# Patient Record
Sex: FEMALE | Race: WHITE | NOT HISPANIC OR LATINO | Employment: OTHER | URBAN - METROPOLITAN AREA
[De-identification: names, ages, dates, MRNs, and addresses within clinical notes are randomized per-mention and may not be internally consistent; named-entity substitution may affect disease eponyms.]

---

## 2017-03-08 ENCOUNTER — HOSPITAL ENCOUNTER (OUTPATIENT)
Dept: GENERAL RADIOLOGY | Facility: HOSPITAL | Age: 82
Discharge: HOME | End: 2017-03-08
Attending: FAMILY MEDICINE | Admitting: FAMILY MEDICINE

## 2017-07-27 ENCOUNTER — OFFICE (OUTPATIENT)
Dept: URBAN - METROPOLITAN AREA CLINIC 64 | Facility: CLINIC | Age: 82
End: 2017-07-27

## 2017-07-27 VITALS
HEART RATE: 48 BPM | SYSTOLIC BLOOD PRESSURE: 103 MMHG | WEIGHT: 98 LBS | HEIGHT: 63 IN | DIASTOLIC BLOOD PRESSURE: 63 MMHG

## 2017-07-27 DIAGNOSIS — L30.9 DERMATITIS, UNSPECIFIED: ICD-10-CM

## 2017-07-27 DIAGNOSIS — K43.5 PARASTOMAL HERNIA WITHOUT OBSTRUCTION OR GANGRENE: ICD-10-CM

## 2017-07-27 PROCEDURE — 99202 OFFICE O/P NEW SF 15 MIN: CPT | Performed by: NURSE PRACTITIONER

## 2017-10-13 ENCOUNTER — HOSPITAL ENCOUNTER (OUTPATIENT)
Dept: CARDIOLOGY | Facility: HOSPITAL | Age: 82
Discharge: HOME | End: 2017-10-13
Attending: INTERNAL MEDICINE | Admitting: INTERNAL MEDICINE

## 2019-01-01 ENCOUNTER — APPOINTMENT (OUTPATIENT)
Dept: GENERAL RADIOLOGY | Facility: HOSPITAL | Age: 84
End: 2019-01-01

## 2019-01-01 ENCOUNTER — CLINICAL SUPPORT NO REQUIREMENTS (OUTPATIENT)
Dept: CARDIOLOGY | Facility: CLINIC | Age: 84
End: 2019-01-01

## 2019-01-01 ENCOUNTER — HOSPITAL ENCOUNTER (OUTPATIENT)
Dept: CARDIOLOGY | Facility: HOSPITAL | Age: 84
Discharge: HOME | End: 2019-10-15

## 2019-01-01 ENCOUNTER — APPOINTMENT (OUTPATIENT)
Dept: CT IMAGING | Facility: HOSPITAL | Age: 84
End: 2019-01-01

## 2019-01-01 ENCOUNTER — DOCUMENTATION (OUTPATIENT)
Dept: NEPHROLOGY | Facility: HOSPITAL | Age: 84
End: 2019-01-01

## 2019-01-01 ENCOUNTER — APPOINTMENT (OUTPATIENT)
Dept: ULTRASOUND IMAGING | Facility: HOSPITAL | Age: 84
End: 2019-01-01

## 2019-01-01 ENCOUNTER — HOSPITAL ENCOUNTER (INPATIENT)
Facility: HOSPITAL | Age: 84
LOS: 6 days | End: 2019-10-20
Attending: EMERGENCY MEDICINE | Admitting: INTERNAL MEDICINE

## 2019-01-01 ENCOUNTER — OFFICE VISIT (OUTPATIENT)
Dept: CARDIOLOGY | Facility: CLINIC | Age: 84
End: 2019-01-01

## 2019-01-01 VITALS
OXYGEN SATURATION: 67 % | RESPIRATION RATE: 24 BRPM | WEIGHT: 133.16 LBS | BODY MASS INDEX: 23.59 KG/M2 | HEART RATE: 93 BPM | DIASTOLIC BLOOD PRESSURE: 63 MMHG | SYSTOLIC BLOOD PRESSURE: 81 MMHG | HEIGHT: 63 IN | TEMPERATURE: 97.4 F

## 2019-01-01 VITALS
HEIGHT: 61 IN | SYSTOLIC BLOOD PRESSURE: 104 MMHG | HEART RATE: 65 BPM | BODY MASS INDEX: 20.11 KG/M2 | WEIGHT: 106.5 LBS | DIASTOLIC BLOOD PRESSURE: 62 MMHG | OXYGEN SATURATION: 98 %

## 2019-01-01 VITALS
DIASTOLIC BLOOD PRESSURE: 37 MMHG | BODY MASS INDEX: 22.32 KG/M2 | WEIGHT: 126 LBS | SYSTOLIC BLOOD PRESSURE: 104 MMHG | HEIGHT: 63 IN

## 2019-01-01 DIAGNOSIS — Z95.0 PACEMAKER: ICD-10-CM

## 2019-01-01 DIAGNOSIS — R65.21 SEPTIC SHOCK (HCC): Primary | ICD-10-CM

## 2019-01-01 DIAGNOSIS — I21.4 NON-ST ELEVATION MYOCARDIAL INFARCTION (NSTEMI) (HCC): ICD-10-CM

## 2019-01-01 DIAGNOSIS — R00.1 BRADYCARDIA, UNSPECIFIED: Primary | ICD-10-CM

## 2019-01-01 DIAGNOSIS — I10 ESSENTIAL HYPERTENSION: ICD-10-CM

## 2019-01-01 DIAGNOSIS — A41.9 SEPTIC SHOCK (HCC): Primary | ICD-10-CM

## 2019-01-01 DIAGNOSIS — N17.0 ACUTE KIDNEY INJURY (AKI) WITH ACUTE TUBULAR NECROSIS (ATN) (HCC): ICD-10-CM

## 2019-01-01 DIAGNOSIS — I48.0 PAROXYSMAL ATRIAL FIBRILLATION (HCC): Primary | ICD-10-CM

## 2019-01-01 DIAGNOSIS — I48.0 PAROXYSMAL ATRIAL FIBRILLATION (HCC): ICD-10-CM

## 2019-01-01 DIAGNOSIS — I50.9 ACUTE CONGESTIVE HEART FAILURE, UNSPECIFIED HEART FAILURE TYPE (HCC): ICD-10-CM

## 2019-01-01 DIAGNOSIS — I82.502 CHRONIC DEEP VEIN THROMBOSIS (DVT) OF LEFT LOWER EXTREMITY, UNSPECIFIED VEIN (HCC): ICD-10-CM

## 2019-01-01 DIAGNOSIS — R00.1 BRADYCARDIA, SINUS: Primary | ICD-10-CM

## 2019-01-01 LAB
ABO GROUP BLD: NORMAL
ALBUMIN SERPL-MCNC: 3 G/DL (ref 3.5–5.2)
ALBUMIN SERPL-MCNC: 3.5 G/DL (ref 3.5–4.8)
ALBUMIN SERPL-MCNC: 3.5 G/DL (ref 3.5–4.8)
ALBUMIN/GLOB SERPL: 1.2 G/DL (ref 1–1.7)
ALBUMIN/GLOB SERPL: 1.3 G/DL (ref 1–1.7)
ALP SERPL-CCNC: 94 U/L (ref 39–117)
ALP SERPL-CCNC: 97 U/L (ref 32–91)
ALP SERPL-CCNC: 97 U/L (ref 32–91)
ALT SERPL W P-5'-P-CCNC: 27 U/L (ref 1–33)
ALT SERPL W P-5'-P-CCNC: <5 U/L (ref 14–54)
ALT SERPL W P-5'-P-CCNC: <5 U/L (ref 14–54)
ANION GAP SERPL CALCULATED.3IONS-SCNC: 12 MMOL/L (ref 5–15)
ANION GAP SERPL CALCULATED.3IONS-SCNC: 12 MMOL/L (ref 5–15)
ANION GAP SERPL CALCULATED.3IONS-SCNC: 13.6 MMOL/L (ref 5–15)
ANION GAP SERPL CALCULATED.3IONS-SCNC: 14.8 MMOL/L (ref 5–15)
ANION GAP SERPL CALCULATED.3IONS-SCNC: 17 MMOL/L (ref 5–15)
ANION GAP SERPL CALCULATED.3IONS-SCNC: 21 MMOL/L (ref 5–15)
ANION GAP SERPL CALCULATED.3IONS-SCNC: 23.2 MMOL/L (ref 5–15)
ANION GAP SERPL CALCULATED.3IONS-SCNC: 24.6 MMOL/L (ref 5–15)
ANION GAP SERPL CALCULATED.3IONS-SCNC: 28.3 MMOL/L (ref 5–15)
ANION GAP SERPL CALCULATED.3IONS-SCNC: 32.2 MMOL/L (ref 5–15)
ANION GAP SERPL CALCULATED.3IONS-SCNC: 33.2 MMOL/L (ref 5–15)
APTT PPP: 24.7 SECONDS (ref 24–31)
APTT PPP: 26.2 SECONDS (ref 24–31)
ARTERIAL PATENCY WRIST A: ABNORMAL
ARTERIAL PATENCY WRIST A: POSITIVE
AST SERPL-CCNC: 51 U/L (ref 15–41)
AST SERPL-CCNC: 51 U/L (ref 15–41)
AST SERPL-CCNC: 78 U/L (ref 1–32)
ATMOSPHERIC PRESS: ABNORMAL MM[HG]
B PERT DNA SPEC QL NAA+PROBE: NOT DETECTED
BACTERIA SPEC AEROBE CULT: ABNORMAL
BACTERIA SPEC AEROBE CULT: ABNORMAL
BACTERIA SPEC AEROBE CULT: NORMAL
BACTERIA UR QL AUTO: ABNORMAL /HPF
BASE EXCESS BLDA CALC-SCNC: -15.1 MMOL/L (ref 0–3)
BASE EXCESS BLDA CALC-SCNC: -19.3 MMOL/L (ref 0–3)
BASE EXCESS BLDA CALC-SCNC: -3.8 MMOL/L (ref 0–3)
BASE EXCESS BLDA CALC-SCNC: -5.1 MMOL/L (ref 0–3)
BASE EXCESS BLDA CALC-SCNC: -8.4 MMOL/L (ref 0–3)
BASE EXCESS BLDA CALC-SCNC: 6.7 MMOL/L (ref 0–3)
BASE EXCESS BLDA CALC-SCNC: 7.1 MMOL/L (ref 0–3)
BASOPHILS # BLD AUTO: 0 10*3/MM3 (ref 0–0.2)
BASOPHILS # BLD AUTO: 0.1 10*3/MM3 (ref 0–0.2)
BASOPHILS NFR BLD AUTO: 0.1 % (ref 0–1.5)
BASOPHILS NFR BLD AUTO: 0.2 % (ref 0–1.5)
BASOPHILS NFR BLD AUTO: 0.3 % (ref 0–1.5)
BASOPHILS NFR BLD AUTO: 0.3 % (ref 0–1.5)
BASOPHILS NFR BLD AUTO: 0.5 % (ref 0–1.5)
BDY SITE: ABNORMAL
BH CV ECHO MEAS - ACS: 1.6 CM
BH CV ECHO MEAS - AO ROOT AREA (BSA CORRECTED): 1.6
BH CV ECHO MEAS - AO ROOT AREA: 5.2 CM^2
BH CV ECHO MEAS - AO ROOT DIAM: 2.6 CM
BH CV ECHO MEAS - BSA(HAYCOCK): 1.6 M^2
BH CV ECHO MEAS - BSA: 1.6 M^2
BH CV ECHO MEAS - BZI_BMI: 22.5 KILOGRAMS/M^2
BH CV ECHO MEAS - BZI_METRIC_HEIGHT: 160 CM
BH CV ECHO MEAS - BZI_METRIC_WEIGHT: 57.6 KG
BH CV ECHO MEAS - EDV(CUBED): 46.2 ML
BH CV ECHO MEAS - EDV(MOD-SP4): 31.5 ML
BH CV ECHO MEAS - EDV(TEICH): 54 ML
BH CV ECHO MEAS - EF(CUBED): 69.3 %
BH CV ECHO MEAS - EF(MOD-BP): 54 %
BH CV ECHO MEAS - EF(MOD-SP4): 54 %
BH CV ECHO MEAS - EF(TEICH): 61.9 %
BH CV ECHO MEAS - ESV(CUBED): 14.2 ML
BH CV ECHO MEAS - ESV(MOD-SP4): 14.5 ML
BH CV ECHO MEAS - ESV(TEICH): 20.6 ML
BH CV ECHO MEAS - FS: 32.6 %
BH CV ECHO MEAS - IVS/LVPW: 0.94
BH CV ECHO MEAS - IVSD: 0.85 CM
BH CV ECHO MEAS - LA DIMENSION(2D): 4 CM
BH CV ECHO MEAS - LV DIASTOLIC VOL/BSA (35-75): 19.8 ML/M^2
BH CV ECHO MEAS - LV MASS(C)D: 88.9 GRAMS
BH CV ECHO MEAS - LV MASS(C)DI: 55.8 GRAMS/M^2
BH CV ECHO MEAS - LV MAX PG: 1.2 MMHG
BH CV ECHO MEAS - LV MEAN PG: 0.59 MMHG
BH CV ECHO MEAS - LV SYSTOLIC VOL/BSA (12-30): 9.1 ML/M^2
BH CV ECHO MEAS - LV V1 MAX: 54.8 CM/SEC
BH CV ECHO MEAS - LV V1 MEAN: 35.8 CM/SEC
BH CV ECHO MEAS - LV V1 VTI: 10.4 CM
BH CV ECHO MEAS - LVIDD: 3.6 CM
BH CV ECHO MEAS - LVIDS: 2.4 CM
BH CV ECHO MEAS - LVOT AREA: 2.4 CM^2
BH CV ECHO MEAS - LVOT DIAM: 1.8 CM
BH CV ECHO MEAS - LVPWD: 0.9 CM
BH CV ECHO MEAS - MR MAX PG: 26.1 MMHG
BH CV ECHO MEAS - MR MAX VEL: 255.2 CM/SEC
BH CV ECHO MEAS - MV DEC TIME: 0.2 SEC
BH CV ECHO MEAS - MV E MAX VEL: 81.7 CM/SEC
BH CV ECHO MEAS - MV MAX PG: 2.8 MMHG
BH CV ECHO MEAS - MV MEAN PG: 1.1 MMHG
BH CV ECHO MEAS - MV V2 MAX: 83.7 CM/SEC
BH CV ECHO MEAS - MV V2 MEAN: 44.4 CM/SEC
BH CV ECHO MEAS - MV V2 VTI: 13.8 CM
BH CV ECHO MEAS - MVA(VTI): 1.8 CM^2
BH CV ECHO MEAS - PA MAX PG (FULL): 0.43 MMHG
BH CV ECHO MEAS - PA MAX PG: 1.5 MMHG
BH CV ECHO MEAS - PA V2 MAX: 61 CM/SEC
BH CV ECHO MEAS - PVA(V,A): 3.7 CM^2
BH CV ECHO MEAS - PVA(V,D): 3.7 CM^2
BH CV ECHO MEAS - QP/QS: 1.3
BH CV ECHO MEAS - RAP SYSTOLE: 3 MMHG
BH CV ECHO MEAS - RV MAX PG: 1.1 MMHG
BH CV ECHO MEAS - RV MEAN PG: 0.44 MMHG
BH CV ECHO MEAS - RV V1 MAX: 51.4 CM/SEC
BH CV ECHO MEAS - RV V1 MEAN: 30.7 CM/SEC
BH CV ECHO MEAS - RV V1 VTI: 7.8 CM
BH CV ECHO MEAS - RVDD: 2.5 CM
BH CV ECHO MEAS - RVOT AREA: 4.4 CM^2
BH CV ECHO MEAS - RVOT DIAM: 2.4 CM
BH CV ECHO MEAS - RVSP: 37 MMHG
BH CV ECHO MEAS - SI(CUBED): 20.1 ML/M^2
BH CV ECHO MEAS - SI(LVOT): 15.9 ML/M^2
BH CV ECHO MEAS - SI(MOD-SP4): 10.7 ML/M^2
BH CV ECHO MEAS - SI(TEICH): 21 ML/M^2
BH CV ECHO MEAS - SV(CUBED): 32 ML
BH CV ECHO MEAS - SV(LVOT): 25.4 ML
BH CV ECHO MEAS - SV(MOD-SP4): 17 ML
BH CV ECHO MEAS - SV(RVOT): 34.2 ML
BH CV ECHO MEAS - SV(TEICH): 33.4 ML
BH CV ECHO MEAS - TR MAX VEL: 291.7 CM/SEC
BILIRUB CONJ SERPL-MCNC: 0.9 MG/DL (ref 0.2–0.3)
BILIRUB INDIRECT SERPL-MCNC: 0.5 MG/DL
BILIRUB SERPL-MCNC: 1.4 MG/DL (ref 0.2–1.2)
BILIRUB SERPL-MCNC: 2.1 MG/DL (ref 0.3–1.2)
BILIRUB SERPL-MCNC: 2.3 MG/DL (ref 0.3–1.2)
BILIRUB UR QL STRIP: ABNORMAL
BLD GP AB SCN SERPL QL: NEGATIVE
BNP SERPL-MCNC: 1784 PG/ML
BUN BLD-MCNC: 39 MG/DL (ref 8–23)
BUN BLD-MCNC: 40 MG/DL (ref 8–23)
BUN BLD-MCNC: 44 MG/DL (ref 8–23)
BUN BLD-MCNC: 48 MG/DL (ref 8–23)
BUN BLD-MCNC: 58 MG/DL (ref 8–23)
BUN BLD-MCNC: 63 MG/DL (ref 8–23)
BUN BLD-MCNC: 67 MG/DL (ref 8–20)
BUN BLD-MCNC: 68 MG/DL (ref 8–23)
BUN BLD-MCNC: 69 MG/DL (ref 8–20)
BUN BLD-MCNC: 71 MG/DL (ref 8–20)
BUN BLD-MCNC: 71 MG/DL (ref 8–20)
BUN/CREAT SERPL: 17.8 (ref 5.4–26.2)
BUN/CREAT SERPL: 18.1 (ref 5.4–26.2)
BUN/CREAT SERPL: 18.7 (ref 5.4–26.2)
BUN/CREAT SERPL: 19.7 (ref 5.4–26.2)
BUN/CREAT SERPL: 21.4 (ref 7–25)
BUN/CREAT SERPL: 22.9 (ref 7–25)
BUN/CREAT SERPL: 23.4 (ref 7–25)
BUN/CREAT SERPL: 23.5 (ref 7–25)
BUN/CREAT SERPL: 23.8 (ref 7–25)
BUN/CREAT SERPL: 25.1 (ref 7–25)
BUN/CREAT SERPL: 26.2 (ref 7–25)
BURR CELLS BLD QL SMEAR: ABNORMAL
C PNEUM DNA NPH QL NAA+NON-PROBE: NOT DETECTED
CA-I SERPL ISE-MCNC: 0.96 MMOL/L (ref 1.2–1.3)
CA-I SERPL ISE-MCNC: 1.02 MMOL/L (ref 1.2–1.3)
CALCIUM SPEC-SCNC: 7.2 MG/DL (ref 8.2–9.6)
CALCIUM SPEC-SCNC: 7.3 MG/DL (ref 8.2–9.6)
CALCIUM SPEC-SCNC: 7.3 MG/DL (ref 8.9–10.3)
CALCIUM SPEC-SCNC: 7.5 MG/DL (ref 8.2–9.6)
CALCIUM SPEC-SCNC: 7.5 MG/DL (ref 8.2–9.6)
CALCIUM SPEC-SCNC: 7.5 MG/DL (ref 8.9–10.3)
CALCIUM SPEC-SCNC: 7.7 MG/DL (ref 8.2–9.6)
CALCIUM SPEC-SCNC: 7.8 MG/DL (ref 8.2–9.6)
CALCIUM SPEC-SCNC: 7.9 MG/DL (ref 8.2–9.6)
CALCIUM SPEC-SCNC: 8 MG/DL (ref 8.9–10.3)
CALCIUM SPEC-SCNC: 8.2 MG/DL (ref 8.9–10.3)
CHLORIDE SERPL-SCNC: 100 MMOL/L (ref 98–107)
CHLORIDE SERPL-SCNC: 102 MMOL/L (ref 101–111)
CHLORIDE SERPL-SCNC: 103 MMOL/L (ref 101–111)
CHLORIDE SERPL-SCNC: 104 MMOL/L (ref 98–107)
CHLORIDE SERPL-SCNC: 105 MMOL/L (ref 98–107)
CHLORIDE SERPL-SCNC: 107 MMOL/L (ref 101–111)
CHLORIDE SERPL-SCNC: 109 MMOL/L (ref 101–111)
CHLORIDE SERPL-SCNC: 99 MMOL/L (ref 98–107)
CLARITY UR: ABNORMAL
CO2 BLDA-SCNC: 13 MMOL/L (ref 22–29)
CO2 BLDA-SCNC: 18.8 MMOL/L (ref 22–29)
CO2 BLDA-SCNC: 20.1 MMOL/L (ref 22–29)
CO2 BLDA-SCNC: 27.6 MMOL/L (ref 22–29)
CO2 BLDA-SCNC: 33.8 MMOL/L (ref 22–29)
CO2 BLDA-SCNC: 35.9 MMOL/L (ref 22–29)
CO2 BLDA-SCNC: 8.4 MMOL/L (ref 22–29)
CO2 SERPL-SCNC: 12 MMOL/L (ref 22–32)
CO2 SERPL-SCNC: 14 MMOL/L (ref 22–32)
CO2 SERPL-SCNC: 15 MMOL/L (ref 22–32)
CO2 SERPL-SCNC: 20 MMOL/L (ref 22–32)
CO2 SERPL-SCNC: 21 MMOL/L (ref 22–29)
CO2 SERPL-SCNC: 23 MMOL/L (ref 22–29)
CO2 SERPL-SCNC: 23 MMOL/L (ref 22–29)
CO2 SERPL-SCNC: 28 MMOL/L (ref 22–29)
CO2 SERPL-SCNC: 31 MMOL/L (ref 22–29)
CO2 SERPL-SCNC: 32 MMOL/L (ref 22–29)
CO2 SERPL-SCNC: 33 MMOL/L (ref 22–29)
COLOR UR: ABNORMAL
CREAT BLD-MCNC: 1.68 MG/DL (ref 0.57–1)
CREAT BLD-MCNC: 1.7 MG/DL (ref 0.57–1)
CREAT BLD-MCNC: 1.7 MG/DL (ref 0.57–1)
CREAT BLD-MCNC: 2.05 MG/DL (ref 0.57–1)
CREAT BLD-MCNC: 2.31 MG/DL (ref 0.57–1)
CREAT BLD-MCNC: 2.65 MG/DL (ref 0.57–1)
CREAT BLD-MCNC: 3.18 MG/DL (ref 0.57–1)
CREAT BLD-MCNC: 3.5 MG/DL (ref 0.4–1)
CREAT BLD-MCNC: 3.7 MG/DL (ref 0.4–1)
CREAT BLD-MCNC: 3.8 MG/DL (ref 0.4–1)
CREAT BLD-MCNC: 4 MG/DL (ref 0.4–1)
CRP SERPL-MCNC: 11.81 MG/DL (ref 0–0.7)
CRP SERPL-MCNC: 4.27 MG/DL (ref 0–0.5)
D-LACTATE SERPL-SCNC: 1.5 MMOL/L (ref 0.5–2)
D-LACTATE SERPL-SCNC: 1.6 MMOL/L (ref 0.5–2)
D-LACTATE SERPL-SCNC: 1.7 MMOL/L (ref 0.5–2)
D-LACTATE SERPL-SCNC: 10 MMOL/L (ref 0.5–2)
D-LACTATE SERPL-SCNC: 10.8 MMOL/L (ref 0.5–2)
D-LACTATE SERPL-SCNC: 13.2 MMOL/L (ref 0.5–2.2)
D-LACTATE SERPL-SCNC: 2.1 MMOL/L (ref 0.5–2)
D-LACTATE SERPL-SCNC: 3.5 MMOL/L (ref 0.5–2)
D-LACTATE SERPL-SCNC: 4.2 MMOL/L (ref 0.5–2)
D-LACTATE SERPL-SCNC: 5.2 MMOL/L (ref 0.5–2.2)
D-LACTATE SERPL-SCNC: 5.9 MMOL/L (ref 0.5–2)
D-LACTATE SERPL-SCNC: 7 MMOL/L (ref 0.5–2)
D-LACTATE SERPL-SCNC: 8.3 MMOL/L (ref 0.5–2)
D-LACTATE SERPL-SCNC: 8.6 MMOL/L (ref 0.5–2.2)
D-LACTATE SERPL-SCNC: 9.1 MMOL/L (ref 0.5–2)
D-LACTATE SERPL-SCNC: 9.2 MMOL/L (ref 0.5–2)
D-LACTATE SERPL-SCNC: 9.6 MMOL/L (ref 0.5–2)
D-LACTATE SERPL-SCNC: 9.7 MMOL/L (ref 0.5–2)
DEPRECATED RDW RBC AUTO: 58.2 FL (ref 37–54)
DEPRECATED RDW RBC AUTO: 60.4 FL (ref 37–54)
DEPRECATED RDW RBC AUTO: 60.8 FL (ref 37–54)
DEPRECATED RDW RBC AUTO: 62.1 FL (ref 37–54)
DEPRECATED RDW RBC AUTO: 63.9 FL (ref 37–54)
DEPRECATED RDW RBC AUTO: 64.3 FL (ref 37–54)
DEPRECATED RDW RBC AUTO: 67.4 FL (ref 37–54)
DEPRECATED RDW RBC AUTO: 71.8 FL (ref 37–54)
EOSINOPHIL # BLD AUTO: 0 10*3/MM3 (ref 0–0.4)
EOSINOPHIL # BLD AUTO: 0.1 10*3/MM3 (ref 0–0.4)
EOSINOPHIL NFR BLD AUTO: 0 % (ref 0.3–6.2)
EOSINOPHIL NFR BLD AUTO: 0 % (ref 0.3–6.2)
EOSINOPHIL NFR BLD AUTO: 0.1 % (ref 0.3–6.2)
EOSINOPHIL NFR BLD AUTO: 0.1 % (ref 0.3–6.2)
EOSINOPHIL NFR BLD AUTO: 0.5 % (ref 0.3–6.2)
ERYTHROCYTE [DISTWIDTH] IN BLOOD BY AUTOMATED COUNT: 17.1 % (ref 12.3–15.4)
ERYTHROCYTE [DISTWIDTH] IN BLOOD BY AUTOMATED COUNT: 17.5 % (ref 12.3–15.4)
ERYTHROCYTE [DISTWIDTH] IN BLOOD BY AUTOMATED COUNT: 18 % (ref 12.3–15.4)
ERYTHROCYTE [DISTWIDTH] IN BLOOD BY AUTOMATED COUNT: 18.1 % (ref 12.3–15.4)
ERYTHROCYTE [DISTWIDTH] IN BLOOD BY AUTOMATED COUNT: 18.2 % (ref 12.3–15.4)
ERYTHROCYTE [DISTWIDTH] IN BLOOD BY AUTOMATED COUNT: 18.5 % (ref 12.3–15.4)
ERYTHROCYTE [DISTWIDTH] IN BLOOD BY AUTOMATED COUNT: 19.2 % (ref 12.3–15.4)
ERYTHROCYTE [DISTWIDTH] IN BLOOD BY AUTOMATED COUNT: 19.3 % (ref 12.3–15.4)
FLUAV H1 2009 PAND RNA NPH QL NAA+PROBE: NOT DETECTED
FLUAV H1 HA GENE NPH QL NAA+PROBE: NOT DETECTED
FLUAV H3 RNA NPH QL NAA+PROBE: NOT DETECTED
FLUAV SUBTYP SPEC NAA+PROBE: NOT DETECTED
FLUBV RNA ISLT QL NAA+PROBE: NOT DETECTED
GFR SERPL CREATININE-BSD FRML MDRD: 10 ML/MIN/1.73
GFR SERPL CREATININE-BSD FRML MDRD: 11 ML/MIN/1.73
GFR SERPL CREATININE-BSD FRML MDRD: 11 ML/MIN/1.73
GFR SERPL CREATININE-BSD FRML MDRD: 12 ML/MIN/1.73
GFR SERPL CREATININE-BSD FRML MDRD: 14 ML/MIN/1.73
GFR SERPL CREATININE-BSD FRML MDRD: 17 ML/MIN/1.73
GFR SERPL CREATININE-BSD FRML MDRD: 20 ML/MIN/1.73
GFR SERPL CREATININE-BSD FRML MDRD: 23 ML/MIN/1.73
GFR SERPL CREATININE-BSD FRML MDRD: 28 ML/MIN/1.73
GFR SERPL CREATININE-BSD FRML MDRD: ABNORMAL ML/MIN/{1.73_M2}
GLOBULIN UR ELPH-MCNC: 2.7 GM/DL (ref 2.5–3.8)
GLOBULIN UR ELPH-MCNC: 2.9 GM/DL (ref 2.5–3.8)
GLUCOSE BLD-MCNC: 101 MG/DL (ref 65–99)
GLUCOSE BLD-MCNC: 124 MG/DL (ref 65–99)
GLUCOSE BLD-MCNC: 142 MG/DL (ref 65–99)
GLUCOSE BLD-MCNC: 146 MG/DL (ref 65–99)
GLUCOSE BLD-MCNC: 152 MG/DL (ref 65–99)
GLUCOSE BLD-MCNC: 161 MG/DL (ref 65–99)
GLUCOSE BLD-MCNC: 162 MG/DL (ref 65–99)
GLUCOSE BLD-MCNC: 169 MG/DL (ref 65–99)
GLUCOSE BLD-MCNC: 170 MG/DL (ref 65–99)
GLUCOSE BLD-MCNC: 213 MG/DL (ref 65–99)
GLUCOSE BLD-MCNC: 90 MG/DL (ref 65–99)
GLUCOSE BLDC GLUCOMTR-MCNC: 102 MG/DL (ref 70–105)
GLUCOSE BLDC GLUCOMTR-MCNC: 116 MG/DL (ref 70–105)
GLUCOSE BLDC GLUCOMTR-MCNC: 118 MG/DL (ref 70–105)
GLUCOSE BLDC GLUCOMTR-MCNC: 120 MG/DL (ref 70–105)
GLUCOSE BLDC GLUCOMTR-MCNC: 122 MG/DL (ref 70–105)
GLUCOSE BLDC GLUCOMTR-MCNC: 125 MG/DL (ref 70–105)
GLUCOSE BLDC GLUCOMTR-MCNC: 125 MG/DL (ref 70–105)
GLUCOSE BLDC GLUCOMTR-MCNC: 128 MG/DL (ref 70–105)
GLUCOSE BLDC GLUCOMTR-MCNC: 130 MG/DL (ref 70–105)
GLUCOSE BLDC GLUCOMTR-MCNC: 135 MG/DL (ref 70–105)
GLUCOSE BLDC GLUCOMTR-MCNC: 137 MG/DL (ref 70–105)
GLUCOSE BLDC GLUCOMTR-MCNC: 149 MG/DL (ref 70–105)
GLUCOSE BLDC GLUCOMTR-MCNC: 150 MG/DL (ref 70–105)
GLUCOSE BLDC GLUCOMTR-MCNC: 161 MG/DL (ref 70–105)
GLUCOSE BLDC GLUCOMTR-MCNC: 163 MG/DL (ref 70–105)
GLUCOSE BLDC GLUCOMTR-MCNC: 165 MG/DL (ref 70–105)
GLUCOSE BLDC GLUCOMTR-MCNC: 174 MG/DL (ref 70–105)
GLUCOSE BLDC GLUCOMTR-MCNC: 178 MG/DL (ref 70–105)
GLUCOSE BLDC GLUCOMTR-MCNC: 180 MG/DL (ref 70–105)
GLUCOSE BLDC GLUCOMTR-MCNC: 239 MG/DL (ref 70–105)
GLUCOSE BLDC GLUCOMTR-MCNC: 30 MG/DL (ref 70–105)
GLUCOSE BLDC GLUCOMTR-MCNC: 56 MG/DL (ref 70–105)
GLUCOSE UR STRIP-MCNC: NEGATIVE MG/DL
GRAM STN SPEC: ABNORMAL
GRAN CASTS URNS QL MICRO: ABNORMAL /LPF
HADV DNA SPEC NAA+PROBE: NOT DETECTED
HCO3 BLDA-SCNC: 12 MMOL/L (ref 21–28)
HCO3 BLDA-SCNC: 17.6 MMOL/L (ref 21–28)
HCO3 BLDA-SCNC: 19.1 MMOL/L (ref 21–28)
HCO3 BLDA-SCNC: 25.6 MMOL/L (ref 21–28)
HCO3 BLDA-SCNC: 32.3 MMOL/L (ref 21–28)
HCO3 BLDA-SCNC: 34 MMOL/L (ref 21–28)
HCO3 BLDA-SCNC: 7.7 MMOL/L (ref 21–28)
HCOV 229E RNA SPEC QL NAA+PROBE: NOT DETECTED
HCOV HKU1 RNA SPEC QL NAA+PROBE: NOT DETECTED
HCOV NL63 RNA SPEC QL NAA+PROBE: NOT DETECTED
HCOV OC43 RNA SPEC QL NAA+PROBE: NOT DETECTED
HCT VFR BLD AUTO: 34.2 % (ref 34–46.6)
HCT VFR BLD AUTO: 34.7 % (ref 34–46.6)
HCT VFR BLD AUTO: 35.1 % (ref 34–46.6)
HCT VFR BLD AUTO: 36.4 % (ref 34–46.6)
HCT VFR BLD AUTO: 36.4 % (ref 34–46.6)
HCT VFR BLD AUTO: 37.7 % (ref 34–46.6)
HCT VFR BLD AUTO: 38.2 % (ref 34–46.6)
HCT VFR BLD AUTO: 44.9 % (ref 34–46.6)
HEMODILUTION: NO
HEMODILUTION: YES
HEMODILUTION: YES
HGB BLD-MCNC: 11 G/DL (ref 12–15.9)
HGB BLD-MCNC: 11.2 G/DL (ref 12–15.9)
HGB BLD-MCNC: 11.4 G/DL (ref 12–15.9)
HGB BLD-MCNC: 11.7 G/DL (ref 12–15.9)
HGB BLD-MCNC: 12.1 G/DL (ref 12–15.9)
HGB BLD-MCNC: 13.1 G/DL (ref 12–15.9)
HGB UR QL STRIP.AUTO: ABNORMAL
HMPV RNA NPH QL NAA+NON-PROBE: NOT DETECTED
HOLD SPECIMEN: NORMAL
HOROWITZ INDEX BLD+IHG-RTO: 100 %
HOROWITZ INDEX BLD+IHG-RTO: 100 %
HOROWITZ INDEX BLD+IHG-RTO: 44 %
HOROWITZ INDEX BLD+IHG-RTO: 50 %
HOROWITZ INDEX BLD+IHG-RTO: 60 %
HOROWITZ INDEX BLD+IHG-RTO: 60 %
HOROWITZ INDEX BLD+IHG-RTO: <21 %
HPIV1 RNA SPEC QL NAA+PROBE: NOT DETECTED
HPIV2 RNA SPEC QL NAA+PROBE: NOT DETECTED
HPIV3 RNA NPH QL NAA+PROBE: NOT DETECTED
HPIV4 P GENE NPH QL NAA+PROBE: NOT DETECTED
HYALINE CASTS UR QL AUTO: ABNORMAL /LPF
INR PPP: 1.6 (ref 0.9–1.1)
INR PPP: 1.84 (ref 0.9–1.1)
INR PPP: 2.1 (ref 0.9–1.1)
INR PPP: 2.44 (ref 0.9–1.1)
INR PPP: 3.14 (ref 0.9–1.1)
ISOLATED FROM: ABNORMAL
KETONES UR QL STRIP: ABNORMAL
L PNEUMO1 AG UR QL IA: NEGATIVE
LEUKOCYTE ESTERASE UR QL STRIP.AUTO: ABNORMAL
LV EF 2D ECHO EST: 60 %
LYMPHOCYTES # BLD AUTO: 0.3 10*3/MM3 (ref 0.7–3.1)
LYMPHOCYTES # BLD AUTO: 0.4 10*3/MM3 (ref 0.7–3.1)
LYMPHOCYTES # BLD AUTO: 0.5 10*3/MM3 (ref 0.7–3.1)
LYMPHOCYTES # BLD AUTO: 0.7 10*3/MM3 (ref 0.7–3.1)
LYMPHOCYTES # BLD AUTO: 0.8 10*3/MM3 (ref 0.7–3.1)
LYMPHOCYTES # BLD MANUAL: 0.41 10*3/MM3 (ref 0.7–3.1)
LYMPHOCYTES # BLD MANUAL: 1 10*3/MM3 (ref 0.7–3.1)
LYMPHOCYTES NFR BLD AUTO: 2.1 % (ref 19.6–45.3)
LYMPHOCYTES NFR BLD AUTO: 2.3 % (ref 19.6–45.3)
LYMPHOCYTES NFR BLD AUTO: 3.7 % (ref 19.6–45.3)
LYMPHOCYTES NFR BLD AUTO: 3.8 % (ref 19.6–45.3)
LYMPHOCYTES NFR BLD AUTO: 6.4 % (ref 19.6–45.3)
LYMPHOCYTES NFR BLD MANUAL: 2 % (ref 19.6–45.3)
LYMPHOCYTES NFR BLD MANUAL: 4 % (ref 19.6–45.3)
LYMPHOCYTES NFR BLD MANUAL: 6 % (ref 5–12)
LYMPHOCYTES NFR BLD MANUAL: 7 % (ref 5–12)
M PNEUMO IGG SER IA-ACNC: NOT DETECTED
MAGNESIUM SERPL-MCNC: 1.7 MG/DL (ref 1.7–2.3)
MAGNESIUM SERPL-MCNC: 1.9 MG/DL (ref 1.7–2.3)
MAGNESIUM SERPL-MCNC: 2 MG/DL (ref 1.7–2.3)
MAGNESIUM SERPL-MCNC: 2.1 MG/DL (ref 1.7–2.3)
MAGNESIUM SERPL-MCNC: 2.4 MG/DL (ref 1.7–2.3)
MAGNESIUM SERPL-MCNC: 2.8 MG/DL (ref 1.8–2.5)
MAGNESIUM SERPL-MCNC: 3.4 MG/DL (ref 1.8–2.5)
MCH RBC QN AUTO: 30 PG (ref 26.6–33)
MCH RBC QN AUTO: 30.7 PG (ref 26.6–33)
MCH RBC QN AUTO: 30.7 PG (ref 26.6–33)
MCH RBC QN AUTO: 30.9 PG (ref 26.6–33)
MCH RBC QN AUTO: 30.9 PG (ref 26.6–33)
MCH RBC QN AUTO: 31 PG (ref 26.6–33)
MCH RBC QN AUTO: 31.4 PG (ref 26.6–33)
MCH RBC QN AUTO: 31.5 PG (ref 26.6–33)
MCHC RBC AUTO-ENTMCNC: 29.3 G/DL (ref 31.5–35.7)
MCHC RBC AUTO-ENTMCNC: 30.6 G/DL (ref 31.5–35.7)
MCHC RBC AUTO-ENTMCNC: 31.1 G/DL (ref 31.5–35.7)
MCHC RBC AUTO-ENTMCNC: 31.2 G/DL (ref 31.5–35.7)
MCHC RBC AUTO-ENTMCNC: 31.4 G/DL (ref 31.5–35.7)
MCHC RBC AUTO-ENTMCNC: 31.7 G/DL (ref 31.5–35.7)
MCHC RBC AUTO-ENTMCNC: 31.8 G/DL (ref 31.5–35.7)
MCHC RBC AUTO-ENTMCNC: 32.3 G/DL (ref 31.5–35.7)
MCV RBC AUTO: 101 FL (ref 79–97)
MCV RBC AUTO: 104.8 FL (ref 79–97)
MCV RBC AUTO: 95.2 FL (ref 79–97)
MCV RBC AUTO: 97 FL (ref 79–97)
MCV RBC AUTO: 97.9 FL (ref 79–97)
MCV RBC AUTO: 98.6 FL (ref 79–97)
MCV RBC AUTO: 98.9 FL (ref 79–97)
MCV RBC AUTO: 99.5 FL (ref 79–97)
MODALITY: ABNORMAL
MONOCYTES # BLD AUTO: 0.9 10*3/MM3 (ref 0.1–0.9)
MONOCYTES # BLD AUTO: 1 10*3/MM3 (ref 0.1–0.9)
MONOCYTES # BLD AUTO: 1 10*3/MM3 (ref 0.1–0.9)
MONOCYTES # BLD AUTO: 1.44 10*3/MM3 (ref 0.1–0.9)
MONOCYTES # BLD AUTO: 1.49 10*3/MM3 (ref 0.1–0.9)
MONOCYTES # BLD AUTO: 1.5 10*3/MM3 (ref 0.1–0.9)
MONOCYTES # BLD AUTO: 1.7 10*3/MM3 (ref 0.1–0.9)
MONOCYTES NFR BLD AUTO: 10.2 % (ref 5–12)
MONOCYTES NFR BLD AUTO: 6.3 % (ref 5–12)
MONOCYTES NFR BLD AUTO: 7.1 % (ref 5–12)
MONOCYTES NFR BLD AUTO: 7.6 % (ref 5–12)
MONOCYTES NFR BLD AUTO: 8.1 % (ref 5–12)
MRSA SPEC QL CULT: NORMAL
NEUTROPHILS # BLD AUTO: 11.2 10*3/MM3 (ref 1.7–7)
NEUTROPHILS # BLD AUTO: 11.9 10*3/MM3 (ref 1.7–7)
NEUTROPHILS # BLD AUTO: 12.7 10*3/MM3 (ref 1.7–7)
NEUTROPHILS # BLD AUTO: 14.7 10*3/MM3 (ref 1.7–7)
NEUTROPHILS # BLD AUTO: 16.5 10*3/MM3 (ref 1.7–7)
NEUTROPHILS # BLD AUTO: 18.66 10*3/MM3 (ref 1.7–7)
NEUTROPHILS # BLD AUTO: 22.41 10*3/MM3 (ref 1.7–7)
NEUTROPHILS NFR BLD AUTO: 85.2 % (ref 42.7–76)
NEUTROPHILS NFR BLD AUTO: 87 % (ref 42.7–76)
NEUTROPHILS NFR BLD AUTO: 88 % (ref 42.7–76)
NEUTROPHILS NFR BLD AUTO: 88.7 % (ref 42.7–76)
NEUTROPHILS NFR BLD AUTO: 91.4 % (ref 42.7–76)
NEUTROPHILS NFR BLD MANUAL: 84 % (ref 42.7–76)
NEUTROPHILS NFR BLD MANUAL: 86 % (ref 42.7–76)
NEUTS BAND NFR BLD MANUAL: 5 % (ref 0–5)
NEUTS BAND NFR BLD MANUAL: 6 % (ref 0–5)
NEUTS VAC BLD QL SMEAR: ABNORMAL
NEUTS VAC BLD QL SMEAR: ABNORMAL
NITRITE UR QL STRIP: NEGATIVE
NRBC BLD AUTO-RTO: 0.3 /100 WBC (ref 0–0.2)
NRBC BLD AUTO-RTO: 0.3 /100 WBC (ref 0–0.2)
NRBC BLD AUTO-RTO: 0.5 /100 WBC (ref 0–0.2)
NRBC BLD AUTO-RTO: 0.6 /100 WBC (ref 0–0.2)
NRBC BLD AUTO-RTO: 0.8 /100 WBC (ref 0–0.2)
NRBC SPEC MANUAL: 2 /100 WBC (ref 0–0.2)
NRBC SPEC MANUAL: 2 /100 WBC (ref 0–0.2)
NT-PROBNP SERPL-MCNC: ABNORMAL PG/ML (ref 5–1800)
PCO2 BLDA: 22.2 MM HG (ref 35–48)
PCO2 BLDA: 31.9 MM HG (ref 35–48)
PCO2 BLDA: 32 MM HG (ref 35–48)
PCO2 BLDA: 37.1 MM HG (ref 35–48)
PCO2 BLDA: 47 MM HG (ref 35–48)
PCO2 BLDA: 59.8 MM HG (ref 35–48)
PCO2 BLDA: 66.1 MM HG (ref 35–48)
PEEP RESPIRATORY: 5 CM[H2O]
PEEP RESPIRATORY: 5 CM[H2O]
PH BLDA: 7.15 PH UNITS (ref 7.35–7.45)
PH BLDA: 7.18 PH UNITS (ref 7.35–7.45)
PH BLDA: 7.2 PH UNITS (ref 7.35–7.45)
PH BLDA: 7.28 PH UNITS (ref 7.35–7.45)
PH BLDA: 7.36 PH UNITS (ref 7.35–7.45)
PH BLDA: 7.38 PH UNITS (ref 7.35–7.45)
PH BLDA: 7.45 PH UNITS (ref 7.35–7.45)
PH UR STRIP.AUTO: 5.5 [PH] (ref 5–8)
PHOSPHATE SERPL-MCNC: 2.8 MG/DL (ref 2.5–4.5)
PHOSPHATE SERPL-MCNC: 3.1 MG/DL (ref 2.5–4.5)
PHOSPHATE SERPL-MCNC: 4.2 MG/DL (ref 2.5–4.5)
PHOSPHATE SERPL-MCNC: 4.3 MG/DL (ref 2.4–4.7)
PHOSPHATE SERPL-MCNC: 5.4 MG/DL (ref 2.5–4.5)
PHOSPHATE SERPL-MCNC: 6.5 MG/DL (ref 2.4–4.7)
PHOSPHATE SERPL-MCNC: 6.8 MG/DL (ref 2.5–4.5)
PLAT MORPH BLD: NORMAL
PLAT MORPH BLD: NORMAL
PLATELET # BLD AUTO: 116 10*3/MM3 (ref 140–450)
PLATELET # BLD AUTO: 120 10*3/MM3 (ref 140–450)
PLATELET # BLD AUTO: 139 10*3/MM3 (ref 140–450)
PLATELET # BLD AUTO: 147 10*3/MM3 (ref 140–450)
PLATELET # BLD AUTO: 160 10*3/MM3 (ref 140–450)
PLATELET # BLD AUTO: 185 10*3/MM3 (ref 140–450)
PLATELET # BLD AUTO: 187 10*3/MM3 (ref 140–450)
PLATELET # BLD AUTO: 210 10*3/MM3 (ref 140–450)
PMV BLD AUTO: 7.8 FL (ref 6–12)
PMV BLD AUTO: 8.1 FL (ref 6–12)
PMV BLD AUTO: 8.1 FL (ref 6–12)
PMV BLD AUTO: 8.4 FL (ref 6–12)
PMV BLD AUTO: 8.6 FL (ref 6–12)
PMV BLD AUTO: 8.7 FL (ref 6–12)
PMV BLD AUTO: 9.1 FL (ref 6–12)
PMV BLD AUTO: 9.4 FL (ref 6–12)
PO2 BLDA: 101.7 MM HG (ref 83–108)
PO2 BLDA: 19.5 MM HG (ref 83–108)
PO2 BLDA: 342.6 MM HG (ref 83–108)
PO2 BLDA: 79.5 MM HG (ref 83–108)
PO2 BLDA: 91.2 MM HG (ref 83–108)
PO2 BLDA: 93.3 MM HG (ref 83–108)
PO2 BLDA: 99.5 MM HG (ref 83–108)
POLYCHROMASIA BLD QL SMEAR: ABNORMAL
POLYCHROMASIA BLD QL SMEAR: ABNORMAL
POTASSIUM BLD-SCNC: 3.6 MMOL/L (ref 3.5–5.2)
POTASSIUM BLD-SCNC: 3.8 MMOL/L (ref 3.5–5.2)
POTASSIUM BLD-SCNC: 4.1 MMOL/L (ref 3.5–5.2)
POTASSIUM BLD-SCNC: 4.1 MMOL/L (ref 3.5–5.2)
POTASSIUM BLD-SCNC: 4.2 MMOL/L (ref 3.5–5.2)
POTASSIUM BLD-SCNC: 4.2 MMOL/L (ref 3.5–5.2)
POTASSIUM BLD-SCNC: 4.3 MMOL/L (ref 3.6–5.1)
POTASSIUM BLD-SCNC: 4.6 MMOL/L (ref 3.6–5.1)
POTASSIUM BLD-SCNC: 5.1 MMOL/L (ref 3.5–5.2)
POTASSIUM BLD-SCNC: 5.2 MMOL/L (ref 3.6–5.1)
POTASSIUM BLD-SCNC: 5.2 MMOL/L (ref 3.6–5.1)
PROCALCITONIN SERPL-MCNC: 0.77 NG/ML (ref 0.1–0.25)
PROCALCITONIN SERPL-MCNC: 1.28 NG/ML (ref 0.1–0.25)
PROCALCITONIN SERPL-MCNC: 1.3 NG/ML (ref 0.1–0.25)
PROT SERPL-MCNC: 5.3 G/DL (ref 6–8.5)
PROT SERPL-MCNC: 6.2 G/DL (ref 6.1–7.9)
PROT SERPL-MCNC: 6.4 G/DL (ref 6.1–7.9)
PROT UR QL STRIP: ABNORMAL
PROTHROMBIN TIME: 15.6 SECONDS (ref 9.6–11.7)
PROTHROMBIN TIME: 17.8 SECONDS (ref 9.6–11.7)
PROTHROMBIN TIME: 20.1 SECONDS (ref 9.6–11.7)
PROTHROMBIN TIME: 23.1 SECONDS (ref 9.6–11.7)
PROTHROMBIN TIME: 29.6 SECONDS (ref 9.6–11.7)
PSV: 20 CMH2O
RBC # BLD AUTO: 3.55 10*6/MM3 (ref 3.77–5.28)
RBC # BLD AUTO: 3.57 10*6/MM3 (ref 3.77–5.28)
RBC # BLD AUTO: 3.59 10*6/MM3 (ref 3.77–5.28)
RBC # BLD AUTO: 3.69 10*6/MM3 (ref 3.77–5.28)
RBC # BLD AUTO: 3.72 10*6/MM3 (ref 3.77–5.28)
RBC # BLD AUTO: 3.74 10*6/MM3 (ref 3.77–5.28)
RBC # BLD AUTO: 3.84 10*6/MM3 (ref 3.77–5.28)
RBC # BLD AUTO: 4.29 10*6/MM3 (ref 3.77–5.28)
RBC # UR: ABNORMAL /HPF
REF LAB TEST METHOD: ABNORMAL
RESPIRATORY RATE: 18
RESPIRATORY RATE: 18
RH BLD: POSITIVE
RHINOVIRUS RNA SPEC NAA+PROBE: NOT DETECTED
RSV RNA NPH QL NAA+NON-PROBE: NOT DETECTED
S PNEUM AG SPEC QL LA: NEGATIVE
SAO2 % BLDCOA: 21.8 % (ref 94–98)
SAO2 % BLDCOA: 91.9 % (ref 94–98)
SAO2 % BLDCOA: 94.4 % (ref 94–98)
SAO2 % BLDCOA: 97.2 % (ref 94–98)
SAO2 % BLDCOA: 97.2 % (ref 94–98)
SAO2 % BLDCOA: 98 % (ref 94–98)
SAO2 % BLDCOA: 99.9 % (ref 94–98)
SCAN SLIDE: NORMAL
SCAN SLIDE: NORMAL
SODIUM BLD-SCNC: 137 MMOL/L (ref 136–145)
SODIUM BLD-SCNC: 139 MMOL/L (ref 136–145)
SODIUM BLD-SCNC: 142 MMOL/L (ref 136–144)
SODIUM BLD-SCNC: 142 MMOL/L (ref 136–145)
SODIUM BLD-SCNC: 143 MMOL/L (ref 136–145)
SODIUM BLD-SCNC: 143 MMOL/L (ref 136–145)
SODIUM BLD-SCNC: 144 MMOL/L (ref 136–144)
SODIUM BLD-SCNC: 146 MMOL/L (ref 136–145)
SODIUM BLD-SCNC: 147 MMOL/L (ref 136–144)
SODIUM BLD-SCNC: 148 MMOL/L (ref 136–144)
SODIUM BLD-SCNC: 148 MMOL/L (ref 136–145)
SP GR UR STRIP: 1.02 (ref 1–1.03)
SQUAMOUS #/AREA URNS HPF: ABNORMAL /HPF
T&S EXPIRATION DATE: NORMAL
TOXIC GRANULATION: ABNORMAL
TOXIC GRANULATION: ABNORMAL
TROPONIN I SERPL-MCNC: 0.34 NG/ML (ref 0–0.03)
TROPONIN I SERPL-MCNC: 0.34 NG/ML (ref 0–0.03)
TROPONIN I SERPL-MCNC: 0.43 NG/ML (ref 0–0.03)
TROPONIN T SERPL-MCNC: 0.11 NG/ML (ref 0–0.03)
TROPONIN T SERPL-MCNC: 0.13 NG/ML (ref 0–0.03)
TROPONIN T SERPL-MCNC: 0.13 NG/ML (ref 0–0.03)
UROBILINOGEN UR QL STRIP: ABNORMAL
VANCOMYCIN SERPL-MCNC: 16.5 MCG/ML (ref 5–40)
VANCOMYCIN SERPL-MCNC: 20.6 MCG/ML (ref 5–40)
VENTILATOR MODE: ABNORMAL
VT ON VENT VENT: 500 ML
VT ON VENT VENT: 500 ML
WBC NRBC COR # BLD: 13.1 10*3/MM3 (ref 3.4–10.8)
WBC NRBC COR # BLD: 13.4 10*3/MM3 (ref 3.4–10.8)
WBC NRBC COR # BLD: 13.9 10*3/MM3 (ref 3.4–10.8)
WBC NRBC COR # BLD: 16.8 10*3/MM3 (ref 3.4–10.8)
WBC NRBC COR # BLD: 18.8 10*3/MM3 (ref 3.4–10.8)
WBC NRBC COR # BLD: 20.5 10*3/MM3 (ref 3.4–10.8)
WBC NRBC COR # BLD: 21.7 10*3/MM3 (ref 3.4–10.8)
WBC NRBC COR # BLD: 24.9 10*3/MM3 (ref 3.4–10.8)
WBC UR QL AUTO: ABNORMAL /HPF
WHOLE BLOOD HOLD SPECIMEN: NORMAL

## 2019-01-01 PROCEDURE — 87186 SC STD MICRODIL/AGAR DIL: CPT | Performed by: EMERGENCY MEDICINE

## 2019-01-01 PROCEDURE — 76775 US EXAM ABDO BACK WALL LIM: CPT

## 2019-01-01 PROCEDURE — 85025 COMPLETE CBC W/AUTO DIFF WBC: CPT | Performed by: NURSE PRACTITIONER

## 2019-01-01 PROCEDURE — 82803 BLOOD GASES ANY COMBINATION: CPT

## 2019-01-01 PROCEDURE — 99232 SBSQ HOSP IP/OBS MODERATE 35: CPT | Performed by: FAMILY MEDICINE

## 2019-01-01 PROCEDURE — 83605 ASSAY OF LACTIC ACID: CPT | Performed by: INTERNAL MEDICINE

## 2019-01-01 PROCEDURE — 71045 X-RAY EXAM CHEST 1 VIEW: CPT

## 2019-01-01 PROCEDURE — 99285 EMERGENCY DEPT VISIT HI MDM: CPT

## 2019-01-01 PROCEDURE — 86140 C-REACTIVE PROTEIN: CPT | Performed by: EMERGENCY MEDICINE

## 2019-01-01 PROCEDURE — 83735 ASSAY OF MAGNESIUM: CPT | Performed by: NURSE PRACTITIONER

## 2019-01-01 PROCEDURE — 87899 AGENT NOS ASSAY W/OPTIC: CPT | Performed by: NURSE PRACTITIONER

## 2019-01-01 PROCEDURE — 84100 ASSAY OF PHOSPHORUS: CPT | Performed by: NURSE PRACTITIONER

## 2019-01-01 PROCEDURE — 83605 ASSAY OF LACTIC ACID: CPT | Performed by: NURSE PRACTITIONER

## 2019-01-01 PROCEDURE — C1751 CATH, INF, PER/CENT/MIDLINE: HCPCS

## 2019-01-01 PROCEDURE — 99223 1ST HOSP IP/OBS HIGH 75: CPT | Performed by: INTERNAL MEDICINE

## 2019-01-01 PROCEDURE — 93280 PM DEVICE PROGR EVAL DUAL: CPT | Performed by: INTERNAL MEDICINE

## 2019-01-01 PROCEDURE — 25010000002 MEROPENEM PER 100 MG: Performed by: NURSE PRACTITIONER

## 2019-01-01 PROCEDURE — 85730 THROMBOPLASTIN TIME PARTIAL: CPT | Performed by: INTERNAL MEDICINE

## 2019-01-01 PROCEDURE — 25010000002 VANCOMYCIN 750 MG RECONSTITUTED SOLUTION 1 EACH VIAL: Performed by: INTERNAL MEDICINE

## 2019-01-01 PROCEDURE — 83605 ASSAY OF LACTIC ACID: CPT

## 2019-01-01 PROCEDURE — 25010000002 DIGOXIN PER 500 MCG: Performed by: INTERNAL MEDICINE

## 2019-01-01 PROCEDURE — 25010000002 HALOPERIDOL LACTATE PER 5 MG

## 2019-01-01 PROCEDURE — 84484 ASSAY OF TROPONIN QUANT: CPT | Performed by: NURSE PRACTITIONER

## 2019-01-01 PROCEDURE — 82962 GLUCOSE BLOOD TEST: CPT

## 2019-01-01 PROCEDURE — 87081 CULTURE SCREEN ONLY: CPT | Performed by: INTERNAL MEDICINE

## 2019-01-01 PROCEDURE — 25010000002 AMIODARONE PER 30 MG: Performed by: EMERGENCY MEDICINE

## 2019-01-01 PROCEDURE — 97112 NEUROMUSCULAR REEDUCATION: CPT

## 2019-01-01 PROCEDURE — 85007 BL SMEAR W/DIFF WBC COUNT: CPT | Performed by: NURSE PRACTITIONER

## 2019-01-01 PROCEDURE — 83735 ASSAY OF MAGNESIUM: CPT | Performed by: EMERGENCY MEDICINE

## 2019-01-01 PROCEDURE — 36600 WITHDRAWAL OF ARTERIAL BLOOD: CPT

## 2019-01-01 PROCEDURE — 80048 BASIC METABOLIC PNL TOTAL CA: CPT | Performed by: NURSE PRACTITIONER

## 2019-01-01 PROCEDURE — 63710000001 INSULIN LISPRO (HUMAN) PER 5 UNITS: Performed by: INTERNAL MEDICINE

## 2019-01-01 PROCEDURE — 86850 RBC ANTIBODY SCREEN: CPT | Performed by: NURSE PRACTITIONER

## 2019-01-01 PROCEDURE — 80202 ASSAY OF VANCOMYCIN: CPT | Performed by: NURSE PRACTITIONER

## 2019-01-01 PROCEDURE — 85610 PROTHROMBIN TIME: CPT | Performed by: NURSE PRACTITIONER

## 2019-01-01 PROCEDURE — 25010000002 MEROPENEM PER 100 MG: Performed by: EMERGENCY MEDICINE

## 2019-01-01 PROCEDURE — 86901 BLOOD TYPING SEROLOGIC RH(D): CPT | Performed by: NURSE PRACTITIONER

## 2019-01-01 PROCEDURE — 94799 UNLISTED PULMONARY SVC/PX: CPT

## 2019-01-01 PROCEDURE — 25010000002 FUROSEMIDE PER 20 MG: Performed by: INTERNAL MEDICINE

## 2019-01-01 PROCEDURE — 85610 PROTHROMBIN TIME: CPT | Performed by: INTERNAL MEDICINE

## 2019-01-01 PROCEDURE — 74176 CT ABD & PELVIS W/O CONTRAST: CPT

## 2019-01-01 PROCEDURE — 85610 PROTHROMBIN TIME: CPT | Performed by: EMERGENCY MEDICINE

## 2019-01-01 PROCEDURE — 86901 BLOOD TYPING SEROLOGIC RH(D): CPT

## 2019-01-01 PROCEDURE — 25010000002 ONDANSETRON PER 1 MG

## 2019-01-01 PROCEDURE — 87040 BLOOD CULTURE FOR BACTERIA: CPT | Performed by: INTERNAL MEDICINE

## 2019-01-01 PROCEDURE — 99221 1ST HOSP IP/OBS SF/LOW 40: CPT | Performed by: FAMILY MEDICINE

## 2019-01-01 PROCEDURE — 99233 SBSQ HOSP IP/OBS HIGH 50: CPT | Performed by: INTERNAL MEDICINE

## 2019-01-01 PROCEDURE — 99213 OFFICE O/P EST LOW 20 MIN: CPT | Performed by: INTERNAL MEDICINE

## 2019-01-01 PROCEDURE — 25010000002 ONDANSETRON PER 1 MG: Performed by: NURSE PRACTITIONER

## 2019-01-01 PROCEDURE — 83880 ASSAY OF NATRIURETIC PEPTIDE: CPT | Performed by: NURSE PRACTITIONER

## 2019-01-01 PROCEDURE — P9041 ALBUMIN (HUMAN),5%, 50ML: HCPCS | Performed by: NURSE PRACTITIONER

## 2019-01-01 PROCEDURE — 86900 BLOOD TYPING SEROLOGIC ABO: CPT | Performed by: NURSE PRACTITIONER

## 2019-01-01 PROCEDURE — 25010000002 AMIODARONE IN DEXTROSE 5% 150-4.21 MG/100ML-% SOLUTION

## 2019-01-01 PROCEDURE — 85025 COMPLETE CBC W/AUTO DIFF WBC: CPT | Performed by: EMERGENCY MEDICINE

## 2019-01-01 PROCEDURE — 94760 N-INVAS EAR/PLS OXIMETRY 1: CPT

## 2019-01-01 PROCEDURE — 25010000002 AMIODARONE PER 30 MG: Performed by: INTERNAL MEDICINE

## 2019-01-01 PROCEDURE — 84100 ASSAY OF PHOSPHORUS: CPT | Performed by: EMERGENCY MEDICINE

## 2019-01-01 PROCEDURE — 80048 BASIC METABOLIC PNL TOTAL CA: CPT | Performed by: INTERNAL MEDICINE

## 2019-01-01 PROCEDURE — 84484 ASSAY OF TROPONIN QUANT: CPT | Performed by: INTERNAL MEDICINE

## 2019-01-01 PROCEDURE — 93005 ELECTROCARDIOGRAM TRACING: CPT | Performed by: EMERGENCY MEDICINE

## 2019-01-01 PROCEDURE — 25010000002 ALBUMIN HUMAN 5% PER 50 ML: Performed by: NURSE PRACTITIONER

## 2019-01-01 PROCEDURE — 80053 COMPREHEN METABOLIC PANEL: CPT | Performed by: NURSE PRACTITIONER

## 2019-01-01 PROCEDURE — 83880 ASSAY OF NATRIURETIC PEPTIDE: CPT | Performed by: EMERGENCY MEDICINE

## 2019-01-01 PROCEDURE — 99233 SBSQ HOSP IP/OBS HIGH 50: CPT | Performed by: FAMILY MEDICINE

## 2019-01-01 PROCEDURE — 84484 ASSAY OF TROPONIN QUANT: CPT | Performed by: EMERGENCY MEDICINE

## 2019-01-01 PROCEDURE — 25010000002 VANCOMYCIN 10 G RECONSTITUTED SOLUTION: Performed by: INTERNAL MEDICINE

## 2019-01-01 PROCEDURE — 02HV33Z INSERTION OF INFUSION DEVICE INTO SUPERIOR VENA CAVA, PERCUTANEOUS APPROACH: ICD-10-PCS | Performed by: INTERNAL MEDICINE

## 2019-01-01 PROCEDURE — 99232 SBSQ HOSP IP/OBS MODERATE 35: CPT | Performed by: INTERNAL MEDICINE

## 2019-01-01 PROCEDURE — 80076 HEPATIC FUNCTION PANEL: CPT | Performed by: INTERNAL MEDICINE

## 2019-01-01 PROCEDURE — 86140 C-REACTIVE PROTEIN: CPT | Performed by: FAMILY MEDICINE

## 2019-01-01 PROCEDURE — 87086 URINE CULTURE/COLONY COUNT: CPT | Performed by: EMERGENCY MEDICINE

## 2019-01-01 PROCEDURE — 0099U HC BIOFIRE FILMARRAY RESP PANEL 1: CPT | Performed by: EMERGENCY MEDICINE

## 2019-01-01 PROCEDURE — 87076 CULTURE ANAEROBE IDENT EACH: CPT | Performed by: EMERGENCY MEDICINE

## 2019-01-01 PROCEDURE — 04HY32Z INSERTION OF MONITORING DEVICE INTO LOWER ARTERY, PERCUTANEOUS APPROACH: ICD-10-PCS | Performed by: INTERNAL MEDICINE

## 2019-01-01 PROCEDURE — 86900 BLOOD TYPING SEROLOGIC ABO: CPT

## 2019-01-01 PROCEDURE — 85730 THROMBOPLASTIN TIME PARTIAL: CPT | Performed by: EMERGENCY MEDICINE

## 2019-01-01 PROCEDURE — 63710000001 INSULIN LISPRO (HUMAN) PER 5 UNITS: Performed by: NURSE PRACTITIONER

## 2019-01-01 PROCEDURE — 80202 ASSAY OF VANCOMYCIN: CPT | Performed by: INTERNAL MEDICINE

## 2019-01-01 PROCEDURE — 25010000002 AMIODARONE PER 30 MG: Performed by: NURSE PRACTITIONER

## 2019-01-01 PROCEDURE — 25010000002 PHENYLEPHRINE 10 MG/ML SOLUTION: Performed by: NURSE PRACTITIONER

## 2019-01-01 PROCEDURE — 82330 ASSAY OF CALCIUM: CPT | Performed by: EMERGENCY MEDICINE

## 2019-01-01 PROCEDURE — 97166 OT EVAL MOD COMPLEX 45 MIN: CPT

## 2019-01-01 PROCEDURE — 82330 ASSAY OF CALCIUM: CPT | Performed by: INTERNAL MEDICINE

## 2019-01-01 PROCEDURE — 93296 REM INTERROG EVL PM/IDS: CPT | Performed by: INTERNAL MEDICINE

## 2019-01-01 PROCEDURE — 63710000001 INSULIN REGULAR HUMAN PER 5 UNITS: Performed by: NURSE PRACTITIONER

## 2019-01-01 PROCEDURE — 97535 SELF CARE MNGMENT TRAINING: CPT

## 2019-01-01 PROCEDURE — 25010000002 DIGOXIN PER 500 MCG: Performed by: EMERGENCY MEDICINE

## 2019-01-01 PROCEDURE — 74018 RADEX ABDOMEN 1 VIEW: CPT

## 2019-01-01 PROCEDURE — 93306 TTE W/DOPPLER COMPLETE: CPT

## 2019-01-01 PROCEDURE — 84145 PROCALCITONIN (PCT): CPT | Performed by: INTERNAL MEDICINE

## 2019-01-01 PROCEDURE — 93294 REM INTERROG EVL PM/LDLS PM: CPT | Performed by: INTERNAL MEDICINE

## 2019-01-01 PROCEDURE — 25010000002 MEROPENEM PER 100 MG: Performed by: FAMILY MEDICINE

## 2019-01-01 PROCEDURE — 25010000002 VANCOMYCIN 10 G RECONSTITUTED SOLUTION: Performed by: NURSE PRACTITIONER

## 2019-01-01 PROCEDURE — 84145 PROCALCITONIN (PCT): CPT | Performed by: FAMILY MEDICINE

## 2019-01-01 PROCEDURE — 25010000002 FUROSEMIDE PER 20 MG: Performed by: FAMILY MEDICINE

## 2019-01-01 PROCEDURE — 85027 COMPLETE CBC AUTOMATED: CPT | Performed by: NURSE PRACTITIONER

## 2019-01-01 PROCEDURE — 25010000002 CALCIUM GLUCONATE PER 10 ML: Performed by: NURSE PRACTITIONER

## 2019-01-01 PROCEDURE — 93306 TTE W/DOPPLER COMPLETE: CPT | Performed by: INTERNAL MEDICINE

## 2019-01-01 PROCEDURE — 83605 ASSAY OF LACTIC ACID: CPT | Performed by: FAMILY MEDICINE

## 2019-01-01 PROCEDURE — 87040 BLOOD CULTURE FOR BACTERIA: CPT | Performed by: EMERGENCY MEDICINE

## 2019-01-01 PROCEDURE — 87088 URINE BACTERIA CULTURE: CPT | Performed by: EMERGENCY MEDICINE

## 2019-01-01 PROCEDURE — 80053 COMPREHEN METABOLIC PANEL: CPT | Performed by: EMERGENCY MEDICINE

## 2019-01-01 PROCEDURE — 97162 PT EVAL MOD COMPLEX 30 MIN: CPT

## 2019-01-01 PROCEDURE — 81001 URINALYSIS AUTO W/SCOPE: CPT | Performed by: EMERGENCY MEDICINE

## 2019-01-01 PROCEDURE — 94660 CPAP INITIATION&MGMT: CPT

## 2019-01-01 RX ORDER — AMIODARONE HYDROCHLORIDE 50 MG/ML
INJECTION, SOLUTION INTRAVENOUS
Status: DISPENSED
Start: 2019-01-01 | End: 2019-01-01

## 2019-01-01 RX ORDER — DILTIAZEM HCL IN NACL,ISO-OSM 125 MG/125
5 PLASTIC BAG, INJECTION (ML) INTRAVENOUS
Status: DISCONTINUED | OUTPATIENT
Start: 2019-01-01 | End: 2019-01-01

## 2019-01-01 RX ORDER — DEXTROSE MONOHYDRATE 25 G/50ML
25 INJECTION, SOLUTION INTRAVENOUS
Status: DISCONTINUED | OUTPATIENT
Start: 2019-01-01 | End: 2019-01-01 | Stop reason: HOSPADM

## 2019-01-01 RX ORDER — SODIUM CHLORIDE 0.9 % (FLUSH) 0.9 %
10 SYRINGE (ML) INJECTION AS NEEDED
Status: DISCONTINUED | OUTPATIENT
Start: 2019-01-01 | End: 2019-01-01 | Stop reason: HOSPADM

## 2019-01-01 RX ORDER — SODIUM CHLORIDE 450 MG/100ML
75 INJECTION, SOLUTION INTRAVENOUS CONTINUOUS
Status: DISCONTINUED | OUTPATIENT
Start: 2019-01-01 | End: 2019-01-01

## 2019-01-01 RX ORDER — PRENATAL VIT/IRON FUM/FOLIC AC 27MG-0.8MG
1 TABLET ORAL DAILY
Status: DISCONTINUED | OUTPATIENT
Start: 2019-01-01 | End: 2019-01-01 | Stop reason: HOSPADM

## 2019-01-01 RX ORDER — LEVOTHYROXINE SODIUM 0.07 MG/1
75 TABLET ORAL
Status: DISCONTINUED | OUTPATIENT
Start: 2019-01-01 | End: 2019-01-01 | Stop reason: HOSPADM

## 2019-01-01 RX ORDER — AMIODARONE HCL/D5W 450 MG/250
1 PLASTIC BAG, INJECTION (ML) INTRAVENOUS CONTINUOUS
Status: DISPENSED | OUTPATIENT
Start: 2019-01-01 | End: 2019-01-01

## 2019-01-01 RX ORDER — SODIUM CHLORIDE 9 MG/ML
75 INJECTION, SOLUTION INTRAVENOUS CONTINUOUS
Status: DISCONTINUED | OUTPATIENT
Start: 2019-01-01 | End: 2019-01-01

## 2019-01-01 RX ORDER — ALBUMIN, HUMAN INJ 5% 5 %
250 SOLUTION INTRAVENOUS ONCE
Status: COMPLETED | OUTPATIENT
Start: 2019-01-01 | End: 2019-01-01

## 2019-01-01 RX ORDER — METOPROLOL TARTRATE 50 MG/1
50 TABLET, FILM COATED ORAL 2 TIMES DAILY
COMMUNITY
Start: 2019-01-01

## 2019-01-01 RX ORDER — MIRTAZAPINE 15 MG/1
7.5 TABLET, FILM COATED ORAL NIGHTLY
Status: DISCONTINUED | OUTPATIENT
Start: 2019-01-01 | End: 2019-01-01 | Stop reason: HOSPADM

## 2019-01-01 RX ORDER — NOREPINEPHRINE BIT/0.9 % NACL 8 MG/250ML
.02-.3 INFUSION BOTTLE (ML) INTRAVENOUS
Status: DISCONTINUED | OUTPATIENT
Start: 2019-01-01 | End: 2019-01-01 | Stop reason: HOSPADM

## 2019-01-01 RX ORDER — DIGOXIN 0.25 MG/ML
250 INJECTION INTRAMUSCULAR; INTRAVENOUS ONCE
Status: COMPLETED | OUTPATIENT
Start: 2019-01-01 | End: 2019-01-01

## 2019-01-01 RX ORDER — AMIODARONE HCL/D5W 450 MG/250
0.5 PLASTIC BAG, INJECTION (ML) INTRAVENOUS CONTINUOUS
Status: DISCONTINUED | OUTPATIENT
Start: 2019-01-01 | End: 2019-01-01

## 2019-01-01 RX ORDER — DEXTROSE MONOHYDRATE 25 G/50ML
50 INJECTION, SOLUTION INTRAVENOUS ONCE
Status: COMPLETED | OUTPATIENT
Start: 2019-01-01 | End: 2019-01-01

## 2019-01-01 RX ORDER — POTASSIUM CHLORIDE 1.5 G/1.77G
20 POWDER, FOR SOLUTION ORAL ONCE
Status: DISCONTINUED | OUTPATIENT
Start: 2019-01-01 | End: 2019-01-01

## 2019-01-01 RX ORDER — DILTIAZEM HYDROCHLORIDE 5 MG/ML
10 INJECTION INTRAVENOUS ONCE
Status: COMPLETED | OUTPATIENT
Start: 2019-01-01 | End: 2019-01-01

## 2019-01-01 RX ORDER — SERTRALINE HYDROCHLORIDE 25 MG/1
25 TABLET, FILM COATED ORAL DAILY
COMMUNITY
Start: 2019-01-01

## 2019-01-01 RX ORDER — POTASSIUM CHLORIDE 20 MEQ/1
20 TABLET, EXTENDED RELEASE ORAL DAILY
Status: DISCONTINUED | OUTPATIENT
Start: 2019-01-01 | End: 2019-01-01

## 2019-01-01 RX ORDER — ONDANSETRON 2 MG/ML
INJECTION INTRAMUSCULAR; INTRAVENOUS
Status: COMPLETED
Start: 2019-01-01 | End: 2019-01-01

## 2019-01-01 RX ORDER — NOREPINEPHRINE BIT/0.9 % NACL 8 MG/250ML
.02-.3 INFUSION BOTTLE (ML) INTRAVENOUS
Status: DISCONTINUED | OUTPATIENT
Start: 2019-01-01 | End: 2019-01-01

## 2019-01-01 RX ORDER — HALOPERIDOL 5 MG/ML
2 INJECTION INTRAMUSCULAR ONCE
Status: COMPLETED | OUTPATIENT
Start: 2019-01-01 | End: 2019-01-01

## 2019-01-01 RX ORDER — SACCHAROMYCES BOULARDII 250 MG
CAPSULE ORAL
COMMUNITY
Start: 2018-06-11

## 2019-01-01 RX ORDER — APIXABAN 2.5 MG/1
2.5 TABLET, FILM COATED ORAL 2 TIMES DAILY
COMMUNITY
Start: 2019-01-01

## 2019-01-01 RX ORDER — SODIUM CHLORIDE 0.9 % (FLUSH) 0.9 %
10 SYRINGE (ML) INJECTION EVERY 12 HOURS SCHEDULED
Status: DISCONTINUED | OUTPATIENT
Start: 2019-01-01 | End: 2019-01-01 | Stop reason: HOSPADM

## 2019-01-01 RX ORDER — METOPROLOL TARTRATE 5 MG/5ML
2.5 INJECTION INTRAVENOUS EVERY 6 HOURS PRN
Status: DISCONTINUED | OUTPATIENT
Start: 2019-01-01 | End: 2019-01-01 | Stop reason: HOSPADM

## 2019-01-01 RX ORDER — LORATADINE 10 MG/1
10 TABLET ORAL DAILY
COMMUNITY

## 2019-01-01 RX ORDER — FUROSEMIDE 10 MG/ML
40 INJECTION INTRAMUSCULAR; INTRAVENOUS ONCE
Status: COMPLETED | OUTPATIENT
Start: 2019-01-01 | End: 2019-01-01

## 2019-01-01 RX ORDER — FLUOROMETHOLONE 0.1 %
1 SUSPENSION, DROPS(FINAL DOSAGE FORM)(ML) OPHTHALMIC (EYE) DAILY
COMMUNITY
Start: 2019-01-01

## 2019-01-01 RX ORDER — NICOTINE POLACRILEX 4 MG
15 LOZENGE BUCCAL
Status: DISCONTINUED | OUTPATIENT
Start: 2019-01-01 | End: 2019-01-01 | Stop reason: HOSPADM

## 2019-01-01 RX ORDER — LEVOTHYROXINE SODIUM 0.07 MG/1
TABLET ORAL
COMMUNITY
Start: 2019-01-01

## 2019-01-01 RX ORDER — MELATONIN
EVERY 24 HOURS
COMMUNITY
Start: 2017-09-29

## 2019-01-01 RX ORDER — METOPROLOL TARTRATE 50 MG/1
50 TABLET, FILM COATED ORAL EVERY 12 HOURS SCHEDULED
Status: DISCONTINUED | OUTPATIENT
Start: 2019-01-01 | End: 2019-01-01

## 2019-01-01 RX ORDER — CALCIUM CARBONATE 300MG(750)
TABLET,CHEWABLE ORAL
COMMUNITY
Start: 2018-01-01

## 2019-01-01 RX ORDER — FAMOTIDINE 20 MG/1
20 TABLET, FILM COATED ORAL
Status: DISCONTINUED | OUTPATIENT
Start: 2019-01-01 | End: 2019-01-01

## 2019-01-01 RX ORDER — DIGOXIN 0.25 MG/ML
500 INJECTION INTRAMUSCULAR; INTRAVENOUS ONCE
Status: COMPLETED | OUTPATIENT
Start: 2019-01-01 | End: 2019-01-01

## 2019-01-01 RX ORDER — SODIUM CHLORIDE 9 MG/ML
100 INJECTION, SOLUTION INTRAVENOUS CONTINUOUS
Status: DISCONTINUED | OUTPATIENT
Start: 2019-01-01 | End: 2019-01-01 | Stop reason: HOSPADM

## 2019-01-01 RX ORDER — FUROSEMIDE 10 MG/ML
40 INJECTION INTRAMUSCULAR; INTRAVENOUS EVERY 6 HOURS
Status: DISCONTINUED | OUTPATIENT
Start: 2019-01-01 | End: 2019-01-01 | Stop reason: HOSPADM

## 2019-01-01 RX ORDER — AMIODARONE HCL/D5W 450 MG/250
0.5 PLASTIC BAG, INJECTION (ML) INTRAVENOUS CONTINUOUS
Status: DISPENSED | OUTPATIENT
Start: 2019-01-01 | End: 2019-01-01

## 2019-01-01 RX ORDER — DILTIAZEM HCL IN NACL,ISO-OSM 125 MG/125
PLASTIC BAG, INJECTION (ML) INTRAVENOUS
Status: COMPLETED
Start: 2019-01-01 | End: 2019-01-01

## 2019-01-01 RX ORDER — POLYVINYL ALCOHOL 14 MG/ML
SOLUTION/ DROPS OPHTHALMIC EVERY 12 HOURS
COMMUNITY
Start: 2018-01-01

## 2019-01-01 RX ORDER — MIRTAZAPINE 7.5 MG/1
7.5 TABLET, FILM COATED ORAL DAILY
COMMUNITY
Start: 2019-01-01

## 2019-01-01 RX ORDER — ONDANSETRON 2 MG/ML
4 INJECTION INTRAMUSCULAR; INTRAVENOUS EVERY 6 HOURS PRN
Status: DISCONTINUED | OUTPATIENT
Start: 2019-01-01 | End: 2019-01-01 | Stop reason: HOSPADM

## 2019-01-01 RX ORDER — PHENYLEPHRINE HCL IN 0.9% NACL 0.5 MG/5ML
.5-3 SYRINGE (ML) INTRAVENOUS
Status: DISCONTINUED | OUTPATIENT
Start: 2019-01-01 | End: 2019-01-01

## 2019-01-01 RX ORDER — HALOPERIDOL 5 MG/ML
INJECTION INTRAMUSCULAR
Status: COMPLETED
Start: 2019-01-01 | End: 2019-01-01

## 2019-01-01 RX ADMIN — LEVOTHYROXINE SODIUM 75 MCG: 75 TABLET ORAL at 06:32

## 2019-01-01 RX ADMIN — CALCIUM GLUCONATE 2 G: 98 INJECTION, SOLUTION INTRAVENOUS at 11:32

## 2019-01-01 RX ADMIN — APIXABAN 2.5 MG: 2.5 TABLET, FILM COATED ORAL at 11:54

## 2019-01-01 RX ADMIN — VANCOMYCIN HYDROCHLORIDE 1000 MG: 10 INJECTION, POWDER, LYOPHILIZED, FOR SOLUTION INTRAVENOUS at 00:30

## 2019-01-01 RX ADMIN — SODIUM BICARBONATE 50 MEQ: 84 INJECTION, SOLUTION INTRAVENOUS at 22:07

## 2019-01-01 RX ADMIN — APIXABAN 2.5 MG: 2.5 TABLET, FILM COATED ORAL at 10:32

## 2019-01-01 RX ADMIN — MEROPENEM 500 MG: 500 INJECTION, POWDER, FOR SOLUTION INTRAVENOUS at 15:33

## 2019-01-01 RX ADMIN — AMIODARONE HYDROCHLORIDE 150 MG: 150 INJECTION, SOLUTION INTRAVENOUS at 17:46

## 2019-01-01 RX ADMIN — Medication 10 ML: at 20:34

## 2019-01-01 RX ADMIN — FAMOTIDINE 20 MG: 20 TABLET ORAL at 06:06

## 2019-01-01 RX ADMIN — METOPROLOL TARTRATE 2.5 MG: 5 INJECTION INTRAVENOUS at 23:20

## 2019-01-01 RX ADMIN — METOPROLOL TARTRATE 25 MG: 25 TABLET, FILM COATED ORAL at 08:20

## 2019-01-01 RX ADMIN — SODIUM CHLORIDE 75 ML/HR: 900 INJECTION, SOLUTION INTRAVENOUS at 20:45

## 2019-01-01 RX ADMIN — DIGOXIN: 250 INJECTION, SOLUTION INTRAMUSCULAR; INTRAVENOUS; PARENTERAL at 17:39

## 2019-01-01 RX ADMIN — APIXABAN 2.5 MG: 2.5 TABLET, FILM COATED ORAL at 20:43

## 2019-01-01 RX ADMIN — SODIUM CHLORIDE 75 ML/HR: 450 INJECTION, SOLUTION INTRAVENOUS at 15:40

## 2019-01-01 RX ADMIN — APIXABAN 2.5 MG: 2.5 TABLET, FILM COATED ORAL at 20:34

## 2019-01-01 RX ADMIN — DEXAMETHASONE 1 DROP: 1 SUSPENSION OPHTHALMIC at 15:33

## 2019-01-01 RX ADMIN — DEXAMETHASONE 1 DROP: 1 SUSPENSION OPHTHALMIC at 15:14

## 2019-01-01 RX ADMIN — DILTIAZEM HYDROCHLORIDE 10 MG: 5 INJECTION INTRAVENOUS at 16:15

## 2019-01-01 RX ADMIN — ONDANSETRON 4 MG: 2 INJECTION INTRAMUSCULAR; INTRAVENOUS at 22:32

## 2019-01-01 RX ADMIN — DEXTROSE 50 % IN WATER (D50W) INTRAVENOUS SYRINGE 50 ML: at 22:07

## 2019-01-01 RX ADMIN — HALOPERIDOL 2 MG: 5 INJECTION INTRAMUSCULAR at 07:37

## 2019-01-01 RX ADMIN — SODIUM BICARBONATE 100 MEQ: 84 INJECTION, SOLUTION INTRAVENOUS at 00:26

## 2019-01-01 RX ADMIN — PHENYLEPHRINE HYDROCHLORIDE 1 MCG/KG/MIN: 10 INJECTION INTRAVENOUS at 21:01

## 2019-01-01 RX ADMIN — MEROPENEM 500 MG: 500 INJECTION, POWDER, FOR SOLUTION INTRAVENOUS at 16:51

## 2019-01-01 RX ADMIN — SODIUM CHLORIDE 1000 ML: 0.9 INJECTION, SOLUTION INTRAVENOUS at 08:30

## 2019-01-01 RX ADMIN — Medication 10 ML: at 20:40

## 2019-01-01 RX ADMIN — DEXAMETHASONE 1 DROP: 1 SUSPENSION OPHTHALMIC at 21:30

## 2019-01-01 RX ADMIN — AMIODARONE HYDROCHLORIDE 1 MG/MIN: 50 INJECTION, SOLUTION INTRAVENOUS at 21:47

## 2019-01-01 RX ADMIN — AMIODARONE HYDROCHLORIDE 0.5 MG/MIN: 50 INJECTION, SOLUTION INTRAVENOUS at 23:28

## 2019-01-01 RX ADMIN — SODIUM CHLORIDE 75 ML/HR: 450 INJECTION, SOLUTION INTRAVENOUS at 20:40

## 2019-01-01 RX ADMIN — APIXABAN 2.5 MG: 2.5 TABLET, FILM COATED ORAL at 21:28

## 2019-01-01 RX ADMIN — POLYPROPYLENE GLYCOL 400, PROPYLENE GLYCOL 1 DROP: .4; .3 LIQUID OPHTHALMIC at 08:02

## 2019-01-01 RX ADMIN — INSULIN LISPRO 2 UNITS: 100 INJECTION, SOLUTION INTRAVENOUS; SUBCUTANEOUS at 05:59

## 2019-01-01 RX ADMIN — SODIUM CHLORIDE 100 ML/HR: 900 INJECTION, SOLUTION INTRAVENOUS at 21:30

## 2019-01-01 RX ADMIN — APIXABAN 2.5 MG: 2.5 TABLET, FILM COATED ORAL at 08:01

## 2019-01-01 RX ADMIN — MEROPENEM 500 MG: 500 INJECTION, POWDER, FOR SOLUTION INTRAVENOUS at 03:20

## 2019-01-01 RX ADMIN — MEROPENEM 500 MG: 500 INJECTION, POWDER, FOR SOLUTION INTRAVENOUS at 17:00

## 2019-01-01 RX ADMIN — MIRTAZAPINE 7.5 MG: 15 TABLET, FILM COATED ORAL at 20:34

## 2019-01-01 RX ADMIN — MIRTAZAPINE 7.5 MG: 15 TABLET, FILM COATED ORAL at 21:29

## 2019-01-01 RX ADMIN — METOPROLOL TARTRATE 50 MG: 50 TABLET, FILM COATED ORAL at 22:07

## 2019-01-01 RX ADMIN — CALCIUM GLUCONATE 2 G: 98 INJECTION, SOLUTION INTRAVENOUS at 19:54

## 2019-01-01 RX ADMIN — ONDANSETRON 4 MG: 2 INJECTION INTRAMUSCULAR; INTRAVENOUS at 00:15

## 2019-01-01 RX ADMIN — LEVOTHYROXINE SODIUM 75 MCG: 75 TABLET ORAL at 11:26

## 2019-01-01 RX ADMIN — POLYPROPYLENE GLYCOL 400, PROPYLENE GLYCOL 1 DROP: .4; .3 LIQUID OPHTHALMIC at 00:14

## 2019-01-01 RX ADMIN — FUROSEMIDE 40 MG: 10 INJECTION, SOLUTION INTRAMUSCULAR; INTRAVENOUS at 12:48

## 2019-01-01 RX ADMIN — PHENYLEPHRINE HYDROCHLORIDE 1.7 MCG/KG/MIN: 10 INJECTION INTRAVENOUS at 21:17

## 2019-01-01 RX ADMIN — SERTRALINE HYDROCHLORIDE 25 MG: 50 TABLET ORAL at 11:26

## 2019-01-01 RX ADMIN — DEXAMETHASONE 1 DROP: 1 SUSPENSION OPHTHALMIC at 07:44

## 2019-01-01 RX ADMIN — DEXAMETHASONE 1 DROP: 1 SUSPENSION OPHTHALMIC at 21:38

## 2019-01-01 RX ADMIN — DEXTROSE 50 % IN WATER (D50W) INTRAVENOUS SYRINGE 25 G: at 12:24

## 2019-01-01 RX ADMIN — MIRTAZAPINE 7.5 MG: 15 TABLET, FILM COATED ORAL at 20:44

## 2019-01-01 RX ADMIN — POLYPROPYLENE GLYCOL 400, PROPYLENE GLYCOL 1 DROP: .4; .3 LIQUID OPHTHALMIC at 13:08

## 2019-01-01 RX ADMIN — AMIODARONE HYDROCHLORIDE 0.5 MG/MIN: 50 INJECTION, SOLUTION INTRAVENOUS at 11:32

## 2019-01-01 RX ADMIN — MEROPENEM 1 G: 1 INJECTION, POWDER, FOR SOLUTION INTRAVENOUS at 14:08

## 2019-01-01 RX ADMIN — DEXAMETHASONE 1 DROP: 1 SUSPENSION OPHTHALMIC at 13:07

## 2019-01-01 RX ADMIN — LEVOTHYROXINE SODIUM 75 MCG: 75 TABLET ORAL at 08:20

## 2019-01-01 RX ADMIN — POTASSIUM CHLORIDE 20 MEQ: 1500 TABLET, EXTENDED RELEASE ORAL at 08:20

## 2019-01-01 RX ADMIN — DEXAMETHASONE 1 DROP: 1 SUSPENSION OPHTHALMIC at 00:13

## 2019-01-01 RX ADMIN — POTASSIUM CHLORIDE 20 MEQ: 1500 TABLET, EXTENDED RELEASE ORAL at 09:19

## 2019-01-01 RX ADMIN — Medication 10 ML: at 20:43

## 2019-01-01 RX ADMIN — POLYPROPYLENE GLYCOL 400, PROPYLENE GLYCOL 1 DROP: .4; .3 LIQUID OPHTHALMIC at 10:35

## 2019-01-01 RX ADMIN — APIXABAN 2.5 MG: 2.5 TABLET, FILM COATED ORAL at 08:20

## 2019-01-01 RX ADMIN — POTASSIUM CHLORIDE 20 MEQ: 1500 TABLET, EXTENDED RELEASE ORAL at 08:01

## 2019-01-01 RX ADMIN — INSULIN HUMAN 10 UNITS: 100 INJECTION, SOLUTION PARENTERAL at 22:05

## 2019-01-01 RX ADMIN — HALOPERIDOL LACTATE 2 MG: 5 INJECTION INTRAMUSCULAR at 07:37

## 2019-01-01 RX ADMIN — MIRTAZAPINE 7.5 MG: 15 TABLET, FILM COATED ORAL at 20:40

## 2019-01-01 RX ADMIN — PHENYLEPHRINE HYDROCHLORIDE 2.2 MCG/KG/MIN: 10 INJECTION INTRAVENOUS at 11:33

## 2019-01-01 RX ADMIN — INSULIN LISPRO 2 UNITS: 100 INJECTION, SOLUTION INTRAVENOUS; SUBCUTANEOUS at 08:21

## 2019-01-01 RX ADMIN — AMIODARONE HYDROCHLORIDE 150 MG: 1.5 INJECTION, SOLUTION INTRAVENOUS at 21:25

## 2019-01-01 RX ADMIN — POLYPROPYLENE GLYCOL 400, PROPYLENE GLYCOL 1 DROP: .4; .3 LIQUID OPHTHALMIC at 08:06

## 2019-01-01 RX ADMIN — SODIUM CHLORIDE 75 ML/HR: 450 INJECTION, SOLUTION INTRAVENOUS at 17:00

## 2019-01-01 RX ADMIN — Medication 10 ML: at 21:32

## 2019-01-01 RX ADMIN — DEXAMETHASONE 1 DROP: 1 SUSPENSION OPHTHALMIC at 04:47

## 2019-01-01 RX ADMIN — SODIUM CHLORIDE 500 ML: 0.9 INJECTION, SOLUTION INTRAVENOUS at 16:23

## 2019-01-01 RX ADMIN — Medication 10 ML: at 08:02

## 2019-01-01 RX ADMIN — SODIUM CHLORIDE 1449 ML: 900 INJECTION, SOLUTION INTRAVENOUS at 17:00

## 2019-01-01 RX ADMIN — MIRTAZAPINE 7.5 MG: 15 TABLET, FILM COATED ORAL at 22:06

## 2019-01-01 RX ADMIN — DIGOXIN 500 MCG: 250 INJECTION, SOLUTION INTRAMUSCULAR; INTRAVENOUS; PARENTERAL at 11:55

## 2019-01-01 RX ADMIN — DEXAMETHASONE 1 DROP: 1 SUSPENSION OPHTHALMIC at 21:17

## 2019-01-01 RX ADMIN — DILTIAZEM HYDROCHLORIDE 125 MG: 5 INJECTION INTRAVENOUS at 16:10

## 2019-01-01 RX ADMIN — Medication 10 ML: at 20:50

## 2019-01-01 RX ADMIN — VANCOMYCIN HYDROCHLORIDE 750 MG: 750 INJECTION, POWDER, LYOPHILIZED, FOR SOLUTION INTRAVENOUS at 14:58

## 2019-01-01 RX ADMIN — AMIODARONE HYDROCHLORIDE 0.5 MG/MIN: 50 INJECTION, SOLUTION INTRAVENOUS at 12:13

## 2019-01-01 RX ADMIN — AMIODARONE HYDROCHLORIDE 0.5 MG/MIN: 50 INJECTION, SOLUTION INTRAVENOUS at 14:49

## 2019-01-01 RX ADMIN — INSULIN LISPRO 2 UNITS: 100 INJECTION, SOLUTION INTRAVENOUS; SUBCUTANEOUS at 18:39

## 2019-01-01 RX ADMIN — INSULIN LISPRO 2 UNITS: 100 INJECTION, SOLUTION INTRAVENOUS; SUBCUTANEOUS at 00:18

## 2019-01-01 RX ADMIN — POLYPROPYLENE GLYCOL 400, PROPYLENE GLYCOL 1 DROP: .4; .3 LIQUID OPHTHALMIC at 21:30

## 2019-01-01 RX ADMIN — METOPROLOL TARTRATE 50 MG: 50 TABLET, FILM COATED ORAL at 10:33

## 2019-01-01 RX ADMIN — DEXTROSE MONOHYDRATE 150 MEQ: 5 INJECTION, SOLUTION INTRAVENOUS at 00:30

## 2019-01-01 RX ADMIN — MEROPENEM 500 MG: 500 INJECTION, POWDER, FOR SOLUTION INTRAVENOUS at 18:04

## 2019-01-01 RX ADMIN — MEROPENEM 500 MG: 500 INJECTION, POWDER, FOR SOLUTION INTRAVENOUS at 18:17

## 2019-01-01 RX ADMIN — Medication 10 ML: at 22:08

## 2019-01-01 RX ADMIN — AMIODARONE HYDROCHLORIDE 0.5 MG/MIN: 50 INJECTION, SOLUTION INTRAVENOUS at 05:59

## 2019-01-01 RX ADMIN — FAMOTIDINE 20 MG: 20 TABLET ORAL at 05:59

## 2019-01-01 RX ADMIN — Medication 10 ML: at 10:34

## 2019-01-01 RX ADMIN — ONDANSETRON 4 MG: 2 INJECTION INTRAMUSCULAR; INTRAVENOUS at 23:10

## 2019-01-01 RX ADMIN — PRENATAL VIT W/ FE FUMARATE-FA TAB 27-0.8 MG 1 TABLET: 27-0.8 TAB at 10:33

## 2019-01-01 RX ADMIN — PHENYLEPHRINE HYDROCHLORIDE 2 MCG/KG/MIN: 10 INJECTION INTRAVENOUS at 05:59

## 2019-01-01 RX ADMIN — Medication 10 ML: at 08:03

## 2019-01-01 RX ADMIN — DEXAMETHASONE 1 DROP: 1 SUSPENSION OPHTHALMIC at 13:31

## 2019-01-01 RX ADMIN — LEVOTHYROXINE SODIUM 75 MCG: 75 TABLET ORAL at 07:47

## 2019-01-01 RX ADMIN — SERTRALINE HYDROCHLORIDE 25 MG: 50 TABLET ORAL at 08:01

## 2019-01-01 RX ADMIN — POLYPROPYLENE GLYCOL 400, PROPYLENE GLYCOL 1 DROP: .4; .3 LIQUID OPHTHALMIC at 20:40

## 2019-01-01 RX ADMIN — VANCOMYCIN HYDROCHLORIDE 1250 MG: 10 INJECTION, POWDER, LYOPHILIZED, FOR SOLUTION INTRAVENOUS at 14:07

## 2019-01-01 RX ADMIN — INSULIN LISPRO 3 UNITS: 100 INJECTION, SOLUTION INTRAVENOUS; SUBCUTANEOUS at 13:31

## 2019-01-01 RX ADMIN — SERTRALINE HYDROCHLORIDE 25 MG: 50 TABLET ORAL at 08:20

## 2019-01-01 RX ADMIN — ALBUMIN HUMAN 250 ML: 0.05 INJECTION, SOLUTION INTRAVENOUS at 05:50

## 2019-01-01 RX ADMIN — MEROPENEM 1 G: 1 INJECTION, POWDER, FOR SOLUTION INTRAVENOUS at 17:04

## 2019-01-01 RX ADMIN — FUROSEMIDE 40 MG: 10 INJECTION, SOLUTION INTRAMUSCULAR; INTRAVENOUS at 14:08

## 2019-01-01 RX ADMIN — METOPROLOL TARTRATE 2.5 MG: 5 INJECTION INTRAVENOUS at 14:54

## 2019-01-01 RX ADMIN — POTASSIUM CHLORIDE 20 MEQ: 1500 TABLET, EXTENDED RELEASE ORAL at 10:32

## 2019-01-01 RX ADMIN — SERTRALINE HYDROCHLORIDE 25 MG: 50 TABLET ORAL at 08:06

## 2019-01-01 RX ADMIN — SERTRALINE HYDROCHLORIDE 25 MG: 50 TABLET ORAL at 10:33

## 2019-01-01 RX ADMIN — DEXAMETHASONE 1 DROP: 1 SUSPENSION OPHTHALMIC at 06:06

## 2019-01-01 RX ADMIN — INSULIN LISPRO 2 UNITS: 100 INJECTION, SOLUTION INTRAVENOUS; SUBCUTANEOUS at 11:54

## 2019-01-01 RX ADMIN — POLYPROPYLENE GLYCOL 400, PROPYLENE GLYCOL 1 DROP: .4; .3 LIQUID OPHTHALMIC at 20:34

## 2019-01-01 RX ADMIN — Medication 10 ML: at 08:06

## 2019-01-01 RX ADMIN — NOREPINEPHRINE BITARTRATE 0.1 MCG/KG/MIN: 1 INJECTION, SOLUTION, CONCENTRATE INTRAVENOUS at 19:41

## 2019-01-01 RX ADMIN — ONDANSETRON 4 MG: 2 INJECTION INTRAMUSCULAR; INTRAVENOUS at 00:37

## 2019-01-01 RX ADMIN — SODIUM BICARBONATE 50 MEQ: 84 INJECTION, SOLUTION INTRAVENOUS at 05:55

## 2019-01-01 RX ADMIN — DEXAMETHASONE 1 DROP: 1 SUSPENSION OPHTHALMIC at 05:41

## 2019-01-01 RX ADMIN — DEXTROSE MONOHYDRATE 150 MEQ: 5 INJECTION, SOLUTION INTRAVENOUS at 11:32

## 2019-01-01 RX ADMIN — NOREPINEPHRINE BITARTRATE 0.1 MCG/KG/MIN: 1 INJECTION, SOLUTION, CONCENTRATE INTRAVENOUS at 08:05

## 2019-01-01 RX ADMIN — APIXABAN 2.5 MG: 2.5 TABLET, FILM COATED ORAL at 22:06

## 2019-01-01 RX ADMIN — AMIODARONE HYDROCHLORIDE 0.5 MG/MIN: 50 INJECTION, SOLUTION INTRAVENOUS at 09:16

## 2019-01-01 RX ADMIN — LEVOTHYROXINE SODIUM 75 MCG: 75 TABLET ORAL at 10:33

## 2019-01-01 RX ADMIN — METOPROLOL TARTRATE 12.5 MG: 25 TABLET, FILM COATED ORAL at 20:43

## 2019-06-26 NOTE — PROGRESS NOTES
"    Subjective:     Encounter Date:06/26/2019      Patient ID: Dinora Vásquez is a 89 y.o. female.    Chief Complaint:  History of Present Illness 89-year-old white female patient with previous history of DVT and pulmonary embolus history of paroxysmal atrial fibrillation and sinus bradycardia Status post permanent pacemaker placement comes back for follow-up       echocardiogram LV function normal without any significant aortic stenosis, biatrial enlargement noted mild pulmonary hypertension noted 2017    She was admitted to the hospital in February 2019 with dehydration and paroxysmal atrial fibrillation  Patient is stable now    pacemaker site looks good     patient was seen by up  ophthalmology and was recommended to stop the amiodarone   so I advised her to completely stop it   EKG today atrial pacer rhythm   discussed with the patient and family if any problems with recurrent AFib will need to try alternative medicines   follow-up in the next 6 months with pacemaker check        /62 (BP Location: Left arm, Patient Position: Sitting, Cuff Size: Adult)   Pulse 65   Ht 154.9 cm (61\")   Wt 48.3 kg (106 lb 8 oz)   SpO2 98%   BMI 20.12 kg/m²     Past Medical History:   Diagnosis Date   • Ankle wound, right, initial encounter    • Aortic stenosis    • Atrial fibrillation (CMS/HCC)    • Coronary artery disease    • DVT (deep venous thrombosis) (CMS/HCC)    • Hypothyroidism    • Left bundle branch block    • Pulmonary embolus (CMS/HCC)    • Pulmonary hypertension (CMS/HCC)    • Sinus bradycardia      Past Surgical History:   Procedure Laterality Date   • COLOSTOMY     • ORIF ANKLE FRACTURE Right 2013    Right Ankle ORIF    • PACEMAKER IMPLANTATION  11/29/2017    Dual Chamber [employer]   • TOTAL ABDOMINAL HYSTERECTOMY       Social History     Socioeconomic History   • Marital status:      Spouse name: Not on file   • Number of children: Not on file   • Years of education: Not on file   • " Highest education level: Not on file   Occupational History   • Occupation: Retired   Tobacco Use   • Smoking status: Never Smoker   • Smokeless tobacco: Never Used   Substance and Sexual Activity   • Alcohol use: No     Frequency: Never   • Drug use: No     Family History   Problem Relation Age of Onset   • Heart disease Mother    • Diabetes Mother    • Heart disease Sister    • Diabetes Sister        Current Outpatient Medications:   •  cholecalciferol (VITAMIN D3) 1000 units tablet, Daily., Disp: , Rfl:   •  Cranberry 600 MG tablet, Take 600 mg by mouth 3 (Three) Times a Day., Disp: , Rfl:   •  loratadine (CLARITIN) 10 MG tablet, Take 10 mg by mouth Daily., Disp: , Rfl:   •  Magnesium 400 MG tablet, MAGNESIUM 400 MG TABS, Disp: , Rfl:   •  polyvinyl alcohol (ARTIFICIAL TEARS) 1.4 % ophthalmic solution, Every 12 (Twelve) Hours., Disp: , Rfl:   •  Prenatal w/o A Vit-Fe Fum-FA (BP MULTINATAL PLUS) 30-1 MG tablet, Daily., Disp: , Rfl:   •  saccharomyces boulardii (FLORASTOR) 250 MG capsule, FLORASTOR 250 MG CAPS, Disp: , Rfl:   •  ELIQUIS 2.5 MG tablet tablet, Take 2.5 mg by mouth 2 (Two) Times a Day., Disp: , Rfl:   •  fluorometholone (FML) 0.1 % ophthalmic suspension, Administer 1 Drop/kg to both eyes Daily., Disp: , Rfl:   •  levothyroxine (SYNTHROID, LEVOTHROID) 75 MCG tablet, Every Morning Before Breakfast., Disp: , Rfl:   •  metoprolol tartrate (LOPRESSOR) 50 MG tablet, Take 50 mg by mouth 2 (Two) Times a Day., Disp: , Rfl:   •  mirtazapine (REMERON) 7.5 MG tablet, 7.5 mg Daily., Disp: , Rfl:   •  sertraline (ZOLOFT) 25 MG tablet, Take 25 mg by mouth Daily., Disp: , Rfl:   Allergies   Allergen Reactions   • Cephalexin Swelling   • Sulfadiazine Rash   • Trimethoprim Hives   • Trospium Hives       Review of Systems   Constitution: Positive for malaise/fatigue. Negative for fever.   HENT: Negative for congestion and hearing loss.    Eyes: Negative for double vision and visual disturbance.   Cardiovascular:  Negative for chest pain, claudication, dyspnea on exertion, leg swelling and syncope.   Respiratory: Negative for cough and shortness of breath (with exertion).    Endocrine: Negative for cold intolerance.   Skin: Negative for color change and rash.   Musculoskeletal: Negative for arthritis and joint pain.   Gastrointestinal: Negative for abdominal pain and heartburn.   Genitourinary: Negative for hematuria.   Neurological: Negative for excessive daytime sleepiness and dizziness.   Psychiatric/Behavioral: Negative for depression. The patient is not nervous/anxious.    All other systems reviewed and are negative.             Objective:     Physical Exam   Constitutional: She is oriented to person, place, and time. She appears well-developed and well-nourished. She is cooperative.   HENT:   Head: Normocephalic and atraumatic.   Mouth/Throat: Uvula is midline and oropharynx is clear and moist. No oral lesions.   Eyes: Conjunctivae are normal. No scleral icterus.   Neck: Trachea normal. Neck supple. No JVD present. Carotid bruit is not present. No thyromegaly present.   Cardiovascular: Normal rate, regular rhythm, S1 normal, S2 normal, normal heart sounds, intact distal pulses and normal pulses. PMI is not displaced. Exam reveals no gallop and no friction rub.   No murmur heard.  Pulmonary/Chest: Effort normal and breath sounds normal.   Abdominal: Soft. Bowel sounds are normal.   Musculoskeletal: Normal range of motion.   Neurological: She is alert and oriented to person, place, and time. She has normal strength.   No focal deficits   Skin: Skin is warm. No cyanosis.   Psychiatric: She has a normal mood and affect.       Procedures    Lab Review:       Assessment:          Diagnosis Plan   1. Paroxysmal atrial fibrillation (CMS/HCC)     2. Essential hypertension     3. Chronic deep vein thrombosis (DVT) of left lower extremity, unspecified vein (CMS/HCC)            Plan:       Continue anticoagulation mostly staying in  sinus rhythm  Blood pressure is well controlled  Continue anticoagulation stable

## 2019-10-14 PROBLEM — R65.21 SEPTIC SHOCK (HCC): Status: ACTIVE | Noted: 2019-01-01

## 2019-10-14 PROBLEM — A41.9 SEPTIC SHOCK (HCC): Status: ACTIVE | Noted: 2019-01-01

## 2019-10-14 NOTE — ED NOTES
picc team is at bedside attempting to place picc in order for pt to be placed on norepinephrine, daughter in law at bedside consented to the procedure.      Teresa Galeana RN  10/14/19 [id number]

## 2019-10-14 NOTE — ED NOTES
2nd culture was sent prior to antibiotic start, hard stick only able to collect blue culture     Teresa Galeana, RN  10/14/19 [id number]

## 2019-10-14 NOTE — ED NOTES
Daughter in law came to the ED states that she is the only family that pt has and she is the care giver for pt, ED provider spoke to the daughter in law about the pts poor health and poor prognosis, family reports that she has been ill for the past 8 days. Pt is still a&ox4 pt has been given oral care for comfort. BP is borderline hypotensive, pt remains tachycardia. md is aware.      Teresa Galeana, RN  10/14/19 6007

## 2019-10-14 NOTE — ED NOTES
Daughter in law verbalizes that patients wishes it to now be placed on the ventilator. States that she is a DNR     Teresa Galeana RN  10/14/19 [id number]

## 2019-10-14 NOTE — ED PROVIDER NOTES
Subjective   History of Present Illness   History and review of systems limited due to patient's condition  89-year-old female presents not feeling well.  She complains of some pain in left shoulder.  She has been seen by nephrology earlier today was found to be hypotensive with elevated heart rate.  She complained of some abdominal pain and some shortness of breath.  She was noted to have a creatinine of 2.9 and potassium of 5.6.  She was sent here for further evaluation.  Review of Systems  Limited secondary to condition  Past Medical History:   Diagnosis Date   • Ankle wound, right, initial encounter    • Aortic stenosis    • Atrial fibrillation (CMS/HCC)    • Coronary artery disease    • DVT (deep venous thrombosis) (CMS/HCC)    • Hypothyroidism    • Left bundle branch block    • Pulmonary embolus (CMS/HCC)    • Pulmonary hypertension (CMS/HCC)    • Sinus bradycardia    Dementia    Allergies   Allergen Reactions   • Cephalexin Swelling   • Sulfadiazine Rash   • Trimethoprim Hives   • Trospium Hives       Past Surgical History:   Procedure Laterality Date   • COLOSTOMY     • ORIF ANKLE FRACTURE Right 2013    Right Ankle ORIF    • PACEMAKER IMPLANTATION  11/29/2017    Dual Chamber [employer]   • TOTAL ABDOMINAL HYSTERECTOMY         Family History   Problem Relation Age of Onset   • Heart disease Mother    • Diabetes Mother    • Heart disease Sister    • Diabetes Sister        Social History     Socioeconomic History   • Marital status:      Spouse name: Not on file   • Number of children: Not on file   • Years of education: Not on file   • Highest education level: Not on file   Occupational History   • Occupation: Retired   Tobacco Use   • Smoking status: Never Smoker   • Smokeless tobacco: Never Used   Substance and Sexual Activity   • Alcohol use: No     Frequency: Never   • Drug use: No     Prior to Admission medications    Medication Sig Start Date End Date Taking? Authorizing Provider    cholecalciferol (VITAMIN D3) 1000 units tablet Daily. 9/29/17   Aravind Silva MD   Cranberry 600 MG tablet Take 600 mg by mouth 3 (Three) Times a Day.    Aravind Silva MD   ELIQUIS 2.5 MG tablet tablet Take 2.5 mg by mouth 2 (Two) Times a Day. 6/24/19   Aravind Silva MD   fluorometholone (FML) 0.1 % ophthalmic suspension Administer 1 Drop/kg to both eyes Daily. 5/21/19   Aravind Silva MD   levothyroxine (SYNTHROID, LEVOTHROID) 75 MCG tablet Every Morning Before Breakfast. 6/11/19   Aravind Silva MD   loratadine (CLARITIN) 10 MG tablet Take 10 mg by mouth Daily.    Aravind Silva MD   Magnesium 400 MG tablet MAGNESIUM 400 MG TABS 11/2/18   Aravind Silva MD   metoprolol tartrate (LOPRESSOR) 50 MG tablet Take 50 mg by mouth 2 (Two) Times a Day. 6/11/19   Aravind Silva MD   mirtazapine (REMERON) 7.5 MG tablet 7.5 mg Daily. 6/18/19   Aravind Silva MD   polyvinyl alcohol (ARTIFICIAL TEARS) 1.4 % ophthalmic solution Every 12 (Twelve) Hours. 11/2/18   Aravind Silva MD   Prenatal w/o A Vit-Fe Fum-FA (BP MULTINATAL PLUS) 30-1 MG tablet Daily. 12/19/17   Aravind Silva MD   saccharomyces boulardii (FLORASTOR) 250 MG capsule FLORASTOR 250 MG CAPS 6/11/18   Aravind Silva MD   sertraline (ZOLOFT) 25 MG tablet Take 25 mg by mouth Daily. 6/11/19   Aravind Silva MD           Objective   Physical Exam  89 y.o. female Generally chronically ill in appearance.  She is lethargic.  Eyes are rolling.  Pupils equal round react to light.  Extraocular muscles intact, sclera icteric  Oropharynx clear, mucous membranes dry  neck supple no masses  Cardiovascular rapid regular rhythm without murmur appreciated respiratory lungs clear increased basilar breath sounds   abdomen soft without localizing mass rebound or guarding.  She does complain of some tenderness.  She has colostomy noted.  Extremities without tenderness edema  Neurologic without  obvious focal findings noted motor sensory grossly intact extremities she is lethargic  Dermatologic no significant rash or bruising noted    Procedures           ED Course      Results for orders placed or performed during the hospital encounter of 10/14/19   BNP   Result Value Ref Range    BNP 1,784.0 (H) <=100.0 pg/mL   Troponin   Result Value Ref Range    Troponin I 0.340 (C) 0.000 - 0.030 ng/mL   Protime-INR   Result Value Ref Range    Protime 23.1 (H) 9.6 - 11.7 Seconds    INR 2.44 (C) 0.90 - 1.10   aPTT   Result Value Ref Range    PTT 26.2 24.0 - 31.0 seconds   CBC Auto Differential   Result Value Ref Range    WBC 18.80 (H) 3.40 - 10.80 10*3/mm3    RBC 4.29 3.77 - 5.28 10*6/mm3    Hemoglobin 13.1 12.0 - 15.9 g/dL    Hematocrit 44.9 34.0 - 46.6 %    .8 (H) 79.0 - 97.0 fL    MCH 30.7 26.6 - 33.0 pg    MCHC 29.3 (L) 31.5 - 35.7 g/dL    RDW 19.3 (H) 12.3 - 15.4 %    RDW-SD 71.8 (H) 37.0 - 54.0 fl    MPV 9.4 6.0 - 12.0 fL    Platelets 210 140 - 450 10*3/mm3    Neutrophil % 88.0 (H) 42.7 - 76.0 %    Lymphocyte % 3.7 (L) 19.6 - 45.3 %    Monocyte % 8.1 5.0 - 12.0 %    Eosinophil % 0.0 (L) 0.3 - 6.2 %    Basophil % 0.2 0.0 - 1.5 %    Neutrophils, Absolute 16.50 (H) 1.70 - 7.00 10*3/mm3    Lymphocytes, Absolute 0.70 0.70 - 3.10 10*3/mm3    Monocytes, Absolute 1.50 (H) 0.10 - 0.90 10*3/mm3    Eosinophils, Absolute 0.00 0.00 - 0.40 10*3/mm3    Basophils, Absolute 0.00 0.00 - 0.20 10*3/mm3    nRBC 0.5 (H) 0.0 - 0.2 /100 WBC   Magnesium   Result Value Ref Range    Magnesium 3.4 (H) 1.8 - 2.5 mg/dL   Phosphorus   Result Value Ref Range    Phosphorus 6.5 (H) 2.4 - 4.7 mg/dL   Calcium, Ionized   Result Value Ref Range    Ionized Calcium 1.02 (L) 1.20 - 1.30 mmol/L   C-reactive Protein   Result Value Ref Range    C-Reactive Protein 11.81 (H) 0.00 - 0.70 mg/dL   Blood Gas, Arterial   Result Value Ref Range    Site Left Brachial     John's Test N/A     pH, Arterial 7.185 (C) 7.350 - 7.450 pH units    pCO2, Arterial 31.9  (L) 35.0 - 48.0 mm Hg    pO2, Arterial 19.5 (C) 83.0 - 108.0 mm Hg    HCO3, Arterial 12.0 (L) 21.0 - 28.0 mmol/L    Base Excess, Arterial -15.1 (L) 0.0 - 3.0 mmol/L    O2 Saturation, Arterial 21.8 (L) 94.0 - 98.0 %    CO2 Content 13.0 (L) 22 - 29 mmol/L    Barometric Pressure for Blood Gas      Modality Cannula     FIO2 44 %    Hemodilution No    POC Lactate   Result Value Ref Range    Lactate 9.1 (C) 0.5 - 2.0 mmol/L   POC Lactate   Result Value Ref Range    Lactate 10.0 (C) 0.5 - 2.0 mmol/L   POC Lactate   Result Value Ref Range    Lactate 9.2 (C) 0.5 - 2.0 mmol/L   POC Lactate   Result Value Ref Range    Lactate 9.6 (C) 0.5 - 2.0 mmol/L   Light Blue Top   Result Value Ref Range    Extra Tube hold for add-on    Green Top (Gel)   Result Value Ref Range    Extra Tube Hold for add-ons.    Gold Top - SST   Result Value Ref Range    Extra Tube Hold for add-ons.      Ct Abdomen Pelvis Without Contrast    Result Date: 10/14/2019   1. Subtotal colectomy. 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 The subcutaneous portion of the ileal loop is slightly redundant however there is no small bowel distention to suggest obstruction. There are some scattered small bowel air-fluid levels in nondistended loops which could reflect an associated mild ileus or enteritis. 2. Moderate to large bilateral pleural effusions and dependent bibasilar atelectasis. In addition there is a mild amount of ascites deep in the pelvis and there is diffuse increased soft tissue stranding in the subcutaneous fat and mesenteric fat. The constellation of these findings does raise concern for changes of congestive heart failure. 3. Incidental note is made of a 3 cm right lateral hernia containing mesenteric fat.  Electronically Signed By-Ulysses Carpenter On:10/14/2019 7:16 PM This report was finalized on [id number] by  Ulysses Carpenter, .    Xr Chest 1 View    Result Date: 10/14/2019  Under 1. PICC line in good position with the tip in the  "superior vena cava. 2. Findings suggesting changes of pulmonary edema and congestive failure which are worsened from the previous study.  Electronically Signed By-Ulysses Carpenter On:10/14/2019 7:03 PM This report was finalized on [id number] by  Ulysses Carpenter, .    Xr Chest 1 View    Result Date: 10/14/2019  Renomegaly. Basilar infiltrates and effusions. The appearance is somewhat more suggestive of changes of pulmonary edema and congestive failure. An underlying pneumonia cannot be excluded.  Electronically Signed By-Ulysses Carpenter On:10/14/2019 5:14 PM This report was finalized on [id number] by  Ulysses Carpenter, .    Medications   sodium chloride 0.9 % infusion (75 mL/hr Intravenous Not Given 10/14/19 1739)   sodium chloride 0.9 % flush 10 mL (not administered)   amiodarone (CORDARONE) 150 MG/3ML injection  - ADS Override Pull (  Not Given 10/14/19 1922)   diltiaZEM (CARDIZEM) 125mg/125 mL infusion (5 mg/hr Intravenous Currently Infusing 10/14/19 1700)   norepinephrine (LEVOPHED) 8 mg/250 mL (32 mcg/mL) in sodium chloride 0.9% infusion (premix) (not administered)   dilTIAZem HCl-Sodium Chloride (CARDIZEM) 125-0.9 MG/125ML-% infusion solution  - ADS Override Pull (125 mg  Given 10/14/19 1610)   sodium chloride 0.9 % bolus 500 mL (0 mL Intravenous Stopped 10/14/19 1653)   dilTIAZem (CARDIZEM) injection 10 mg (10 mg Intravenous Given 10/14/19 1615)   meropenem (MERREM) 1 g in sodium chloride 0.9 % 100 mL IVPB (0 g Intravenous Stopped 10/14/19 1734)   sodium chloride 0.9 % bolus 1,449 mL (0 mL/kg × 48.3 kg Intravenous Stopped 10/14/19 1800)   digoxin (LANOXIN) injection 250 mcg ( Intravenous Given 10/14/19 1739)   amiodarone (CORDARONE) 150 mg in sodium chloride 0.9 % (150 mg Intravenous Currently Infusing 10/14/19 1807)     BP (!) 73/40   Pulse (!) 126   Temp 97.8 °F (36.6 °C) (Oral)   Resp (!) 29   Ht 160 cm (63\")   Wt 48.3 kg (106 lb 7.7 oz)   SpO2 (!) 87%   BMI 18.86 kg/m²                MDM  Number " of Diagnoses or Management Options   Paroxysmal atrial fibrillation (CMS/[facility]):    Septic shock (CMS/[facility]):   Critical Care  Total time providing critical care: 30-74 minutes    Chart review: Patient had admission for atrial fibrillation And UTI February this year  Comorbidity: As per past history  Differential: Atrial fibrillation, SVT, MI, V. tach, severe sepsis, jaundice, UTI  My EKG interpretation: EKG #1 wide-complex tachycardia.  EKG #2 atrial fibrillation with decreased ventricular response  Lab: Troponin elevated 0.34, INR 2.44 BNP elevated 1784.  Lactic acid elevated 9.2, white count 18.8 with hemoglobin 13.1 platelet count 210 with 88 segs.  Arterial blood gas revealed pH 7.18 PCO2 31 PO2 19.  This was repeated.  Respiratory therapist was never confident that it was arterial gas however she is noted to be acidotic.  CRP 11.81,  Radiology: I reviewed x-rays.  Chest x-ray reveals cardia megaly with bibasilar infiltrates and effusion suggestive of edema.  Underlying pneumonia cannot be excluded.  A repeat chest x-ray after PICC line placement reveals some mild worsening of her congestive heart failure.  CT abdomen pelvis without contrast reveals bilateral pleural effusions ascitic fluid in pelvis.  Some diffuse increased soft tissue stranding in the subcutaneous and mesenteric fat.  Constellation of these symptoms raise concern for congestive heart failure.  There is incidental note of a 3 cm right lateral hernia containing only mesenteric fat  Discussion/treatment: Patient was given Cardizem 10 mg IV.  She developed hypotension following this but did have improvement in her rate.  She was given fluid bolus.  She received a total of 30 cc/kg fluid bolus.  She was noted to have improvement in her pressure.  She was then placed on Cardizem 5 mg/h drip.  She was given digoxin and amiodarone.  She was given Merrem as she has had history of ESBL UTIs.  Patient had PICC line placed as she did have somewhat labile  blood pressure and was concerned that she was going to require Levophed.  Patient's labs have been delayed due to lab not running them and catheterized urinalysis was not initially obtained.  Findings were discussed with family.  Advised the critical nature of her illness.  She is noted to have documented DNR status.  Patient was discussed with the intensivist.  They were made aware that the comprehensive metabolic panel has been pending now for approximately 3hours.  Urinalysis is also pending.  Finally was able to obtain catheterized urine when Rodríguez catheter was placed at 1920.  Findings were discussed with patient's daughter-in-law.  Her prognosis is very guarded with her age and multiple comorbidities.  She was advised of this.    SEPTIC SHOCK FOCUSED EXAM ATTESTATION    I attest that I have reassessed tissue perfusion after the fluid bolus given.  Patient was noted to have improvement in mental status and perfusion with treatment.  She was noted to appear to have some worsening of her failure though on chest x-ray.  Patient did develop further hypotension and was started on levophed.  She did have improvement in her heart rate though to low 100 range.  Jossue Donovan MD  10/14/19  7:27 PM    Final diagnoses:   Septic shock (CMS/[facility])   Paroxysmal atrial fibrillation (CMS/HCC)   Acute congestive heart failure, unspecified heart failure type (CMS/[facility])   Non-ST elevation myocardial infarction (NSTEMI) (CMS/[facility])   Acute kidney injury (LEYLA) with acute tubular necrosis (ATN) (CMS/[facility])             Josseu Donovan MD  10/14/19 1936

## 2019-10-14 NOTE — CONSULTS
PICC Team Consult:  Successful placement of triple lumen 5fr picc line at bedside by Radha Morales RN. Patient tolerated procedure well. Pre-existing midline discontinued prior to picc insertion. 18cm length, tip intact. STAT portable chest x-ray ordered to verify picc tip location. Primary RN notified.

## 2019-10-14 NOTE — PROGRESS NOTES
Nephrology  Progress Note                                        [facility]    Patient Identification    Name: Dinora Vásquez  Age: 89 y.o.  Sex: female  :  1926  MRN: [id number]      DATE OF SERVICE:  10/14/2019        Subective    Patient is seen today in the outpatient setting  Not feeling good  Short of breath  Hurting in her belly  Blood pressure was found to be 72/42 with a heart rate 140  Pupils equally reactive and equal  No JVD  Chest decreased breath sounds bilaterally  Heart sounds tachycardic  Abdomen soft and lax colostomy in place  Labs reviewed potassium 5.6 creatinine 2.9  Assessment and plan  Acute kidney injury  Hyperkalemia  Metabolic acidosis  Hypotension  Tachycardia      Start a bolus of IV fluid  Send to the emergency room for further evaluation  Patient probably has an underlying sepsis may have an intra-abdominal process  Discussed with the patient and the staff

## 2019-10-15 PROBLEM — R77.8 ELEVATED TROPONIN: Status: ACTIVE | Noted: 2019-01-01

## 2019-10-15 PROBLEM — I48.91 ATRIAL FIBRILLATION WITH RVR (HCC): Status: ACTIVE | Noted: 2019-01-01

## 2019-10-15 PROBLEM — R79.89 ELEVATED BRAIN NATRIURETIC PEPTIDE (BNP) LEVEL: Status: ACTIVE | Noted: 2019-01-01

## 2019-10-15 PROBLEM — N18.9 ACUTE ON CHRONIC RENAL FAILURE (HCC): Status: ACTIVE | Noted: 2019-01-01

## 2019-10-15 PROBLEM — N17.9 ACUTE ON CHRONIC RENAL FAILURE (HCC): Status: ACTIVE | Noted: 2019-01-01

## 2019-10-15 NOTE — PROGRESS NOTES
"Pharmacy Dosing Service  Antibiotics  Vancomycin  Meropenem    Subjective:  Dinora Vásquez is a 89 y.o.female admitted with sepsis from UTI with acute respiratory failure. Patient has recent history of ESBL UTI in 2/19. Pharmacy to dose vancomycin for empiric therapy.      Assessment/Plan  1. Day #1 Vancomycin: Pulse dosing patient given LEYLA. Patient received 1000 mg (17 mg/kg ABW) IV x1 dose on admission. Random ~11 hours after dose was 16.5 mcg/mL. Will give 750 mg (13 mg/kg ABW) IV x1 dose today and obtain random with AM labs 10/16. Plan to re-dose when level expected to be < 20 mcg/mL.     2. Day #2 Meropenem: 500 mg IV q24h for CrCl < 10 ml/min.    Continue to monitor drug levels, renal function, culture and sensitivities, and patient clinical status.       Objective:  Relevant clinical data and objective history reviewed:  160 cm (63\")   57.7 kg (127 lb 3.3 oz)   Ideal body weight: 52.4 kg (115 lb 8.3 oz)  Adjusted ideal body weight: 54.5 kg (120 lb 3.1 oz)  Body mass index is 22.53 kg/m².    Results from last 7 days   Lab Units 10/15/19  1121   VANCOMYCIN RM mcg/mL 16.50     Results from last 7 days   Lab Units 10/15/19  0424 10/15/19  0029 10/14/19  1647   CREATININE mg/dL 3.50* 3.70* 3.80*     Estimated Creatinine Clearance: 9.1 mL/min (A) (by C-G formula based on SCr of 3.5 mg/dL (H)).  I/O last 3 completed shifts:  In: 3743 [I.V.:2294; IV Piggyback:1449]  Out: 300 [Urine:200; Stool:100]    WBC   Date Value Ref Range Status   10/15/2019 24.90 (H) 3.40 - 10.80 10*3/mm3 Final   10/15/2019 21.70 (H) 3.40 - 10.80 10*3/mm3 Final   10/14/2019 18.80 (H) 3.40 - 10.80 10*3/mm3 Final     Temperature    10/15/19 0100 10/15/19 0500 10/15/19 0800   Temp: 97.5 °F (36.4 °C) 97.7 °F (36.5 °C) 98 °F (36.7 °C)     Baseline culture/source/susceptibility:  Microbiology Results (last 10 days)       Procedure Component Value - Date/Time    Respiratory Panel, PCR - Swab, Nasopharynx [[id number]]  (Normal) Collected:  " 10/14/19 1951    Lab Status:  Final result Specimen:  Swab from Nasopharynx Updated:  10/14/19 2236     ADENOVIRUS, PCR Not Detected     Coronavirus 229E Not Detected     Coronavirus HKU1 Not Detected     Coronavirus NL63 Not Detected     Coronavirus OC43 Not Detected     Human Metapneumovirus Not Detected     Human Rhinovirus/Enterovirus Not Detected     Influenza B PCR Not Detected     Parainfluenza Virus 1 Not Detected     Parainfluenza Virus 2 Not Detected     Parainfluenza Virus 3 Not Detected     Parainfluenza Virus 4 Not Detected     Bordetella pertussis pcr Not Detected     Influenza A H1 2009 PCR Not Detected     Chlamydophila pneumoniae PCR Not Detected     Mycoplasma pneumo by PCR Not Detected     Influenza A PCR Not Detected     Influenza A H3 Not Detected     Influenza A H1 Not Detected     RSV, PCR Not Detected    S. Pneumo Ag Urine or CSF - Urine, Urine, Catheter [[id number]]  (Normal) Collected:  10/14/19 1929    Lab Status:  Final result Specimen:  Urine, Catheter Updated:  10/15/19 0831     Strep Pneumo Ag Negative    Legionella Antigen, Urine - Urine, Urine, Catheter [[id number]]  (Normal) Collected:  10/14/19 1929    Lab Status:  Final result Specimen:  Urine, Catheter Updated:  10/15/19 0830     LEGIONELLA ANTIGEN, URINE Negative             Anti-Infectives (From admission, onward)      Ordered     Dose/Rate Route Frequency Start Stop    10/14/19 2106  meropenem (MERREM) 500 mg in sodium chloride 0.9 % 100 mL IVPB     Ordering Provider:  Jim Renae APRN    500 mg  33.3 mL/hr over 180 Minutes Intravenous Every 24 Hours 10/15/19 1700 10/22/19 1659    10/15/19 0022  vancomycin 1000 mg/250 mL 0.9% NS IVPB ([facility])     Ordering Provider:  Jim Renae APRN    20 mg/kg × 53.7 kg  over 60 Minutes Intravenous Once 10/15/19 0115 10/15/19 0130    10/15/19 0022  vancomycin 750 mg in sodium chloride 0.9 % 100 mL IVPB     Ordering Provider:  Jim Renae APRN    15 mg/kg × 53.7 kg Intravenous As  Needed 10/15/19 0021 10/22/19 0020    10/15/19 0018  Pharmacy to dose vancomycin     Ordering Provider:  Jim Renae APRN     Does not apply Continuous PRN 10/15/19 0018 10/22/19 0017    10/14/19 2105  Pharmacy to Dose meropenem (MERREM)     Ordering Provider:  Jim Renae APRN     Does not apply Continuous PRN 10/14/19 2104 10/21/19 2103    10/14/19 1649  meropenem (MERREM) 1 g in sodium chloride 0.9 % 100 mL IVPB     Ordering Provider:  Jossue Donovan MD    1 g  200 mL/hr over 30 Minutes Intravenous Once 10/14/19 1651 10/14/19 1734           Catalina Dinh PharmD  10/15/19 1:13 PM

## 2019-10-15 NOTE — PLAN OF CARE
Problem: Fall Risk (Adult)  Goal: Identify Related Risk Factors and Signs and Symptoms  Outcome: Ongoing (interventions implemented as appropriate)   10/15/19 0637   Fall Risk (Adult)   Related Risk Factors (Fall Risk) age-related changes;sleep pattern alteration;homeostatic imbalance;slippery/uneven surfaces;environment unfamiliar   Signs and Symptoms (Fall Risk) presence of risk factors     Goal: Absence of Fall  Outcome: Ongoing (interventions implemented as appropriate)   10/15/19 0637   Fall Risk (Adult)   Absence of Fall making progress toward outcome       Problem: Patient Care Overview  Goal: Plan of Care Review  Outcome: Ongoing (interventions implemented as appropriate)   10/15/19 0637   Coping/Psychosocial   Plan of Care Reviewed With patient;family   Plan of Care Review   Progress declining   OTHER   Outcome Summary Pt presented to ED with septic shock. Was hypotensive and in afib w/RVR. On morales and amio gtt. Kidney function altered with significantly decreased UOP, renal consulted. Lactic was 13.2 upon arrival to ICU. Pt got 2L fluid bolus in ED. Started on vancomycin. Will continue to monitor.       Problem: Skin Injury Risk (Adult)  Goal: Identify Related Risk Factors and Signs and Symptoms  Outcome: Ongoing (interventions implemented as appropriate)   10/15/19 0637   Skin Injury Risk (Adult)   Related Risk Factors (Skin Injury Risk) advanced age;critical care admission;infection;hospitalization prolonged;mechanical forces;medical devices;mobility impaired;medication;nutritional deficiencies;tissue perfusion altered     Goal: Skin Health and Integrity  Outcome: Ongoing (interventions implemented as appropriate)   10/15/19 0637   Skin Injury Risk (Adult)   Skin Health and Integrity making progress toward outcome       Problem: Sepsis/Septic Shock (Adult)  Goal: Signs and Symptoms of Listed Potential Problems Will be Absent, Minimized or Managed (Sepsis/Septic Shock)  Outcome: Ongoing (interventions  implemented as appropriate)

## 2019-10-15 NOTE — PROGRESS NOTES
"Pharmacy Dosing Service  Antibiotic  Vancomycin    SC is a 89 y.o.female admitted with sepsis. Pharmacy to dose vancomycin.    Assessment/Plan  1. Day #1 vancomycin: 1gm x1. Obtain random with AM labs. Will re-dose when expected level <20mcg/ml.    2. Day #2 Meropenem: 1gm x1 given 10/14.  Continue with 500mg IV q24h for CrCl CrCl < 10 ml/min.     3. Will continue to monitor renal function, cultures and sensitivities, and patient clinical status.      Relevant clinical data and objective history reviewed:  160 cm (63\")   53.7 kg (118 lb 6.2 oz)   Ideal body weight: 52.4 kg (115 lb 8.3 oz)  Adjusted ideal body weight: 52.9 kg (116 lb 10.7 oz)  Body mass index is 20.97 kg/m².    Creatinine   Date Value Ref Range Status   10/14/2019 3.80 (H) 0.40 - 1.00 mg/dL Final   10/14/2019 4.00 (H) 0.40 - 1.00 mg/dL Final   02/18/2019 0.9 0.4 - 1.0 mg/dl Final   02/17/2019 0.8 0.4 - 1.0 mg/dl Final   02/16/2019 1.0 0.4 - 1.0 mg/dl Final     Estimated Creatinine Clearance: 7.8 mL/min (A) (by C-G formula based on SCr of 3.8 mg/dL (H)).  I/O last 3 completed shifts:  In: 1449 [IV Piggyback:1449]  Out: -           WBC   Date Value Ref Range Status   10/14/2019 18.80 (H) 3.40 - 10.80 10*3/mm3 Final   02/18/2019 11.8 (H) 4.5 - 11.5 10*3/uL Final   02/17/2019 9.3 4.5 - 11.5 10*3/uL Final   02/16/2019 10.1 4.5 - 11.5 10*3/uL Final     Temperature    10/14/19 1552   Temp: 97.8 °F (36.6 °C)     Baseline culture/source/susceptibility:  Microbiology Results (last 10 days)       Procedure Component Value - Date/Time    Respiratory Panel, PCR - Swab, Nasopharynx [[id number]]  (Normal) Collected:  10/14/19 1951    Lab Status:  Final result Specimen:  Swab from Nasopharynx Updated:  10/14/19 2236     ADENOVIRUS, PCR Not Detected     Coronavirus 229E Not Detected     Coronavirus HKU1 Not Detected     Coronavirus NL63 Not Detected     Coronavirus OC43 Not Detected     Human Metapneumovirus Not Detected     Human Rhinovirus/Enterovirus Not Detected "     Influenza B PCR Not Detected     Parainfluenza Virus 1 Not Detected     Parainfluenza Virus 2 Not Detected     Parainfluenza Virus 3 Not Detected     Parainfluenza Virus 4 Not Detected     Bordetella pertussis pcr Not Detected     Influenza A H1 2009 PCR Not Detected     Chlamydophila pneumoniae PCR Not Detected     Mycoplasma pneumo by PCR Not Detected     Influenza A PCR Not Detected     Influenza A H3 Not Detected     Influenza A H1 Not Detected     RSV, PCR Not Detected             Anti-Infectives (From admission, onward)      Ordered     Dose/Rate Route Frequency Start Stop    10/14/19 2106  meropenem (MERREM) 500 mg in sodium chloride 0.9 % 100 mL IVPB     Ordering Provider:  Jim Renae APRN    500 mg  33.3 mL/hr over 180 Minutes Intravenous Every 24 Hours 10/15/19 1700 10/22/19 1659    10/15/19 0022  vancomycin 1000 mg/250 mL 0.9% NS IVPB ([facility])     Ordering Provider:  Jim Renae APRN    20 mg/kg × 53.7 kg  over 60 Minutes Intravenous Once 10/15/19 0115      10/15/19 0022  vancomycin 750 mg in sodium chloride 0.9 % 100 mL IVPB     Ordering Provider:  Jim Renae APRN    15 mg/kg × 53.7 kg Intravenous As Needed 10/15/19 0021 10/22/19 0020    10/15/19 0018  Pharmacy to dose vancomycin     Ordering Provider:  Jim Renae APRN     Does not apply Continuous PRN 10/15/19 0018 10/22/19 0017    10/14/19 2105  Pharmacy to Dose meropenem (MERREM)     Ordering Provider:  Jim Renae APRN     Does not apply Continuous PRN 10/14/19 2104 10/21/19 2103    10/14/19 1649  meropenem (MERREM) 1 g in sodium chloride 0.9 % 100 mL IVPB     Ordering Provider:  Jossue Donovan MD    1 g  200 mL/hr over 30 Minutes Intravenous Once 10/14/19 1651 10/14/19 1734             Bob Alvarez PharmD  10/15/19 12:25 AM

## 2019-10-15 NOTE — CONSULTS
Nephrology Consult Note                                                [facility]      Patient Identification:  Name: Dinora Vásquez  Age: 89 y.o.  Sex: female  :  1926  MRN: [id number]               Requesting Physician: Amanda Kaba MD  Reason for Consultation: management recommendations      History of Present Illness:    Patient is a 89-year-old white female known to me from visit yesterday at outpatient where she was found to be hypotensive in the 70s systolic and tachycardia 140s into the emergency room and being consulted on her to manage acute kidney injury with metabolic acidosis patient is now on pressors amiodarone drip she is awake on oxygen nasal cannula  She was found to be in A. fib with rapid ventricular response UTI septic shock congestive heart failure with an elevated troponin with NSTEMI  Been asked to manage acute kidney injury with metabolic acidosis  Problem List:  Patient Active Problem List   Diagnosis   • Septic shock (CMS/HCC)     Past Medical History:  Past Medical History:   Diagnosis Date   • Ankle wound, right, initial encounter    • Aortic stenosis    • Atrial fibrillation (CMS/HCC)    • Coronary artery disease    • DVT (deep venous thrombosis) (CMS/HCC)    • Hypothyroidism    • Left bundle branch block    • Pulmonary embolus (CMS/HCC)    • Pulmonary hypertension (CMS/HCC)    • Sinus bradycardia      Past Surgical History:  Past Surgical History:   Procedure Laterality Date   • COLOSTOMY     • ORIF ANKLE FRACTURE Right     Right Ankle ORIF    • PACEMAKER IMPLANTATION  2017    Dual Chamber [employer]   • TOTAL ABDOMINAL HYSTERECTOMY        Home Meds:  Medications Prior to Admission   Medication Sig Dispense Refill Last Dose   • cholecalciferol (VITAMIN D3) 1000 units tablet Daily.      • Cranberry 600 MG tablet Take 600 mg by mouth 3 (Three) Times a Day.      • ELIQUIS 2.5 MG tablet tablet Take 2.5 mg by mouth 2 (Two)  Times a Day.      • fluorometholone (FML) 0.1 % ophthalmic suspension Administer 1 Drop/kg to both eyes Daily.      • levothyroxine (SYNTHROID, LEVOTHROID) 75 MCG tablet Every Morning Before Breakfast.      • loratadine (CLARITIN) 10 MG tablet Take 10 mg by mouth Daily.      • Magnesium 400 MG tablet MAGNESIUM 400 MG TABS      • metoprolol tartrate (LOPRESSOR) 50 MG tablet Take 50 mg by mouth 2 (Two) Times a Day.      • mirtazapine (REMERON) 7.5 MG tablet 7.5 mg Daily.      • polyvinyl alcohol (ARTIFICIAL TEARS) 1.4 % ophthalmic solution Every 12 (Twelve) Hours.      • Prenatal w/o A Vit-Fe Fum-FA (BP MULTINATAL PLUS) 30-1 MG tablet Daily.      • saccharomyces boulardii (FLORASTOR) 250 MG capsule FLORASTOR 250 MG CAPS      • sertraline (ZOLOFT) 25 MG tablet Take 25 mg by mouth Daily.        Current Meds:     Current Facility-Administered Medications:   •  [COMPLETED] amiodarone in dextrose 5% (NEXTERONE) loading dose 150mg/100mL, 150 mg, Intravenous, Once, Last Rate: 600 mL/hr at 10/14/19 2125, 150 mg at 10/14/19 2125 **FOLLOWED BY** [] AMIODARONE HCL IN DEXTROSE 450-5 MG/250ML-% IV SOLN, 1 mg/min, Intravenous, Continuous, Last Rate: 33.3 mL/hr at 10/14/19 2147, 1 mg/min at 10/14/19 2147 **FOLLOWED BY** AMIODARONE HCL IN DEXTROSE 450-5 MG/250ML-% IV SOLN, 0.5 mg/min, Intravenous, Continuous, Jim Renae APRN, Last Rate: 16.67 mL/hr at 10/15/19 0559, 0.5 mg/min at 10/15/19 0559  •  calcium gluconate 2 g/100 mL NS IVPB, 2 g, Intravenous, Once, Haydee Meier APRN  •  dextrose (D50W) 25 g/ 50mL Intravenous Solution 25 g, 25 g, Intravenous, Q15 Min PRN, Jim Renae APRN  •  dextrose (GLUTOSE) oral gel 15 g, 15 g, Oral, Q15 Min PRN, Jim Renae APRN  •  famotidine (PEPCID) tablet 20 mg, 20 mg, Oral, Q AM, Jim Renae APRN, 20 mg at 10/15/19 0559  •  glucagon (human recombinant) (GLUCAGEN DIAGNOSTIC) injection 1 mg, 1 mg, Subcutaneous, Q15 Min PRN, Jim Renae APRN  •  insulin lispro  (humaLOG) injection 0-7 Units, 0-7 Units, Subcutaneous, Q6H, Jim Renae APRN, 2 Units at 10/15/19 0559  •  meropenem (MERREM) 500 mg in sodium chloride 0.9 % 100 mL IVPB, 500 mg, Intravenous, Q24H, Jim Renae APRN  •  ondansetron (ZOFRAN) injection 4 mg, 4 mg, Intravenous, Q6H PRN, Jim Renae APRN, 4 mg at 10/15/19 0037  •  Pharmacy to Dose meropenem (MERREM), , Does not apply, Continuous PRN, Jim Renae APRN  •  Pharmacy to dose vancomycin, , Does not apply, Continuous PRN, Jim Renae APRN  •  phenylephrine (AREN-SYNEPHRINE) 50 mg/250 mL (0.2 mg/mL) in 0.9% NS  infusion, 0.5-3 mcg/kg/min, Intravenous, Titrated, Jim Renae APRN, Last Rate: 29 mL/hr at 10/15/19 0559, 2 mcg/kg/min at 10/15/19 0559  •  sodium bicarbonate 8.4 % 150 mEq in dextrose (D5W) 5 % 1,000 mL infusion (greater than 75 mEq), 150 mEq, Intravenous, Continuous, Jim Renae APRN, Last Rate: 75 mL/hr at 10/15/19 0030, 150 mEq at 10/15/19 0030  •  sodium chloride 0.9 % flush 10 mL, 10 mL, Intravenous, PRN, Jossue Donovan MD  •  sodium chloride 0.9 % flush 10 mL, 10 mL, Intravenous, Q12H, Jim Renae APRN, 10 mL at 10/15/19 0803  •  sodium chloride 0.9 % flush 10 mL, 10 mL, Intravenous, PRN, Jim Renae APRN  •  sodium chloride 0.9 % infusion, 75 mL/hr, Intravenous, Continuous, Jossue Donovan MD, Last Rate: 75 mL/hr at 10/14/19 2045, 75 mL/hr at 10/14/19 2045  •  vancomycin 750 mg in sodium chloride 0.9 % 100 mL IVPB, 15 mg/kg, Intravenous, PRN, Jim Renae APRN    Allergies:  Allergies   Allergen Reactions   • Cephalexin Swelling   • Sulfadiazine Rash   • Trimethoprim Hives   • Trospium Hives     Social History:   Social History     Tobacco Use   • Smoking status: Never Smoker   • Smokeless tobacco: Never Used   Substance Use Topics   • Alcohol use: No     Frequency: Never      Family History:  Family History   Problem Relation Age of Onset   • Heart disease Mother    • Diabetes Mother    •  "Heart disease Sister    • Diabetes Sister         Review of Systems  Unobtainable    Objective:  Vitals:   /55   Pulse 111   Temp 98 °F (36.7 °C) (Oral)   Resp (!) 29   Ht 160 cm (63\")   Wt 57.7 kg (127 lb 3.3 oz)   SpO2 100%   BMI 22.53 kg/m²   I/O:     Intake/Output Summary (Last 24 hours) at 10/15/2019 0930  Last data filed at 10/15/2019 0600  Gross per 24 hour   Intake 3743 ml   Output 300 ml   Net 3443 ml       Exam:  General Appearance: Awake on Sulaiman-Synephrine and amiodarone and oxygen  Head:  Normocephalic, without obvious abnormality, atraumatic  Eyes:  PERRL, conjunctiva/corneas clear     Neck:  Supple,  no adenopathy;      Lungs:  Decreased BS occasion ronchi  Heart:  Regular rate and rhythm, S1 and S2 normal  Abdomen: Colostomy in place  Extremities: trace edema  Pulses: 2+ and symmetric all extremities  Skin:  No rashes or lesions  Data Review:  All labs (24hrs):   Recent Results (from the past 24 hour(s))   Comprehensive Metabolic Panel    Collection Time: 10/14/19  4:19 PM   Result Value Ref Range    Glucose 152 (H) 65 - 99 mg/dL    BUN 71 (H) 8 - 20 mg/dL    Creatinine 4.00 (H) 0.40 - 1.00 mg/dL    Sodium 142 136 - 144 mmol/L    Potassium 5.2 (H) 3.6 - 5.1 mmol/L    Chloride 103 101 - 111 mmol/L    CO2 12.0 (L) 22.0 - 32.0 mmol/L    Calcium 8.0 (L) 8.9 - 10.3 mg/dL    Total Protein 6.4 6.1 - 7.9 g/dL    Albumin 3.50 3.50 - 4.80 g/dL    ALT (SGPT) <5 (L) 14 - 54 U/L    AST (SGOT) 51 (H) 15 - 41 U/L    Alkaline Phosphatase 97 (H) 32 - 91 U/L    Total Bilirubin 2.1 (H) 0.3 - 1.2 mg/dL    eGFR Non African Amer 10 (L) >60 mL/min/1.73    eGFR  African Amer      Globulin 2.9 2.5 - 3.8 gm/dL    A/G Ratio 1.2 1.0 - 1.7 g/dL    BUN/Creatinine Ratio 17.8 5.4 - 26.2    Anion Gap 32.2 (C) 5.0 - 15.0 mmol/L   BNP    Collection Time: 10/14/19  4:19 PM   Result Value Ref Range    BNP 1,784.0 (H) <=100.0 pg/mL   Protime-INR    Collection Time: 10/14/19  4:19 PM   Result Value Ref Range    Protime 23.1 (H) " 9.6 - 11.7 Seconds    INR 2.44 (C) 0.90 - 1.10   aPTT    Collection Time: 10/14/19  4:19 PM   Result Value Ref Range    PTT 26.2 24.0 - 31.0 seconds   Magnesium    Collection Time: 10/14/19  4:19 PM   Result Value Ref Range    Magnesium 3.4 (H) 1.8 - 2.5 mg/dL   Phosphorus    Collection Time: 10/14/19  4:19 PM   Result Value Ref Range    Phosphorus 6.5 (H) 2.4 - 4.7 mg/dL   C-reactive Protein    Collection Time: 10/14/19  4:19 PM   Result Value Ref Range    C-Reactive Protein 11.81 (H) 0.00 - 0.70 mg/dL   Light Blue Top    Collection Time: 10/14/19  4:19 PM   Result Value Ref Range    Extra Tube hold for add-on    Green Top (Gel)    Collection Time: 10/14/19  4:19 PM   Result Value Ref Range    Extra Tube Hold for add-ons.    Troponin    Collection Time: 10/14/19  4:20 PM   Result Value Ref Range    Troponin I 0.340 (C) 0.000 - 0.030 ng/mL   CBC Auto Differential    Collection Time: 10/14/19  4:20 PM   Result Value Ref Range    WBC 18.80 (H) 3.40 - 10.80 10*3/mm3    RBC 4.29 3.77 - 5.28 10*6/mm3    Hemoglobin 13.1 12.0 - 15.9 g/dL    Hematocrit 44.9 34.0 - 46.6 %    .8 (H) 79.0 - 97.0 fL    MCH 30.7 26.6 - 33.0 pg    MCHC 29.3 (L) 31.5 - 35.7 g/dL    RDW 19.3 (H) 12.3 - 15.4 %    RDW-SD 71.8 (H) 37.0 - 54.0 fl    MPV 9.4 6.0 - 12.0 fL    Platelets 210 140 - 450 10*3/mm3    Neutrophil % 88.0 (H) 42.7 - 76.0 %    Lymphocyte % 3.7 (L) 19.6 - 45.3 %    Monocyte % 8.1 5.0 - 12.0 %    Eosinophil % 0.0 (L) 0.3 - 6.2 %    Basophil % 0.2 0.0 - 1.5 %    Neutrophils, Absolute 16.50 (H) 1.70 - 7.00 10*3/mm3    Lymphocytes, Absolute 0.70 0.70 - 3.10 10*3/mm3    Monocytes, Absolute 1.50 (H) 0.10 - 0.90 10*3/mm3    Eosinophils, Absolute 0.00 0.00 - 0.40 10*3/mm3    Basophils, Absolute 0.00 0.00 - 0.20 10*3/mm3    nRBC 0.5 (H) 0.0 - 0.2 /100 WBC   Blood Gas, Arterial    Collection Time: 10/14/19  4:46 PM   Result Value Ref Range    Site Left Brachial     John's Test N/A     pH, Arterial 7.185 (C) 7.350 - 7.450 pH units     pCO2, Arterial 31.9 (L) 35.0 - 48.0 mm Hg    pO2, Arterial 19.5 (C) 83.0 - 108.0 mm Hg    HCO3, Arterial 12.0 (L) 21.0 - 28.0 mmol/L    Base Excess, Arterial -15.1 (L) 0.0 - 3.0 mmol/L    O2 Saturation, Arterial 21.8 (L) 94.0 - 98.0 %    CO2 Content 13.0 (L) 22 - 29 mmol/L    Barometric Pressure for Blood Gas      Modality Cannula     FIO2 44 %    Hemodilution No    Calcium, Ionized    Collection Time: 10/14/19  4:47 PM   Result Value Ref Range    Ionized Calcium 1.02 (L) 1.20 - 1.30 mmol/L   Gold Top - SST    Collection Time: 10/14/19  4:47 PM   Result Value Ref Range    Extra Tube Hold for add-ons.    Comprehensive Metabolic Panel    Collection Time: 10/14/19  4:47 PM   Result Value Ref Range    Glucose 142 (H) 65 - 99 mg/dL    BUN 71 (H) 8 - 20 mg/dL    Creatinine 3.80 (H) 0.40 - 1.00 mg/dL    Sodium 144 136 - 144 mmol/L    Potassium 5.2 (H) 3.6 - 5.1 mmol/L    Chloride 102 101 - 111 mmol/L    CO2 14.0 (L) 22.0 - 32.0 mmol/L    Calcium 8.2 (L) 8.9 - 10.3 mg/dL    Total Protein 6.2 6.1 - 7.9 g/dL    Albumin 3.50 3.50 - 4.80 g/dL    ALT (SGPT) <5 (L) 14 - 54 U/L    AST (SGOT) 51 (H) 15 - 41 U/L    Alkaline Phosphatase 97 (H) 32 - 91 U/L    Total Bilirubin 2.3 (H) 0.3 - 1.2 mg/dL    eGFR Non African Amer 11 (L) >60 mL/min/1.73    eGFR  African Amer      Globulin 2.7 2.5 - 3.8 gm/dL    A/G Ratio 1.3 1.0 - 1.7 g/dL    BUN/Creatinine Ratio 18.7 5.4 - 26.2    Anion Gap 33.2 (C) 5.0 - 15.0 mmol/L   POC Lactate    Collection Time: 10/14/19  4:51 PM   Result Value Ref Range    Lactate 9.1 (C) 0.5 - 2.0 mmol/L   POC Lactate    Collection Time: 10/14/19  4:53 PM   Result Value Ref Range    Lactate 10.0 (C) 0.5 - 2.0 mmol/L   POC Lactate    Collection Time: 10/14/19  4:54 PM   Result Value Ref Range    Lactate 9.2 (C) 0.5 - 2.0 mmol/L   POC Lactate    Collection Time: 10/14/19  7:10 PM   Result Value Ref Range    Lactate 9.6 (C) 0.5 - 2.0 mmol/L   Urinalysis With Culture If Indicated - Urine, Catheter    Collection Time:  10/14/19  7:29 PM   Result Value Ref Range    Color, UA Dark Yellow (A) Yellow, Straw    Appearance, UA Turbid (A) Clear    pH, UA 5.5 5.0 - 8.0    Specific Gravity, UA 1.016 1.005 - 1.030    Glucose, UA Negative Negative    Ketones, UA Trace (A) Negative    Bilirubin, UA Small (1+) (A) Negative    Blood, UA Moderate (2+) (A) Negative    Protein, UA 30 mg/dL (1+) (A) Negative    Leuk Esterase, UA Large (3+) (A) Negative    Nitrite, UA Negative Negative    Urobilinogen, UA 0.2 E.U./dL 0.2 - 1.0 E.U./dL   Urinalysis, Microscopic Only - Urine, Catheter    Collection Time: 10/14/19  7:29 PM   Result Value Ref Range    RBC, UA 3-5 (A) None Seen /HPF    WBC, UA Too Numerous to Count (A) None Seen /HPF    Bacteria, UA 3+ (A) None Seen /HPF    Squamous Epithelial Cells, UA 3-6 (A) None Seen, 0-2 /HPF    Hyaline Casts, UA 3-6 None Seen /LPF    Granular Casts, UA 0-2 None Seen /LPF    Methodology Manual Light Microscopy    S. Pneumo Ag Urine or CSF - Urine, Urine, Catheter    Collection Time: 10/14/19  7:29 PM   Result Value Ref Range    Strep Pneumo Ag Negative Negative   Legionella Antigen, Urine - Urine, Urine, Catheter    Collection Time: 10/14/19  7:29 PM   Result Value Ref Range    LEGIONELLA ANTIGEN, URINE Negative Negative   Respiratory Panel, PCR - Swab, Nasopharynx    Collection Time: 10/14/19  7:51 PM   Result Value Ref Range    ADENOVIRUS, PCR Not Detected Not Detected    Coronavirus 229E Not Detected Not Detected    Coronavirus HKU1 Not Detected Not Detected    Coronavirus NL63 Not Detected Not Detected    Coronavirus OC43 Not Detected Not Detected    Human Metapneumovirus Not Detected Not Detected    Human Rhinovirus/Enterovirus Not Detected Not Detected    Influenza B PCR Not Detected Not Detected    Parainfluenza Virus 1 Not Detected Not Detected    Parainfluenza Virus 2 Not Detected Not Detected    Parainfluenza Virus 3 Not Detected Not Detected    Parainfluenza Virus 4 Not Detected Not Detected    Bordetella  pertussis pcr Not Detected Not Detected    Influenza A H1 2009 PCR Not Detected Not Detected    Chlamydophila pneumoniae PCR Not Detected Not Detected    Mycoplasma pneumo by PCR Not Detected Not Detected    Influenza A PCR Not Detected Not Detected    Influenza A H3 Not Detected Not Detected    Influenza A H1 Not Detected Not Detected    RSV, PCR Not Detected Not Detected   POC Glucose Once    Collection Time: 10/14/19 11:31 PM   Result Value Ref Range    Glucose 120 (H) 70 - 105 mg/dL   Troponin    Collection Time: 10/14/19 11:33 PM   Result Value Ref Range    Troponin I 0.340 (C) 0.000 - 0.030 ng/mL   Lactic Acid, Plasma    Collection Time: 10/14/19 11:33 PM   Result Value Ref Range    Lactate 13.2 (C) 0.5 - 2.2 mmol/L   Blood Gas, Arterial    Collection Time: 10/14/19 11:58 PM   Result Value Ref Range    Site Left Brachial     John's Test N/A     pH, Arterial 7.151 (C) 7.350 - 7.450 pH units    pCO2, Arterial 22.2 (L) 35.0 - 48.0 mm Hg    pO2, Arterial 91.2 83.0 - 108.0 mm Hg    HCO3, Arterial 7.7 (L) 21.0 - 28.0 mmol/L    Base Excess, Arterial -19.3 (L) 0.0 - 3.0 mmol/L    O2 Saturation, Arterial 94.4 94.0 - 98.0 %    CO2 Content 8.4 (L) 22 - 29 mmol/L    Barometric Pressure for Blood Gas      Modality Vapotherm     FIO2 60 %    Hemodilution No    Basic Metabolic Panel    Collection Time: 10/15/19 12:29 AM   Result Value Ref Range    Glucose 101 (H) 65 - 99 mg/dL    BUN 67 (H) 8 - 20 mg/dL    Creatinine 3.70 (H) 0.40 - 1.00 mg/dL    Sodium 148 (H) 136 - 144 mmol/L    Potassium 4.3 3.6 - 5.1 mmol/L    Chloride 109 101 - 111 mmol/L    CO2 15.0 (L) 22.0 - 32.0 mmol/L    Calcium 7.5 (L) 8.9 - 10.3 mg/dL    eGFR  African Amer      eGFR Non African Amer 11 (L) >60 mL/min/1.73    BUN/Creatinine Ratio 18.1 5.4 - 26.2    Anion Gap 28.3 (C) 5.0 - 15.0 mmol/L   CBC (No Diff)    Collection Time: 10/15/19 12:34 AM   Result Value Ref Range    WBC 21.70 (H) 3.40 - 10.80 10*3/mm3    RBC 3.55 (L) 3.77 - 5.28 10*6/mm3     Hemoglobin 11.0 (L) 12.0 - 15.9 g/dL    Hematocrit 35.1 34.0 - 46.6 %    MCV 98.9 (H) 79.0 - 97.0 fL    MCH 30.9 26.6 - 33.0 pg    MCHC 31.2 (L) 31.5 - 35.7 g/dL    RDW 17.5 (H) 12.3 - 15.4 %    RDW-SD 60.8 (H) 37.0 - 54.0 fl    MPV 8.7 6.0 - 12.0 fL    Platelets 187 140 - 450 10*3/mm3   Protime-INR    Collection Time: 10/15/19  4:23 AM   Result Value Ref Range    Protime 29.6 (H) 9.6 - 11.7 Seconds    INR 3.14 (C) 0.90 - 1.10   Lactic Acid, Plasma    Collection Time: 10/15/19  4:24 AM   Result Value Ref Range    Lactate 8.6 (C) 0.5 - 2.2 mmol/L   Troponin    Collection Time: 10/15/19  4:24 AM   Result Value Ref Range    Troponin I 0.430 (C) 0.000 - 0.030 ng/mL   Basic Metabolic Panel    Collection Time: 10/15/19  4:24 AM   Result Value Ref Range    Glucose 161 (H) 65 - 99 mg/dL    BUN 69 (H) 8 - 20 mg/dL    Creatinine 3.50 (H) 0.40 - 1.00 mg/dL    Sodium 147 (H) 136 - 144 mmol/L    Potassium 4.6 3.6 - 5.1 mmol/L    Chloride 107 101 - 111 mmol/L    CO2 20.0 (L) 22.0 - 32.0 mmol/L    Calcium 7.3 (L) 8.9 - 10.3 mg/dL    eGFR  African Amer      eGFR Non African Amer 12 (L) >60 mL/min/1.73    BUN/Creatinine Ratio 19.7 5.4 - 26.2    Anion Gap 24.6 (H) 5.0 - 15.0 mmol/L   Magnesium    Collection Time: 10/15/19  4:24 AM   Result Value Ref Range    Magnesium 2.8 (H) 1.8 - 2.5 mg/dL   Phosphorus    Collection Time: 10/15/19  4:24 AM   Result Value Ref Range    Phosphorus 4.3 2.4 - 4.7 mg/dL   CBC Auto Differential    Collection Time: 10/15/19  4:24 AM   Result Value Ref Range    WBC 24.90 (H) 3.40 - 10.80 10*3/mm3    RBC 3.72 (L) 3.77 - 5.28 10*6/mm3    Hemoglobin 11.2 (L) 12.0 - 15.9 g/dL    Hematocrit 36.4 34.0 - 46.6 %    MCV 97.9 (H) 79.0 - 97.0 fL    MCH 30.0 26.6 - 33.0 pg    MCHC 30.6 (L) 31.5 - 35.7 g/dL    RDW 17.1 (H) 12.3 - 15.4 %    RDW-SD 58.2 (H) 37.0 - 54.0 fl    MPV 9.1 6.0 - 12.0 fL    Platelets 185 140 - 450 10*3/mm3   Type & Screen    Collection Time: 10/15/19  4:24 AM   Result Value Ref Range    ABO Type  O     RH type Positive     Antibody Screen Negative     T&S Expiration Date 10/18/2019 11:59:59 PM    Scan Slide    Collection Time: 10/15/19  4:24 AM   Result Value Ref Range    Scan Slide     Manual Differential    Collection Time: 10/15/19  4:24 AM   Result Value Ref Range    Neutrophil % 84.0 (H) 42.7 - 76.0 %    Lymphocyte % 4.0 (L) 19.6 - 45.3 %    Monocyte % 6.0 5.0 - 12.0 %    Bands %  6.0 (H) 0.0 - 5.0 %    Neutrophils Absolute 22.41 (H) 1.70 - 7.00 10*3/mm3    Lymphocytes Absolute 1.00 0.70 - 3.10 10*3/mm3    Monocytes Absolute 1.49 (H) 0.10 - 0.90 10*3/mm3    nRBC 2.0 (H) 0.0 - 0.2 /100 WBC    [place] Cells Mod/2+ None Seen    Polychromasia Slight/1+ None Seen    Toxic Granulation Mod/2+ None Seen    Vacuolated Neutrophils Slight/1+ None Seen    Platelet Morphology Normal Normal   Blood Gas, Arterial    Collection Time: 10/15/19  4:26 AM   Result Value Ref Range    Site Left Brachial     John's Test N/A     pH, Arterial 7.383 7.350 - 7.450 pH units    pCO2, Arterial 32.0 (L) 35.0 - 48.0 mm Hg    pO2, Arterial 93.3 83.0 - 108.0 mm Hg    HCO3, Arterial 19.1 (L) 21.0 - 28.0 mmol/L    Base Excess, Arterial -5.1 (L) 0.0 - 3.0 mmol/L    O2 Saturation, Arterial 97.2 94.0 - 98.0 %    CO2 Content 20.1 (L) 22 - 29 mmol/L    Barometric Pressure for Blood Gas      Modality Vapotherm     FIO2 60 %    Hemodilution No    POC Glucose Once    Collection Time: 10/15/19  5:44 AM   Result Value Ref Range    Glucose 161 (H) 70 - 105 mg/dL   Adult Transthoracic Echo Complete W/ Cont if Necessary Per Protocol    Collection Time: 10/15/19  7:54 AM   Result Value Ref Range    BSA 1.6 m^2    BH CV ECHO FEDERICO - RVDD 2.5 cm    IVSd 0.85 cm    LVIDd 3.6 cm    LVIDs 2.4 cm    LVPWd 0.9 cm    IVS/LVPW 0.94     FS 32.6 %    EDV(Teich) 54.0 ml    ESV(Teich) 20.6 ml    EF(Teich) 61.9 %    EDV(cubed) 46.2 ml    ESV(cubed) 14.2 ml    EF(cubed) 69.3 %    LV mass(C)d 88.9 grams    LV mass(C)dI 55.8 grams/m^2    SV(Teich) 33.4 ml    SI(Teich)  21.0 ml/m^2    SV(cubed) 32.0 ml    SI(cubed) 20.1 ml/m^2    Ao root diam 2.6 cm    Ao root area 5.2 cm^2    ACS 1.6 cm    LVOT diam 1.8 cm    LVOT area 2.4 cm^2    RVOT diam 2.4 cm    RVOT area 4.4 cm^2    EDV(MOD-sp4) 31.5 ml    ESV(MOD-sp4) 14.5 ml    EF(MOD-sp4) 54.0 %    SV(MOD-sp4) 17.0 ml    SI(MOD-sp4) 10.7 ml/m^2    Ao root area (BSA corrected) 1.6     LV [place] Vol (BSA corrected) 19.8 ml/m^2    LV Sys Vol (BSA corrected) 9.1 ml/m^2    MV E max jermaine 81.7 cm/sec    MV V2 max 83.7 cm/sec    MV max PG 2.8 mmHg    MV V2 mean 44.4 cm/sec    MV mean PG 1.1 mmHg    MV V2 VTI 13.8 cm    MVA(VTI) 1.8 cm^2    MV dec time 0.2 sec    LV V1 max PG 1.2 mmHg    LV V1 mean PG 0.59 mmHg    LV V1 max 54.8 cm/sec    LV V1 mean 35.8 cm/sec    LV V1 VTI 10.4 cm    MR max jermaine 255.2 cm/sec    MR max PG 26.1 mmHg    SV(LVOT) 25.4 ml    SV(RVOT) 34.2 ml    SI(LVOT) 15.9 ml/m^2    PA V2 max 61.0 cm/sec    PA max PG 1.5 mmHg    PA max PG (full) 0.43 mmHg     CV ECHO FEDERICO - PVA(V,A) 3.7 cm^2     CV ECHO FEDERICO - PVA(V,D) 3.7 cm^2    RV V1 max PG 1.1 mmHg    RV V1 mean PG 0.44 mmHg    RV V1 max 51.4 cm/sec    RV V1 mean 30.7 cm/sec    RV V1 VTI 7.8 cm    TR max jermaine 291.7 cm/sec    RVSP(TR) 37.0 mmHg    RAP systole 3.0 mmHg    Qp/Qs 1.3      CV ECHO FEDERICO - BZI_BMI 22.5 kilograms/m^2     CV ECHO FEDERICO - BSA(HAYCOCK) 1.6 m^2     CV ECHO FEDERICO - BZI_METRIC_WEIGHT 57.6 kg    BH CV ECHO FEDERICO - BZI_METRIC_HEIGHT 160.0 cm    EF(MOD-bp) 54.0 %    LA dimension(2D) 4.0 cm   Lactic Acid, Plasma    Collection Time: 10/15/19  8:03 AM   Result Value Ref Range    Lactate 5.2 (C) 0.5 - 2.2 mmol/L   Calcium, Ionized    Collection Time: 10/15/19  8:14 AM   Result Value Ref Range    Ionized Calcium 0.96 (L) 1.20 - 1.30 mmol/L       Current Facility-Administered Medications:   •  [COMPLETED] amiodarone in dextrose 5% (NEXTERONE) loading dose 150mg/100mL, 150 mg, Intravenous, Once, Last Rate: 600 mL/hr at 10/14/19 2125, 150 mg at 10/14/19 2125  **FOLLOWED BY** [] AMIODARONE HCL IN DEXTROSE 450-5 MG/250ML-% IV SOLN, 1 mg/min, Intravenous, Continuous, Last Rate: 33.3 mL/hr at 10/14/19 2147, 1 mg/min at 10/14/19 2147 **FOLLOWED BY** AMIODARONE HCL IN DEXTROSE 450-5 MG/250ML-% IV SOLN, 0.5 mg/min, Intravenous, Continuous, Jim Renae APRN, Last Rate: 16.67 mL/hr at 10/15/19 0559, 0.5 mg/min at 10/15/19 0559  •  calcium gluconate 2 g/100 mL NS IVPB, 2 g, Intravenous, Once, Haydee Meier APRN  •  dextrose (D50W) 25 g/ 50mL Intravenous Solution 25 g, 25 g, Intravenous, Q15 Min PRN, Jim Renae APRN  •  dextrose (GLUTOSE) oral gel 15 g, 15 g, Oral, Q15 Min PRN, Jim Renae APRN  •  famotidine (PEPCID) tablet 20 mg, 20 mg, Oral, Q AM, Jim Renae APRN, 20 mg at 10/15/19 0559  •  glucagon (human recombinant) (GLUCAGEN DIAGNOSTIC) injection 1 mg, 1 mg, Subcutaneous, Q15 Min PRN, Jim Renae APRN  •  insulin lispro (humaLOG) injection 0-7 Units, 0-7 Units, Subcutaneous, Q6H, Jim Renae APRN, 2 Units at 10/15/19 0559  •  meropenem (MERREM) 500 mg in sodium chloride 0.9 % 100 mL IVPB, 500 mg, Intravenous, Q24H, Jim Renae APRN  •  ondansetron (ZOFRAN) injection 4 mg, 4 mg, Intravenous, Q6H PRN, Jim Renae APRN, 4 mg at 10/15/19 0037  •  Pharmacy to Dose meropenem (MERREM), , Does not apply, Continuous PRN, Jim Renae APRN  •  Pharmacy to dose vancomycin, , Does not apply, Continuous PRN, Jim Renae APRN  •  phenylephrine (AREN-SYNEPHRINE) 50 mg/250 mL (0.2 mg/mL) in 0.9% NS  infusion, 0.5-3 mcg/kg/min, Intravenous, Titrated, Jim Renae APRN, Last Rate: 29 mL/hr at 10/15/19 0559, 2 mcg/kg/min at 10/15/19 0559  •  sodium bicarbonate 8.4 % 150 mEq in dextrose (D5W) 5 % 1,000 mL infusion (greater than 75 mEq), 150 mEq, Intravenous, Continuous, Jim Renae APRN, Last Rate: 75 mL/hr at 10/15/19 0030, 150 mEq at 10/15/19 0030  •  sodium chloride 0.9 % flush 10 mL, 10 mL, Intravenous, PRN, Venus,  Jossue BEY MD  •  sodium chloride 0.9 % flush 10 mL, 10 mL, Intravenous, Q12H, Jim Renae APRN, 10 mL at 10/15/19 0803  •  sodium chloride 0.9 % flush 10 mL, 10 mL, Intravenous, PRN, Jim Renae APRN  •  sodium chloride 0.9 % infusion, 75 mL/hr, Intravenous, Continuous, Jossue Donovan MD, Last Rate: 75 mL/hr at 10/14/19 2045, 75 mL/hr at 10/14/19 2045  •  vancomycin 750 mg in sodium chloride 0.9 % 100 mL IVPB, 15 mg/kg, Intravenous, PRN, Jim Renae APRN    Assessment:  Acute kidney injury on chronic kidney disease stage III baseline creatinine 1.4   -Due to low blood pressure and sepsis   -With metabolic acidosis   -Creatinine was rising this morning is 3.5 from 3.7   -Normal urine output   -Continue bicarb drip   -Recheck labs this afternoon  Hypocalcemia replace  Acute hypoxic respiratory failure  Septic shock  Urosepsis  Leukocytosis  Pleural effusions  Status post hyperkalemia  Atrial fibrillation with rapid ventricular response  History of hypertension  History of lower extremity DVT and PE  New onset congestive heart failure  Abdominal pain with possible ileus  Non-ST elevation MI    Recommendations:  Bicarb drip  Replace calcium  Check CPK  Recheck labs this afternoon  Her urine output is minimal but her creatinine is now started to come down  There is no immediate need for dialysis  Patient is critically ill  Discussed with her daughter-in-law  Discussed with her nurse  Critical care time spent with the patient and family is 32 minutes      Margarita Gallardo MD  10/15/2019  9:30 AM

## 2019-10-15 NOTE — CONSULTS
Referring Provider:   Reason for Consultation: Atrial fibrillation with rapid ventricular rate    Patient Care Team:  Florentino Kimbrough MD as PCP - General (Family Medicine)  Gosia Hodgson MD as PCP - Family Medicine    Chief complaint weakness        History of present illness:  Dinora Vásquez is a 89 y.o. female who presents with sepsis and palpitations.     89-year-old white female patient with previous history of DVT and pulmonary embolus history of paroxysmal atrial fibrillation and sinus bradycardia Status post permanent pacemaker placement  Patient was at the nephrology office found to have hypotension tachycardia  Patient was transferred to the emergency room found to have urosepsis with atrial fibrillation rapid ventricular rate  Patient denies of any chest pain but she does have some shortness of breath  Patient denies of any syncopal episodes lower extremity edema PND orthopnea    Review of Systems   Constitution: Positive for malaise/fatigue. Negative for fever.   HENT: Negative for congestion and hearing loss.    Eyes: Negative for double vision and visual disturbance.   Cardiovascular: Positive for palpitations. Negative for chest pain, claudication, dyspnea on exertion, leg swelling and syncope.   Respiratory: Negative for cough and shortness of breath.    Endocrine: Negative for cold intolerance.   Skin: Negative for color change and rash.   Musculoskeletal: Negative for arthritis and joint pain.   Gastrointestinal: Negative for abdominal pain and heartburn.   Genitourinary: Negative for hematuria.   Neurological: Negative for excessive daytime sleepiness and dizziness.   Psychiatric/Behavioral: Negative for depression. The patient is not nervous/anxious.    All other systems reviewed and are negative.      History  Past Medical History:   Diagnosis Date   • Ankle wound, right, initial encounter    • Aortic stenosis    • Atrial fibrillation (CMS/HCC)    • Coronary artery disease    •  DVT (deep venous thrombosis) (CMS/HCC)    • Hypothyroidism    • Left bundle branch block    • Pulmonary embolus (CMS/HCC)    • Pulmonary hypertension (CMS/HCC)    • Sinus bradycardia        Past Surgical History:   Procedure Laterality Date   • COLOSTOMY     • ORIF ANKLE FRACTURE Right 2013    Right Ankle ORIF    • PACEMAKER IMPLANTATION  11/29/2017    Dual Chamber [employer]   • TOTAL ABDOMINAL HYSTERECTOMY         Family History   Problem Relation Age of Onset   • Heart disease Mother    • Diabetes Mother    • Heart disease Sister    • Diabetes Sister        Social History     Tobacco Use   • Smoking status: Never Smoker   • Smokeless tobacco: Never Used   Substance Use Topics   • Alcohol use: No     Frequency: Never   • Drug use: No        Medications Prior to Admission   Medication Sig Dispense Refill Last Dose   • cholecalciferol (VITAMIN D3) 1000 units tablet Daily.      • Cranberry 600 MG tablet Take 600 mg by mouth 3 (Three) Times a Day.      • ELIQUIS 2.5 MG tablet tablet Take 2.5 mg by mouth 2 (Two) Times a Day.      • fluorometholone (FML) 0.1 % ophthalmic suspension Administer 1 Drop/kg to both eyes Daily.      • levothyroxine (SYNTHROID, LEVOTHROID) 75 MCG tablet Every Morning Before Breakfast.      • loratadine (CLARITIN) 10 MG tablet Take 10 mg by mouth Daily.      • Magnesium 400 MG tablet MAGNESIUM 400 MG TABS      • metoprolol tartrate (LOPRESSOR) 50 MG tablet Take 50 mg by mouth 2 (Two) Times a Day.      • mirtazapine (REMERON) 7.5 MG tablet 7.5 mg Daily.      • polyvinyl alcohol (ARTIFICIAL TEARS) 1.4 % ophthalmic solution Every 12 (Twelve) Hours.      • Prenatal w/o A Vit-Fe Fum-FA (BP MULTINATAL PLUS) 30-1 MG tablet Daily.      • saccharomyces boulardii (FLORASTOR) 250 MG capsule FLORASTOR 250 MG CAPS      • sertraline (ZOLOFT) 25 MG tablet Take 25 mg by mouth Daily.            Cephalexin; Sulfadiazine; Trimethoprim; and Trospium    Scheduled Meds:    dexamethasone 1 drop Both Eyes Q8H  "  famotidine 20 mg Oral Q AM   insulin lispro 0-7 Units Subcutaneous Q6H   levothyroxine 75 mcg Oral QAM AC   meropenem 500 mg Intravenous Q24H   mirtazapine 7.5 mg Oral Nightly   polyethyl glycol-propyl glycol 1 drop Both Eyes BID   sertraline 25 mg Oral Daily   sodium chloride 10 mL Intravenous Q12H     Continuous Infusions:    amiodarone 0.5 mg/min Last Rate: 0.5 mg/min (10/15/19 1530)   Pharmacy to Dose meropenem (MERREM)     Pharmacy to dose vancomycin     phenylephrine 0.5-3 mcg/kg/min Last Rate: 1.8 mcg/kg/min (10/15/19 1700)   sodium chloride 75 mL/hr      PRN Meds:.dextrose  •  dextrose  •  glucagon (human recombinant)  •  ondansetron  •  Pharmacy to Dose meropenem (MERREM)  •  Pharmacy to dose vancomycin  •  sodium chloride  •  sodium chloride  •  vancomycin        VITAL SIGNS  Vitals:    10/15/19 1500 10/15/19 1600 10/15/19 1641 10/15/19 1700   BP: 110/70 129/100  132/83   Pulse: (!) 128 (!) 129 (!) 125 (!) 128   Resp:       Temp:       TempSrc:       SpO2: 100% 98% 96% 100%   Weight:       Height:           Flowsheet Rows      First Filed Value   Admission Height  160 cm (63\") Documented at 10/14/2019 1551   Admission Weight  48.3 kg (106 lb 7.7 oz) Documented at 10/14/2019 1551           TELEMETRY: Atrial fibrillation with rapid ventricular rate    Physical Exam:  Physical Exam   Constitutional: She is oriented to person, place, and time. She appears well-nourished. She is cooperative.   HENT:   Head: Normocephalic and atraumatic.   Mouth/Throat: Uvula is midline and oropharynx is clear and moist. No oral lesions.   Eyes: Conjunctivae are normal. No scleral icterus.   Neck: Trachea normal. Neck supple. No JVD present. Carotid bruit is not present. No thyromegaly present.   Cardiovascular: Normal rate, S1 normal, S2 normal, normal heart sounds, intact distal pulses and normal pulses. An irregularly irregular rhythm present. PMI is not displaced. Exam reveals no gallop and no friction rub.   No murmur " heard.  Pulmonary/Chest: Effort normal and breath sounds normal.   Abdominal: Soft. Bowel sounds are normal.   Musculoskeletal: Normal range of motion.   Neurological: She is alert and oriented to person, place, and time. She has normal strength.   No focal deficits   Skin: Skin is warm. No cyanosis.   Psychiatric: She has a normal mood and affect.                LAB RESULTS (LAST 7 DAYS)    CBC  Results from last 7 days   Lab Units 10/15/19  0424 10/15/19  0034 10/14/19  1620   WBC 10*3/mm3 24.90* 21.70* 18.80*   RBC 10*6/mm3 3.72* 3.55* 4.29   HEMOGLOBIN g/dL 11.2* 11.0* 13.1   HEMATOCRIT % 36.4 35.1 44.9   MCV fL 97.9* 98.9* 104.8*   PLATELETS 10*3/mm3 185 187 210       BMP  Results from last 7 days   Lab Units 10/15/19  1121 10/15/19  0424 10/15/19  0029 10/14/19  1647 10/14/19  1619   SODIUM mmol/L 146* 147* 148* 144 142   POTASSIUM mmol/L 4.2 4.6 4.3 5.2* 5.2*   CHLORIDE mmol/L 104 107 109 102 103   CO2 mmol/L 23.0 20.0* 15.0* 14.0* 12.0*   BUN mg/dL 68* 69* 67* 71* 71*   CREATININE mg/dL 3.18* 3.50* 3.70* 3.80* 4.00*   GLUCOSE mg/dL 213* 161* 101* 142* 152*   MAGNESIUM mg/dL  --  2.8*  --   --  3.4*   PHOSPHORUS mg/dL  --  4.3  --   --  6.5*       CMP   Results from last 7 days   Lab Units 10/15/19  1121 10/15/19  0424 10/15/19  0029 10/14/19  1647 10/14/19  1619   SODIUM mmol/L 146* 147* 148* 144 142   POTASSIUM mmol/L 4.2 4.6 4.3 5.2* 5.2*   CHLORIDE mmol/L 104 107 109 102 103   CO2 mmol/L 23.0 20.0* 15.0* 14.0* 12.0*   BUN mg/dL 68* 69* 67* 71* 71*   CREATININE mg/dL 3.18* 3.50* 3.70* 3.80* 4.00*   GLUCOSE mg/dL 213* 161* 101* 142* 152*   ALBUMIN g/dL 3.00*  --   --  3.50 3.50   BILIRUBIN mg/dL 1.4*  --   --  2.3* 2.1*   ALK PHOS U/L 94  --   --  97* 97*   AST (SGOT) U/L 78*  --   --  51* 51*   ALT (SGPT) U/L 27  --   --  <5* <5*         BNP  Results from last 7 days   Lab Units 10/14/19  1619   BNP pg/mL 1,784.0*       TROPONIN  Results from last 7 days   Lab Units 10/15/19  1121 10/15/19  0424   TROPONIN  I ng/mL  --  0.430*   TROPONIN T ng/mL 0.134*  --        CoAg  Results from last 7 days   Lab Units 10/15/19  0423 10/14/19  1619   INR  3.14* 2.44*   APTT seconds  --  26.2       Creatinine Clearance  Estimated Creatinine Clearance: 10.1 mL/min (A) (by C-G formula based on SCr of 3.18 mg/dL (H)).    ABG  Results from last 7 days   Lab Units 10/15/19  0426 10/14/19  2358 10/14/19  1646   PH, ARTERIAL pH units 7.383 7.151* 7.185*   PCO2, ARTERIAL mm Hg 32.0* 22.2* 31.9*   PO2 ART mm Hg 93.3 91.2 19.5*   O2 SATURATION ART % 97.2 94.4 21.8*   BASE EXCESS ART mmol/L -5.1* -19.3* -15.1*       Radiology  Ct Abdomen Pelvis Without Contrast    Result Date: 10/14/2019   1. Subtotal colectomy. 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 The subcutaneous portion of the ileal loop is slightly redundant however there is no small bowel distention to suggest obstruction. There are some scattered small bowel air-fluid levels in nondistended loops which could reflect an associated mild ileus or enteritis. 2. Moderate to large bilateral pleural effusions and dependent bibasilar atelectasis. In addition there is a mild amount of ascites deep in the pelvis and there is diffuse increased soft tissue stranding in the subcutaneous fat and mesenteric fat. The constellation of these findings does raise concern for changes of congestive heart failure. 3. Incidental note is made of a 3 cm right lateral hernia containing mesenteric fat.  Electronically Signed By-Ulysses Carpenter On:10/14/2019 7:16 PM This report was finalized on [id number] by  Ulysses Carpenter, .    Us Renal Limited    Result Date: 10/15/2019  Unremarkable retroperitoneal ultrasound examination. No evidence for hydronephrosis. Electronically signed by:  Jw Weiner M.D.  10/15/2019 3:23 AM    Xr Chest 1 View    Result Date: 10/14/2019  Under 1. PICC line in good position with the tip in the superior vena cava. 2. Findings suggesting changes of pulmonary edema  and congestive failure which are worsened from the previous study.  Electronically Signed By-Ulysses Carpenter On:10/14/2019 7:03 PM This report was finalized on [id number] by  Ulysses Carpenter, .    Xr Chest 1 View    Result Date: 10/14/2019  Renomegaly. Basilar infiltrates and effusions. The appearance is somewhat more suggestive of changes of pulmonary edema and congestive failure. An underlying pneumonia cannot be excluded.  Electronically Signed By-Ulysses Carpenter On:10/14/2019 5:14 PM This report was finalized on [id number] by  Ulysses Carpenter, .    Xr Abdomen Kub    Result Date: 10/15/2019  1.Tip of the enteric tube terminates in the left mid abdomen, likely in the gastric body. 2.Nonspecific upper abdominal bowel gas pattern. 3.Moderate bilateral pleural effusions with underlying atelectasis.  Electronically Signed By-Gloria Heath On:10/15/2019 8:37 AM This report was finalized on [id number] by  Gloria Heath, .          EKG          I personally viewed and interpreted the patient's EKG/Telemetry data:    ECHOCARDIOGRAM:         STRESS MYOVIEW:    CARDIAC CATHETERIZATION:    OTHER:         Assessment/Plan       Septic shock (CMS/HCC)    Atrial fibrillation with RVR (CMS/HCC)    Acute on chronic renal failure (CMS/HCC)    Elevated brain natriuretic peptide (BNP) level    Elevated troponin      Septic shock currently stabilizing on the inotropes  Atrial fibrillation with rapid ventricular rate we will try IV amiodarone  Acute on chronic renal failure with elevated BNP significant fluid overload noted but needs to be caution with IV fluids  Elevated troponin probably demand ischemia  We will continue supportive care  Haydee Ceja tomorrow    I discussed the patients findings and my recommendations with patient and attending nurse    Royal Palacios MD  10/15/19  6:49 PM

## 2019-10-15 NOTE — SIGNIFICANT NOTE
10/14/19 2004   Pastoral/Spiritual Care   Pastoral Care Visit Type initial  (At home page)   Pastoral Care Source family request   Receptivity to Spiritual Care visit welcomed  (pt welcomed chap, family member not present)   Pastoral Care Request prayer support   Pastoral Care Interventions prayer support provided;supportive conversation provided   Pastoral Care Response expressing self, indicated difficulty;thanks expressed;visit helpful   Use of Spiritual Resources prayer   Pastoral Care Follow-Up follow-up, none required as presently assessed      paged at home to visit pt. Staff said there was a family member that requested , but when he arrived, no family present. Chap engaged pt who was very hard of hearing. Pt is [employer] and chap offered to prayed for her. She agreed. Chap had to get right next to her ear and talk loudly for her to understand. He prayed and pt was thankful. She had no other needs and chap stepped away, leaving a card so family will know about visit.  said goodbye to pt and she smiled and said goodbye. No other needs.

## 2019-10-15 NOTE — PROGRESS NOTES
Pulmonary/ Critical Care progress Note        Patient Name:  Dinora Vásquez    :  1926    Medical Record:  [id number]    Primary Care Physician     Florentino Kimbrough MD [facility]  Dinora Vásquez is a 89 y.o. female who was presented to the ER after being seen earlier by Nephrology and found to be hypotensive and tachycardic; in addition she complained of dyspnea, abdominal pain and not feeling well.  She had a bolus of IVFs started and transferred to the ER.  Workup in the ER revealed afib with RVR, UTI, septic shock, CHF, STEMI, acute hypoxic respiratory failure and LEYLA.  She had IVFs per sepsis protocol given and started on Meropenem.  She was given a bolus of amio and Cardizem without improvement in her heart rate and subsequently developed hypotension.  She had a PICC ordered by the ER and was started on Levophed.   The patient is currently difficult to get a history from as she is obtunded.  Her sats were in the 70's and she was started on Precision Flow with improvement.      10/15/19: Short of breath with minimal activity.  Remains on Precision flow supplemental oxygen.  No chest pains.  No nausea or vomiting    Review of Systems    As above        Home medicationS  Prior to Admission medications    Medication Sig Start Date End Date Taking? Authorizing Provider   cholecalciferol (VITAMIN D3) 1000 units tablet Daily. 17   Aravind Silva MD   Cranberry 600 MG tablet Take 600 mg by mouth 3 (Three) Times a Day.    Aravind Silva MD   ELIQUIS 2.5 MG tablet tablet Take 2.5 mg by mouth 2 (Two) Times a Day. 19   Aravind Silva MD   fluorometholone (FML) 0.1 % ophthalmic suspension Administer 1 Drop/kg to both eyes Daily. 19   Aravind Silva MD   levothyroxine (SYNTHROID, LEVOTHROID) 75 MCG tablet Every Morning Before Breakfast. 19   Aravind Silva MD   loratadine (CLARITIN) 10 MG tablet Take 10 mg by mouth Daily.    Aravind Silva MD    Magnesium 400 MG tablet MAGNESIUM 400 MG TABS 11/2/18   Aravind Silva MD   metoprolol tartrate (LOPRESSOR) 50 MG tablet Take 50 mg by mouth 2 (Two) Times a Day. 6/11/19   Aravind Silva MD   mirtazapine (REMERON) 7.5 MG tablet 7.5 mg Daily. 6/18/19   Aravind Silva MD   polyvinyl alcohol (ARTIFICIAL TEARS) 1.4 % ophthalmic solution Every 12 (Twelve) Hours. 11/2/18   Aravind Silva MD   Prenatal w/o A Vit-Fe Fum-FA (BP MULTINATAL PLUS) 30-1 MG tablet Daily. 12/19/17   Aravind Silva MD   saccharomyces boulardii (FLORASTOR) 250 MG capsule FLORASTOR 250 MG CAPS 6/11/18   Aravind Silva MD   sertraline (ZOLOFT) 25 MG tablet Take 25 mg by mouth Daily. 6/11/19   Aravind Silva MD       Medical History    Past Medical History:   Diagnosis Date   • Ankle wound, right, initial encounter    • Aortic stenosis    • Atrial fibrillation (CMS/HCC)    • Coronary artery disease    • DVT (deep venous thrombosis) (CMS/HCC)    • Hypothyroidism    • Left bundle branch block    • Pulmonary embolus (CMS/HCC)    • Pulmonary hypertension (CMS/HCC)    • Sinus bradycardia         Surgical History    Past Surgical History:   Procedure Laterality Date   • COLOSTOMY     • ORIF ANKLE FRACTURE Right 2013    Right Ankle ORIF    • PACEMAKER IMPLANTATION  11/29/2017    Dual Chamber [employer]   • TOTAL ABDOMINAL HYSTERECTOMY          Family History    Family History   Problem Relation Age of Onset   • Heart disease Mother    • Diabetes Mother    • Heart disease Sister    • Diabetes Sister        Social History    Social History     Tobacco Use   • Smoking status: Never Smoker   • Smokeless tobacco: Never Used   Substance Use Topics   • Alcohol use: No     Frequency: Never        Allergies    Allergies   Allergen Reactions   • Cephalexin Swelling   • Sulfadiazine Rash   • Trimethoprim Hives   • Trospium Hives       Medications    Scheduled Meds:    calcium gluconate 2 g Intravenous Once    famotidine 20 mg Oral Q AM   insulin lispro 0-7 Units Subcutaneous Q6H   meropenem 500 mg Intravenous Q24H   sodium chloride 10 mL Intravenous Q12H     Continuous Infusions:    amiodarone 0.5 mg/min Last Rate: 0.5 mg/min (10/15/19 0559)   Pharmacy to Dose meropenem (MERREM)     Pharmacy to dose vancomycin     phenylephrine 0.5-3 mcg/kg/min Last Rate: 2 mcg/kg/min (10/15/19 0559)   sodium bicarbonate drip (greater than 75 mEq/bag) 150 mEq Last Rate: 150 mEq (10/15/19 0030)   sodium chloride 75 mL/hr Last Rate: 75 mL/hr (10/14/19 2045)     PRN Meds:.dextrose  •  dextrose  •  glucagon (human recombinant)  •  ondansetron  •  Pharmacy to Dose meropenem (MERREM)  •  Pharmacy to dose vancomycin  •  sodium chloride  •  sodium chloride  •  vancomycin      Physical Exam    tMax 24 hrs:  Temp (24hrs), Av.8 °F (36.6 °C), Min:97.5 °F (36.4 °C), Max:98 °F (36.7 °C)    Vitals Ranges:  Temp:  [97.5 °F (36.4 °C)-98 °F (36.7 °C)] 98 °F (36.7 °C)  Heart Rate:  [] 111  Resp:  [16-29] 29  BP: ()/() 127/55  Intake and Output Last 3 Shifts:  I/O last 3 completed shifts:  In: 3743 [I.V.:2294; IV Piggyback:1449]  Out: 300 [Urine:200; Stool:100]    Constitution:  NAD   HEENT:  Atraumatic, PERRL, conjunctiva normal, moist oral mucosa, no nasal discharge.  Trachea is midline.  Respiratory:  No respiratory distress. Diminished breath sounds bilaterally.    Cardiovascular:  Irregular, tachy.   Pulses 2+ and equal in all four extremities.    GI:  Soft, nondistended.  Nontender to palpation.   Ileostomy  with stool output noted.   Extremities: No edema, cyanosis or tenderness.  Integument:  No rashes.   Neurologic:  Alert and oriented x 3.  Moves all four extremities to painful stimuli.   No focal neurologic deficits observed.      labs    Lab Results (last 24 hours)     Procedure Component Value Units Date/Time    Lactic Acid, Plasma [[id number]]  (Abnormal) Collected:  10/15/19 0803    Specimen:  Blood Updated:  10/15/19  0901     Lactate 5.2 mmol/L      Comment: Result checked        S. Pneumo Ag Urine or CSF - Urine, Urine, Catheter [[id number]]  (Normal) Collected:  10/14/19 1929    Specimen:  Urine, Catheter Updated:  10/15/19 0831     Strep Pneumo Ag Negative    Legionella Antigen, Urine - Urine, Urine, Catheter [[id number]]  (Normal) Collected:  10/14/19 1929    Specimen:  Urine, Catheter Updated:  10/15/19 0830     LEGIONELLA ANTIGEN, URINE Negative    Calcium, Ionized [[id number]]  (Abnormal) Collected:  10/15/19 0814    Specimen:  Blood Updated:  10/15/19 0830     Ionized Calcium 0.96 mmol/L     [place] Draw [[id number]] Collected:  10/14/19 1619    Specimen:  Blood Updated:  10/15/19 0729    Narrative:       The following orders were created for panel order [place] Draw.  Procedure                               Abnormality         Status                     ---------                               -----------         ------                     Light Blue Top[[id number]]                                   Final result               Green Top (Gel)[[id number]]                                  Final result               Lavender Top[[id number]]                                                                Gold Top - SST[[id number]]                                   Final result                 Please view results for these tests on the individual orders.    Protime-INR [[id number]]  (Abnormal) Collected:  10/15/19 0423    Specimen:  Blood Updated:  10/15/19 0604     Protime 29.6 Seconds      INR 3.14    CBC & Differential [[id number]] Collected:  10/15/19 0424    Specimen:  Blood Updated:  10/15/19 0558    Narrative:       The following orders were created for panel order CBC & Differential.  Procedure                               Abnormality         Status                     ---------                               -----------         ------                     CBC Auto Differential[[id number]]        Abnormal            Final result                  Please view results for these tests on the individual orders.    CBC Auto Differential [[id number]]  (Abnormal) Collected:  10/15/19 0424    Specimen:  Blood Updated:  10/15/19 0558     WBC 24.90 10*3/mm3      RBC 3.72 10*6/mm3      Hemoglobin 11.2 g/dL      Hematocrit 36.4 %      MCV 97.9 fL      MCH 30.0 pg      MCHC 30.6 g/dL      RDW 17.1 %      RDW-SD 58.2 fl      MPV 9.1 fL      Platelets 185 10*3/mm3     Scan Slide [[id number]] Collected:  10/15/19 0424    Specimen:  Blood Updated:  10/15/19 0558     Scan Slide --     Comment: See Manual Differential Results       Manual Differential [[id number]]  (Abnormal) Collected:  10/15/19 0424    Specimen:  Blood Updated:  10/15/19 0558     Neutrophil % 84.0 %      Lymphocyte % 4.0 %      Monocyte % 6.0 %      Bands %  6.0 %      Neutrophils Absolute 22.41 10*3/mm3      Lymphocytes Absolute 1.00 10*3/mm3      Monocytes Absolute 1.49 10*3/mm3      nRBC 2.0 /100 WBC      [place] Cells Mod/2+     Polychromasia Slight/1+     Toxic Granulation Mod/2+     Vacuolated Neutrophils Slight/1+     Platelet Morphology Normal    POC Glucose Once [[id number]]  (Abnormal) Collected:  10/15/19 0544    Specimen:  Blood Updated:  10/15/19 0545     Glucose 161 mg/dL      Comment: Serial Number: [id number]:  [id number]       Troponin [[id number]]  (Abnormal) Collected:  10/15/19 0424    Specimen:  Blood Updated:  10/15/19 0526     Troponin I 0.430 ng/mL      Comment: Result checked        Narrative:       Troponin I Reference Range:    0.00-0.03  Negative.  Repeat testing in 4-6 hours if clinically indicated.    0.04-0.29  Suspicious for myocardial injury. Serial measurements and clinical  correlation may be necessary to confirm or exclude diagnosis of acute  coronary syndrome.  Repeat testing in 4-6 hours if indicated.     >0.29 Consistent with myocardial injury.  Recommend clinical and laboratory correlation.     Results my be falsely decreased if patient taking Biotin.      Lactic Acid, Plasma [[id number]]  (Abnormal) Collected:  10/15/19 0424    Specimen:  Blood Updated:  10/15/19 0519     Lactate 8.6 mmol/L      Comment: Result checked        Phosphorus [[id number]]  (Normal) Collected:  10/15/19 0424    Specimen:  Blood Updated:  10/15/19 0505     Phosphorus 4.3 mg/dL     Basic Metabolic Panel [[id number]]  (Abnormal) Collected:  10/15/19 0424    Specimen:  Blood Updated:  10/15/19 0500     Glucose 161 mg/dL      BUN 69 mg/dL      Creatinine 3.50 mg/dL      Sodium 147 mmol/L      Potassium 4.6 mmol/L      Chloride 107 mmol/L      CO2 20.0 mmol/L      Calcium 7.3 mg/dL      eGFR   Amer --     Comment: <15 Indicative of kidney failure.        eGFR Non African Amer 12 mL/min/1.73      Comment: <15 Indicative of kidney failure.        BUN/Creatinine Ratio 19.7     Anion Gap 24.6 mmol/L     Narrative:       The MDRD GFR formula is only valid for adults with stable renal function between ages 18 and 70.    Magnesium [[id number]]  (Abnormal) Collected:  10/15/19 0424    Specimen:  Blood Updated:  10/15/19 0458     Magnesium 2.8 mg/dL     Blood Gas, Arterial [[id number]]  (Abnormal) Collected:  10/15/19 0426    Specimen:  Arterial Blood Updated:  10/15/19 0428     Site Left Brachial     John's Test N/A     pH, Arterial 7.383 pH units      pCO2, Arterial 32.0 mm Hg      pO2, Arterial 93.3 mm Hg      HCO3, Arterial 19.1 mmol/L      Base Excess, Arterial -5.1 mmol/L      Comment: Serial Number: [id number]:  [id number]        O2 Saturation, Arterial 97.2 %      CO2 Content 20.1 mmol/L      Barometric Pressure for Blood Gas --     Comment: N/A        Modality Vapotherm     FIO2 60 %      Hemodilution No    Basic Metabolic Panel [[id number]]  (Abnormal) Collected:  10/15/19 0029    Specimen:  Blood Updated:  10/15/19 0123     Glucose 101 mg/dL      BUN 67 mg/dL      Creatinine 3.70 mg/dL      Sodium 148 mmol/L      Potassium 4.3 mmol/L      Chloride 109 mmol/L      CO2 15.0 mmol/L      Calcium  7.5 mg/dL      eGFR   Amer --     Comment: <15 Indicative of kidney failure.        eGFR Non African Amer 11 mL/min/1.73      Comment: <15 Indicative of kidney failure.        BUN/Creatinine Ratio 18.1     Anion Gap 28.3 mmol/L     Narrative:       The MDRD GFR formula is only valid for adults with stable renal function between ages 18 and 70.    CBC (No Diff) [[id number]]  (Abnormal) Collected:  10/15/19 0034    Specimen:  Blood Updated:  10/15/19 0048     WBC 21.70 10*3/mm3      RBC 3.55 10*6/mm3      Hemoglobin 11.0 g/dL      Comment: Result checked         Hematocrit 35.1 %      MCV 98.9 fL      MCH 30.9 pg      MCHC 31.2 g/dL      RDW 17.5 %      RDW-SD 60.8 fl      MPV 8.7 fL      Platelets 187 10*3/mm3     Troponin [[id number]]  (Abnormal) Collected:  10/14/19 2333    Specimen:  Blood Updated:  10/15/19 0029     Troponin I 0.340 ng/mL     Narrative:       Troponin I Reference Range:    0.00-0.03  Negative.  Repeat testing in 4-6 hours if clinically indicated.    0.04-0.29  Suspicious for myocardial injury. Serial measurements and clinical  correlation may be necessary to confirm or exclude diagnosis of acute  coronary syndrome.  Repeat testing in 4-6 hours if indicated.     >0.29 Consistent with myocardial injury.  Recommend clinical and laboratory correlation.     Results my be falsely decreased if patient taking Biotin.     Lactic Acid, Plasma [[id number]]  (Abnormal) Collected:  10/14/19 2333    Specimen:  Blood Updated:  10/15/19 0029     Lactate 13.2 mmol/L     Blood Gas, Arterial [[id number]]  (Abnormal) Collected:  10/14/19 2358    Specimen:  Arterial Blood Updated:  10/15/19 0006     Site Left Brachial     John's Test N/A     pH, Arterial 7.151 pH units      pCO2, Arterial 22.2 mm Hg      pO2, Arterial 91.2 mm Hg      HCO3, Arterial 7.7 mmol/L      Base Excess, Arterial -19.3 mmol/L      Comment: Serial Number: [id number]:  [id number]        O2 Saturation, Arterial 94.4 %      CO2 Content 8.4  mmol/L      Barometric Pressure for Blood Gas --     Comment: N/A        Modality Vapotherm     FIO2 60 %      Hemodilution No    POC Glucose Once [[id number]]  (Abnormal) Collected:  10/14/19 2331    Specimen:  Blood Updated:  10/14/19 2332     Glucose 120 mg/dL      Comment: Serial Number: [id number]:  [id number]       Respiratory Panel, PCR - Swab, Nasopharynx [[id number]]  (Normal) Collected:  10/14/19 1951    Specimen:  Swab from Nasopharynx Updated:  10/14/19 2236     ADENOVIRUS, PCR Not Detected     Coronavirus 229E Not Detected     Coronavirus HKU1 Not Detected     Coronavirus NL63 Not Detected     Coronavirus OC43 Not Detected     Human Metapneumovirus Not Detected     Human Rhinovirus/Enterovirus Not Detected     Influenza B PCR Not Detected     Parainfluenza Virus 1 Not Detected     Parainfluenza Virus 2 Not Detected     Parainfluenza Virus 3 Not Detected     Parainfluenza Virus 4 Not Detected     Bordetella pertussis pcr Not Detected     Influenza A H1 2009 PCR Not Detected     Chlamydophila pneumoniae PCR Not Detected     Mycoplasma pneumo by PCR Not Detected     Influenza A PCR Not Detected     Influenza A H3 Not Detected     Influenza A H1 Not Detected     RSV, PCR Not Detected    Comprehensive Metabolic Panel [[id number]]  (Abnormal) Collected:  10/14/19 1647    Specimen:  Blood Updated:  10/14/19 2105     Glucose 142 mg/dL      BUN 71 mg/dL      Creatinine 3.80 mg/dL      Sodium 144 mmol/L      Potassium 5.2 mmol/L      Chloride 102 mmol/L      CO2 14.0 mmol/L      Calcium 8.2 mg/dL      Total Protein 6.2 g/dL      Albumin 3.50 g/dL      ALT (SGPT) <5 U/L      AST (SGOT) 51 U/L      Alkaline Phosphatase 97 U/L      Total Bilirubin 2.3 mg/dL      eGFR Non African Amer 11 mL/min/1.73      Comment: <15 Indicative of kidney failure.        eGFR   Amer --     Comment: <15 Indicative of kidney failure.        Globulin 2.7 gm/dL      A/G Ratio 1.3 g/dL      BUN/Creatinine Ratio 18.7     Anion Gap  33.2 mmol/L     Narrative:       The MDRD GFR formula is only valid for adults with stable renal function between ages 18 and 70.    MRSA Screen Culture - Swab, Nares [[id number]] Collected:  10/14/19 2036    Specimen:  Swab from Nares Updated:  10/14/19 2039    Comprehensive Metabolic Panel [[id number]]  (Abnormal) Collected:  10/14/19 1619    Specimen:  Blood Updated:  10/14/19 2017     Glucose 152 mg/dL      BUN 71 mg/dL      Creatinine 4.00 mg/dL      Sodium 142 mmol/L      Potassium 5.2 mmol/L      Chloride 103 mmol/L      CO2 12.0 mmol/L      Calcium 8.0 mg/dL      Total Protein 6.4 g/dL      Albumin 3.50 g/dL      ALT (SGPT) <5 U/L      AST (SGOT) 51 U/L      Alkaline Phosphatase 97 U/L      Total Bilirubin 2.1 mg/dL      eGFR Non African Amer 10 mL/min/1.73      Comment: <15 Indicative of kidney failure.        eGFR   Amer --     Comment: <15 Indicative of kidney failure.        Globulin 2.9 gm/dL      A/G Ratio 1.2 g/dL      BUN/Creatinine Ratio 17.8     Anion Gap 32.2 mmol/L     Narrative:       The MDRD GFR formula is only valid for adults with stable renal function between ages 18 and 70.    Urinalysis With Culture If Indicated - Urine, Catheter [[id number]]  (Abnormal) Collected:  10/14/19 1929    Specimen:  Urine, Catheter Updated:  10/14/19 2015     Color, UA Dark Yellow     Appearance, UA Turbid     Comment: Result checked         pH, UA 5.5     Specific Gravity, UA 1.016     Glucose, UA Negative     Ketones, UA Trace     Bilirubin, UA Small (1+)     Comment: Confirmation testing is unavailable.  A serum bilirubin is recommended for further assessment.        Blood, UA Moderate (2+)     Protein, UA 30 mg/dL (1+)     Leuk Esterase, UA Large (3+)     Nitrite, UA Negative     Urobilinogen, UA 0.2 E.U./dL    Urinalysis, Microscopic Only - Urine, Catheter [[id number]]  (Abnormal) Collected:  10/14/19 1929    Specimen:  Urine, Catheter Updated:  10/14/19 2015     RBC, UA 3-5 /HPF      WBC, UA Too  Numerous to Count /HPF      Bacteria, UA 3+ /HPF      Squamous Epithelial Cells, UA 3-6 /HPF      Hyaline Casts, UA 3-6 /LPF      Granular Casts, UA 0-2 /LPF      Methodology Manual Light Microscopy    Urine Culture - Urine, Urine, Catheter [[id number]] Collected:  10/14/19 1929    Specimen:  Urine, Catheter Updated:  10/14/19 2015    POC Lactate [[id number]]  (Abnormal) Collected:  10/14/19 1910    Specimen:  Blood Updated:  10/14/19 1912     Lactate 9.6 mmol/L      Comment: Serial Number: [id number]:  [id number]       Magnesium [[id number]]  (Abnormal) Collected:  10/14/19 1619    Specimen:  Blood Updated:  10/14/19 1857     Magnesium 3.4 mg/dL     Phosphorus [[id number]]  (Abnormal) Collected:  10/14/19 1619    Specimen:  Blood Updated:  10/14/19 1857     Phosphorus 6.5 mg/dL     C-reactive Protein [[id number]]  (Abnormal) Collected:  10/14/19 1619    Specimen:  Blood Updated:  10/14/19 1857     C-Reactive Protein 11.81 mg/dL     Calcium, Ionized [[id number]]  (Abnormal) Collected:  10/14/19 1647    Specimen:  Blood Updated:  10/14/19 1808     Ionized Calcium 1.02 mmol/L     Light Blue Top [[id number]] Collected:  10/14/19 1619    Specimen:  Blood Updated:  10/14/19 1800     Extra Tube hold for add-on     Comment: Auto resulted       Green Top (Gel) [[id number]] Collected:  10/14/19 1619    Specimen:  Blood Updated:  10/14/19 1800     Extra Tube Hold for add-ons.     Comment: Auto resulted.       Gold Top - SST [[id number]] Collected:  10/14/19 1647    Specimen:  Blood Updated:  10/14/19 1800     Extra Tube Hold for add-ons.     Comment: Auto resulted.       Troponin [[id number]]  (Abnormal) Collected:  10/14/19 1620    Specimen:  Blood Updated:  10/14/19 1742     Troponin I 0.340 ng/mL     Narrative:       Troponin I Reference Range:    0.00-0.03  Negative.  Repeat testing in 4-6 hours if clinically indicated.    0.04-0.29  Suspicious for myocardial injury. Serial measurements and clinical  correlation may be  necessary to confirm or exclude diagnosis of acute  coronary syndrome.  Repeat testing in 4-6 hours if indicated.     >0.29 Consistent with myocardial injury.  Recommend clinical and laboratory correlation.     Results my be falsely decreased if patient taking Biotin.     CBC & Differential [[id number]] Collected:  10/14/19 1620    Specimen:  Blood Updated:  10/14/19 1727    Narrative:       The following orders were created for panel order CBC & Differential.  Procedure                               Abnormality         Status                     ---------                               -----------         ------                     CBC Auto Differential[[id number]]        Abnormal            Final result                 Please view results for these tests on the individual orders.    CBC Auto Differential [[id number]]  (Abnormal) Collected:  10/14/19 1620    Specimen:  Blood Updated:  10/14/19 1727     WBC 18.80 10*3/mm3      RBC 4.29 10*6/mm3      Hemoglobin 13.1 g/dL      Hematocrit 44.9 %      .8 fL      MCH 30.7 pg      MCHC 29.3 g/dL      RDW 19.3 %      RDW-SD 71.8 fl      MPV 9.4 fL      Platelets 210 10*3/mm3      Neutrophil % 88.0 %      Lymphocyte % 3.7 %      Monocyte % 8.1 %      Eosinophil % 0.0 %      Basophil % 0.2 %      Neutrophils, Absolute 16.50 10*3/mm3      Lymphocytes, Absolute 0.70 10*3/mm3      Monocytes, Absolute 1.50 10*3/mm3      Eosinophils, Absolute 0.00 10*3/mm3      Basophils, Absolute 0.00 10*3/mm3      nRBC 0.5 /100 WBC     Blood Culture - Blood, Blood, Venous Line [[id number]] Collected:  10/14/19 1716    Specimen:  Blood, Venous Line Updated:  10/14/19 1724    Protime-INR [[id number]]  (Abnormal) Collected:  10/14/19 1619    Specimen:  Blood Updated:  10/14/19 1723     Protime 23.1 Seconds      INR 2.44    aPTT [[id number]]  (Normal) Collected:  10/14/19 1619    Specimen:  Blood Updated:  10/14/19 1723     PTT 26.2 seconds     BNP [[id number]]  (Abnormal) Collected:  10/14/19 1619     Specimen:  Blood Updated:  10/14/19 1722     BNP 1,784.0 pg/mL      Comment: Results may be falsely decreased if patient taking Biotin.       POC Lactate [[id number]]  (Abnormal) Collected:  10/14/19 1654    Specimen:  Blood Updated:  10/14/19 1657     Lactate 9.2 mmol/L      Comment: Serial Number: [id number]:  [id number]       POC Lactate [[id number]]  (Abnormal) Collected:  10/14/19 1653    Specimen:  Blood Updated:  10/14/19 1657     Lactate 10.0 mmol/L      Comment: Serial Number: [id number]:  [id number]       POC Lactate [[id number]]  (Abnormal) Collected:  10/14/19 1651    Specimen:  Blood Updated:  10/14/19 1656     Lactate 9.1 mmol/L      Comment: Serial Number: [id number]:  [id number]       Lactic Acid, Reflex Timer (This will reflex a repeat order 3-3:15 hours after ordered.) [[id number]] Collected:  10/14/19 1651    Specimen:  Blood Updated:  10/14/19 1656    Blood Gas, Arterial [[id number]]  (Abnormal) Collected:  10/14/19 1646    Specimen:  Arterial Blood Updated:  10/14/19 1655     Site Left Brachial     John's Test N/A     pH, Arterial 7.185 pH units      pCO2, Arterial 31.9 mm Hg      pO2, Arterial 19.5 mm Hg      HCO3, Arterial 12.0 mmol/L      Base Excess, Arterial -15.1 mmol/L      Comment: Serial Number: [id number]:  [id number]        O2 Saturation, Arterial 21.8 %      CO2 Content 13.0 mmol/L      Barometric Pressure for Blood Gas --     Comment: N/A        Modality Cannula     FIO2 44 %      Hemodilution No    Blood Culture - Blood, Arm, Right [[id number]] Collected:  10/14/19 1647    Specimen:  Blood from Arm, Right Updated:  10/14/19 1650          Imaging & Other Studies    Imaging Results (last 72 hours)     Procedure Component Value Units Date/Time    CT Abdomen Pelvis Without Contrast [[id number]] Collected:  10/14/19 1912     Updated:  10/14/19 1925    Narrative:          DATE OF EXAM:  10/14/2019 6:53 PM     PROCEDURE:  CT ABDOMEN PELVIS WO CONTRAST-     INDICATIONS:  pain;  A41.9-Sepsis, unspecified organism; R65.21-Severe sepsis with  septic shock     COMPARISON:  CT scan of the abdomen and pelvis 02/16/2019     TECHNIQUE:  Routine transaxial slices were obtained through the abdomen and pelvis  without the administration of intravenous contrast. Reconstructed  coronal and sagittal images were also obtained. Automated exposure  control and iterative construction methods were used.     FINDINGS:  There are moderate to large bilateral pleural effusions with associated  bibasilar dependent subsegmental atelectasis which are worsened from the  previous CT scan. There is a transvenous pacemaker. The liver and spleen  and adrenal glands and pancreas and kidneys appear normal. There is free  fluid deep in the pelvis unusual for a patient of this age. The patient  appears to have had a subtotal colectomy with only the rectum and distal  most aspect of the sigmoid colon remaining. There are scattered small  bowel air-fluid levels in nondistended loops. The small bowel within the  subcutaneous tissues passing from the abdominal wall to the skin does  show some redundancy however there is no significant small bowel  distention to suggest obstruction. There is mild diffuse increased soft  tissue stranding in the subcutaneous fat and mesenteric fat. There are  degenerative changes of the lumbar spine.       Impression:          1. Subtotal colectomy. There is an ileostomy in the right lower  quadrant. The subcutaneous portion of the ileal loop is slightly  redundant however there is no small bowel distention to suggest  obstruction. There are some scattered small bowel air-fluid levels in  nondistended loops which could reflect an associated mild ileus or  enteritis.  2. Moderate to large bilateral pleural effusions and dependent bibasilar  atelectasis. In addition there is a mild amount of ascites deep in the  pelvis and there is diffuse increased soft tissue stranding in the  subcutaneous fat and  mesenteric fat. The constellation of these findings  does raise concern for changes of congestive heart failure.  3. Incidental note is made of a 3 cm right lateral hernia containing  mesenteric fat.     Electronically Signed ByIsrael Carpenter On:10/14/2019 7:16 PM  This report was finalized on [id number] by  Ulysses Carpenter, .    XR Chest 1 View [[id number]] Collected:  10/14/19 1902     Updated:  10/14/19 1905    Narrative:       DATE OF EXAM:  10/14/2019 6:45 PM     PROCEDURE:  XR CHEST 1 VW-     INDICATIONS:  picc tip verification     COMPARISON: Chest x-ray 10/14/2019 at 5:00 PM     TECHNIQUE:   Single radiographic AP view of the chest was obtained.     FINDINGS:  There is been placement of a right PICC line with the tip in the  superior vena cava. There is cardiomegaly with a transvenous pacemaker.  There are extensive bilateral alveolar infiltrates and pleural effusions  with pulmonary vascular congestion which have worsened from the previous  study.        Impression:       Under  1. PICC line in good position with the tip in the superior vena cava.  2. Findings suggesting changes of pulmonary edema and congestive failure  which are worsened from the previous study.     Electronically Signed ByIsrael Carpenter On:10/14/2019 7:03 PM  This report was finalized on [id number] by  Ulysses Carpenter, .    XR Chest 1 View [[id number]] Collected:  10/14/19 1713     Updated:  10/14/19 1716    Narrative:       DATE OF EXAM:  10/14/2019 5:00 PM     PROCEDURE:  XR CHEST 1 VW-     INDICATIONS:  soa     COMPARISON:  Chest x-ray 02/11/2019     TECHNIQUE:   Single radiographic AP view of the chest was obtained.     FINDINGS:  There is cardiomegaly with a transvenous pacemaker. There is no  significant pulmonary vascular congestion however there are extensive  bibasilar areas of infiltrate and consolidation and effusion. There is a  possible PICC line on the right with the tip in the right axillary vein.           Impression:       Renomegaly. Basilar infiltrates and effusions. The appearance is  somewhat more suggestive of changes of pulmonary edema and congestive  failure. An underlying pneumonia cannot be excluded.     Electronically Signed By-Ulysses Carpenter On:10/14/2019 5:14 PM  This report was finalized on [id number] by  Ulysses Carpenter, .          Assessment/plan  Septic shock likely related to urosepsis with hx of ESBL E. Coli UTI  Acute hypoxic respiratory failure  Leukocytosis   Bilateral pleural effusions   Hyperkalemia  LEYLA  Atrial fib with RVR  Acute hypoxic respiratory failure  H/o Hypertension  H/o LLE DVT and PE  New onset CHF  Chronic anticoagulation with Eliquis  ?Ileus  H/o ileostomy r/t C. Diff  NSTEMI    Plan:    -cont Merrem and vanco (hx of ESBL UTI)  -Continue Precision Flow and wean as tolerated to maintain sats 94%  -CXR with pulmonary edema  -ABGs reviewed   -on AMIO gtt   -cont vasopressor of AREN, for MAP goal 65  -resp viral panel negative   -Urine cx pending  -blood cx pending   -TTE pendng  -troponin 0.43  -lactic acid 5.2  -replace electrolytes as needed   -IVF bolus of 1449 in the ER  -Insulin, calcium, D50 and bicarb for hyperkalemia, K+ 4.3  -Nephrology consulted  -on HCO3 gtt  -restart anticoagulation when appropriate, INR 3.14 (Eliquis home med)  -elevated LFTs, monitor - possible shock liver   -D/C IVFs   -Renal ultrasound negative  -Cardiology consulted   -Diureses per renal  -bedside swallow eval for diet  -PT eval    PICC    PUD: Famotidine  Insulin:  Sliding scale  VTE:  SCDs  Nutrition: NPO - swallow eval per RN    DNR    Attending physician statement:  Patient remains critically ill.  Total critical care time spent is 31 minutes which does not include any time for procedures.  Critical care time is exclusive of time spent by the nurse practitioner.  Above note scribed by nurse practitioner for me and later reviewed by me for accuracy . I've examined the patient and reviewed all  labs and images.  I have directly participated in the evaluation and management of this patient.  Amanda Kaba MD

## 2019-10-15 NOTE — H&P
Pulmonary/ Critical Care ADMISSION H&P Note        Patient Name:  Dinora Vásquez    :  1926    Medical Record:  [id number]    Primary Care Physician     Florentino Kimbrough MD [facility]  Dinora Vásquez is a 89 y.o. female who was presented to the ER after being seen earlier by Nephrology and found to be hypotensive and tachycardic; in addition she complained of dyspnea, abdominal pain and not feeling well.  She had a bolus of IVFs started and transferred to the ER.  Workup in the ER revealed afib with RVR, UTI, septic shock, CHF, STEMI, acute hypoxic respiratory failure and LEYLA.  She had IVFs per sepsis protocol given and started on Meropenem.  She was given a bolus of amio and Cardizem without improvement in her heart rate and subsequently developed hypotension.  She had a PICC ordered by the ER and was started on Levophed.   The patient is currently difficult to get a history from as she is obtunded.  Her sats were in the 70's and she was started on Precision Flow with improvement.      Review of Systems    Unable to obtain.        Home medicationS  Prior to Admission medications    Medication Sig Start Date End Date Taking? Authorizing Provider   cholecalciferol (VITAMIN D3) 1000 units tablet Daily. 17   Aravind Silva MD   Cranberry 600 MG tablet Take 600 mg by mouth 3 (Three) Times a Day.    Aravind Silva MD   ELIQUIS 2.5 MG tablet tablet Take 2.5 mg by mouth 2 (Two) Times a Day. 19   Aravind Silva MD   fluorometholone (FML) 0.1 % ophthalmic suspension Administer 1 Drop/kg to both eyes Daily. 19   Aravind Silva MD   levothyroxine (SYNTHROID, LEVOTHROID) 75 MCG tablet Every Morning Before Breakfast. 19   Aravind Silva MD   loratadine (CLARITIN) 10 MG tablet Take 10 mg by mouth Daily.    Aravind Silva MD   Magnesium 400 MG tablet MAGNESIUM 400 MG TABS 18   Aravind Silva MD   metoprolol tartrate (LOPRESSOR) 50 MG tablet  Take 50 mg by mouth 2 (Two) Times a Day. 6/11/19   Aravind Silva MD   mirtazapine (REMERON) 7.5 MG tablet 7.5 mg Daily. 6/18/19   Aravind Silva MD   polyvinyl alcohol (ARTIFICIAL TEARS) 1.4 % ophthalmic solution Every 12 (Twelve) Hours. 11/2/18   Aravind Silva MD   Prenatal w/o A Vit-Fe Fum-FA (BP MULTINATAL PLUS) 30-1 MG tablet Daily. 12/19/17   Aravind Silva MD   saccharomyces boulardii (FLORASTOR) 250 MG capsule FLORASTOR 250 MG CAPS 6/11/18   Aravind Silva MD   sertraline (ZOLOFT) 25 MG tablet Take 25 mg by mouth Daily. 6/11/19   Aravind Silva MD       Medical History    Past Medical History:   Diagnosis Date   • Ankle wound, right, initial encounter    • Aortic stenosis    • Atrial fibrillation (CMS/HCC)    • Coronary artery disease    • DVT (deep venous thrombosis) (CMS/HCC)    • Hypothyroidism    • Left bundle branch block    • Pulmonary embolus (CMS/HCC)    • Pulmonary hypertension (CMS/HCC)    • Sinus bradycardia         Surgical History    Past Surgical History:   Procedure Laterality Date   • COLOSTOMY     • ORIF ANKLE FRACTURE Right 2013    Right Ankle ORIF    • PACEMAKER IMPLANTATION  11/29/2017    Dual Chamber [employer]   • TOTAL ABDOMINAL HYSTERECTOMY          Family History    Family History   Problem Relation Age of Onset   • Heart disease Mother    • Diabetes Mother    • Heart disease Sister    • Diabetes Sister        Social History    Social History     Tobacco Use   • Smoking status: Never Smoker   • Smokeless tobacco: Never Used   Substance Use Topics   • Alcohol use: No     Frequency: Never        Allergies    Allergies   Allergen Reactions   • Cephalexin Swelling   • Sulfadiazine Rash   • Trimethoprim Hives   • Trospium Hives       Medications    Scheduled Meds:  amiodarone      sodium chloride 10 mL Intravenous Q12H     Continuous Infusions:  diltiaZEM 5 mg/hr Last Rate: 5 mg/hr (10/14/19 1700)   norepinephrine 0.02-0.3 mcg/kg/min Last  Rate: 0.1 mcg/kg/min (10/14/19 1941)   phenylephrine 0.5-3 mcg/kg/min    sodium chloride 75 mL/hr Last Rate: 75 mL/hr (10/14/19 2045)     PRN Meds:.sodium chloride  •  sodium chloride      Physical Exam    tMax 24 hrs:  Temp (24hrs), Av.8 °F (36.6 °C), Min:97.8 °F (36.6 °C), Max:97.8 °F (36.6 °C)    Vitals Ranges:  Temp:  [97.8 °F (36.6 °C)] 97.8 °F (36.6 °C)  Heart Rate:  [116-174] 131  Resp:  [16-29] 29  BP: ()/() 104/52  Intake and Output Last 3 Shifts:  I/O last 3 completed shifts:  In: 1449 [IV Piggyback:1449]  Out: -     Constitutional:  Obtunded.  No acute respiratory distress   HEENT:  Atraumatic, PERRL, conjunctiva normal, moist oral mucosa, no nasal discharge.  Trachea is midline.  Respiratory:  No respiratory distress.  Seen wearing high flow/supplemental O2.  Diminished breath sounds bilaterally.    Cardiovascular:  Irregular, tachy.   Pulses 2+ and equal in all four extremities.    GI:  Soft, nondistended.  Nontender to palpation.   Ileostomy  with stool output noted.   Extremities: No edema, cyanosis or tenderness.  Integument:  No rashes.   Neurologic:  Obtunded.  Moves all four extremities to painful stimuli.   No focal neurologic deficits observed.      labs    Lab Results (last 24 hours)     Procedure Component Value Units Date/Time    MRSA Screen Culture - Swab, Nares [[id number]] Collected:  10/14/19 2036    Specimen:  Swab from Nares Updated:  10/14/19 2039    Comprehensive Metabolic Panel [[id number]] Collected:  10/14/19 1647    Specimen:  Blood Updated:  10/14/19 2036    Comprehensive Metabolic Panel [[id number]]  (Abnormal) Collected:  10/14/19 1619    Specimen:  Blood Updated:  10/14/19 2017     Glucose 152 mg/dL      BUN 71 mg/dL      Creatinine 4.00 mg/dL      Sodium 142 mmol/L      Potassium 5.2 mmol/L      Chloride 103 mmol/L      CO2 12.0 mmol/L      Calcium 8.0 mg/dL      Total Protein 6.4 g/dL      Albumin 3.50 g/dL      ALT (SGPT) <5 U/L      AST (SGOT) 51 U/L       Alkaline Phosphatase 97 U/L      Total Bilirubin 2.1 mg/dL      eGFR Non African Amer 10 mL/min/1.73      Comment: <15 Indicative of kidney failure.        eGFR   Amer --     Comment: <15 Indicative of kidney failure.        Globulin 2.9 gm/dL      A/G Ratio 1.2 g/dL      BUN/Creatinine Ratio 17.8     Anion Gap 32.2 mmol/L     Narrative:       The MDRD GFR formula is only valid for adults with stable renal function between ages 18 and 70.    Urinalysis With Culture If Indicated - Urine, Catheter [[id number]]  (Abnormal) Collected:  10/14/19 1929    Specimen:  Urine, Catheter Updated:  10/14/19 2015     Color, UA Dark Yellow     Appearance, UA Turbid     Comment: Result checked         pH, UA 5.5     Specific Gravity, UA 1.016     Glucose, UA Negative     Ketones, UA Trace     Bilirubin, UA Small (1+)     Comment: Confirmation testing is unavailable.  A serum bilirubin is recommended for further assessment.        Blood, UA Moderate (2+)     Protein, UA 30 mg/dL (1+)     Leuk Esterase, UA Large (3+)     Nitrite, UA Negative     Urobilinogen, UA 0.2 E.U./dL    Urinalysis, Microscopic Only - Urine, Catheter [[id number]]  (Abnormal) Collected:  10/14/19 1929    Specimen:  Urine, Catheter Updated:  10/14/19 2015     RBC, UA 3-5 /HPF      WBC, UA Too Numerous to Count /HPF      Bacteria, UA 3+ /HPF      Squamous Epithelial Cells, UA 3-6 /HPF      Hyaline Casts, UA 3-6 /LPF      Granular Casts, UA 0-2 /LPF      Methodology Manual Light Microscopy    Urine Culture - Urine, Urine, Catheter [[id number]] Collected:  10/14/19 1929    Specimen:  Urine, Catheter Updated:  10/14/19 2015    Respiratory Panel, PCR - Swab, Nasopharynx [[id number]] Collected:  10/14/19 1951    Specimen:  Swab from Nasopharynx Updated:  10/14/19 2003    POC Lactate [[id number]]  (Abnormal) Collected:  10/14/19 1910    Specimen:  Blood Updated:  10/14/19 1912     Lactate 9.6 mmol/L      Comment: Serial Number: [id number]:  [id number]        Magnesium [[id number]]  (Abnormal) Collected:  10/14/19 1619    Specimen:  Blood Updated:  10/14/19 1857     Magnesium 3.4 mg/dL     Phosphorus [[id number]]  (Abnormal) Collected:  10/14/19 1619    Specimen:  Blood Updated:  10/14/19 1857     Phosphorus 6.5 mg/dL     C-reactive Protein [[id number]]  (Abnormal) Collected:  10/14/19 1619    Specimen:  Blood Updated:  10/14/19 1857     C-Reactive Protein 11.81 mg/dL     Calcium, Ionized [[id number]]  (Abnormal) Collected:  10/14/19 1647    Specimen:  Blood Updated:  10/14/19 1808     Ionized Calcium 1.02 mmol/L     [place] Draw [[id number]] Collected:  10/14/19 1619    Specimen:  Blood Updated:  10/14/19 1800    Narrative:       The following orders were created for panel order [place] Draw.  Procedure                               Abnormality         Status                     ---------                               -----------         ------                     Light Blue Top[[id number]]                                   Final result               Green Top (Gel)[[id number]]                                  Final result               Lavender Top[[id number]]                                                                Gold Top - SST[[id number]]                                   Final result                 Please view results for these tests on the individual orders.    Light Blue Top [[id number]] Collected:  10/14/19 1619    Specimen:  Blood Updated:  10/14/19 1800     Extra Tube hold for add-on     Comment: Auto resulted       Green Top (Gel) [[id number]] Collected:  10/14/19 1619    Specimen:  Blood Updated:  10/14/19 1800     Extra Tube Hold for add-ons.     Comment: Auto resulted.       Gold Top - SST [[id number]] Collected:  10/14/19 1647    Specimen:  Blood Updated:  10/14/19 1800     Extra Tube Hold for add-ons.     Comment: Auto resulted.       Troponin [[id number]]  (Abnormal) Collected:  10/14/19 1620    Specimen:  Blood Updated:  10/14/19 1742     Troponin I 0.340  ng/mL     Narrative:       Troponin I Reference Range:    0.00-0.03  Negative.  Repeat testing in 4-6 hours if clinically indicated.    0.04-0.29  Suspicious for myocardial injury. Serial measurements and clinical  correlation may be necessary to confirm or exclude diagnosis of acute  coronary syndrome.  Repeat testing in 4-6 hours if indicated.     >0.29 Consistent with myocardial injury.  Recommend clinical and laboratory correlation.     Results my be falsely decreased if patient taking Biotin.     CBC & Differential [[id number]] Collected:  10/14/19 1620    Specimen:  Blood Updated:  10/14/19 1727    Narrative:       The following orders were created for panel order CBC & Differential.  Procedure                               Abnormality         Status                     ---------                               -----------         ------                     CBC Auto Differential[[id number]]        Abnormal            Final result                 Please view results for these tests on the individual orders.    CBC Auto Differential [[id number]]  (Abnormal) Collected:  10/14/19 1620    Specimen:  Blood Updated:  10/14/19 1727     WBC 18.80 10*3/mm3      RBC 4.29 10*6/mm3      Hemoglobin 13.1 g/dL      Hematocrit 44.9 %      .8 fL      MCH 30.7 pg      MCHC 29.3 g/dL      RDW 19.3 %      RDW-SD 71.8 fl      MPV 9.4 fL      Platelets 210 10*3/mm3      Neutrophil % 88.0 %      Lymphocyte % 3.7 %      Monocyte % 8.1 %      Eosinophil % 0.0 %      Basophil % 0.2 %      Neutrophils, Absolute 16.50 10*3/mm3      Lymphocytes, Absolute 0.70 10*3/mm3      Monocytes, Absolute 1.50 10*3/mm3      Eosinophils, Absolute 0.00 10*3/mm3      Basophils, Absolute 0.00 10*3/mm3      nRBC 0.5 /100 WBC     Blood Culture - Blood, Blood, Venous Line [[id number]] Collected:  10/14/19 1716    Specimen:  Blood, Venous Line Updated:  10/14/19 1724    Protime-INR [[id number]]  (Abnormal) Collected:  10/14/19 1619    Specimen:  Blood Updated:   10/14/19 1723     Protime 23.1 Seconds      INR 2.44    aPTT [[id number]]  (Normal) Collected:  10/14/19 1619    Specimen:  Blood Updated:  10/14/19 1723     PTT 26.2 seconds     BNP [[id number]]  (Abnormal) Collected:  10/14/19 1619    Specimen:  Blood Updated:  10/14/19 1722     BNP 1,784.0 pg/mL      Comment: Results may be falsely decreased if patient taking Biotin.       POC Lactate [[id number]]  (Abnormal) Collected:  10/14/19 1654    Specimen:  Blood Updated:  10/14/19 1657     Lactate 9.2 mmol/L      Comment: Serial Number: [id number]:  [id number]       POC Lactate [[id number]]  (Abnormal) Collected:  10/14/19 1653    Specimen:  Blood Updated:  10/14/19 1657     Lactate 10.0 mmol/L      Comment: Serial Number: [id number]:  [id number]       POC Lactate [[id number]]  (Abnormal) Collected:  10/14/19 1651    Specimen:  Blood Updated:  10/14/19 1656     Lactate 9.1 mmol/L      Comment: Serial Number: [id number]:  [id number]       Lactic Acid, Reflex Timer (This will reflex a repeat order 3-3:15 hours after ordered.) [[id number]] Collected:  10/14/19 1651    Specimen:  Blood Updated:  10/14/19 1656    Blood Gas, Arterial [[id number]]  (Abnormal) Collected:  10/14/19 1646    Specimen:  Arterial Blood Updated:  10/14/19 1655     Site Left Brachial     John's Test N/A     pH, Arterial 7.185 pH units      pCO2, Arterial 31.9 mm Hg      pO2, Arterial 19.5 mm Hg      HCO3, Arterial 12.0 mmol/L      Base Excess, Arterial -15.1 mmol/L      Comment: Serial Number: [id number]:  [id number]        O2 Saturation, Arterial 21.8 %      CO2 Content 13.0 mmol/L      Barometric Pressure for Blood Gas --     Comment: N/A        Modality Cannula     FIO2 44 %      Hemodilution No    Blood Culture - Blood, Arm, Right [[id number]] Collected:  10/14/19 1647    Specimen:  Blood from Arm, Right Updated:  10/14/19 1650          Imaging & Other Studies    Imaging Results (last 72 hours)     Procedure Component Value Units Date/Time     CT Abdomen Pelvis Without Contrast [[id number]] Collected:  10/14/19 1912     Updated:  10/14/19 1925    Narrative:          DATE OF EXAM:  10/14/2019 6:53 PM     PROCEDURE:  CT ABDOMEN PELVIS WO CONTRAST-     INDICATIONS:  pain; A41.9-Sepsis, unspecified organism; R65.21-Severe sepsis with  septic shock     COMPARISON:  CT scan of the abdomen and pelvis 02/16/2019     TECHNIQUE:  Routine transaxial slices were obtained through the abdomen and pelvis  without the administration of intravenous contrast. Reconstructed  coronal and sagittal images were also obtained. Automated exposure  control and iterative construction methods were used.     FINDINGS:  There are moderate to large bilateral pleural effusions with associated  bibasilar dependent subsegmental atelectasis which are worsened from the  previous CT scan. There is a transvenous pacemaker. The liver and spleen  and adrenal glands and pancreas and kidneys appear normal. There is free  fluid deep in the pelvis unusual for a patient of this age. The patient  appears to have had a subtotal colectomy with only the rectum and distal  most aspect of the sigmoid colon remaining. There are scattered small  bowel air-fluid levels in nondistended loops. The small bowel within the  subcutaneous tissues passing from the abdominal wall to the skin does  show some redundancy however there is no significant small bowel  distention to suggest obstruction. There is mild diffuse increased soft  tissue stranding in the subcutaneous fat and mesenteric fat. There are  degenerative changes of the lumbar spine.       Impression:          1. Subtotal colectomy. There is an ileostomy in the right lower  quadrant. The subcutaneous portion of the ileal loop is slightly  redundant however there is no small bowel distention to suggest  obstruction. There are some scattered small bowel air-fluid levels in  nondistended loops which could reflect an associated mild ileus or  enteritis.  2.  Moderate to large bilateral pleural effusions and dependent bibasilar  atelectasis. In addition there is a mild amount of ascites deep in the  pelvis and there is diffuse increased soft tissue stranding in the  subcutaneous fat and mesenteric fat. The constellation of these findings  does raise concern for changes of congestive heart failure.  3. Incidental note is made of a 3 cm right lateral hernia containing  mesenteric fat.     Electronically Signed ByIsrael Carpenter On:10/14/2019 7:16 PM  This report was finalized on [id number] by  Ulysses Carpenter, .    XR Chest 1 View [[id number]] Collected:  10/14/19 1902     Updated:  10/14/19 1905    Narrative:       DATE OF EXAM:  10/14/2019 6:45 PM     PROCEDURE:  XR CHEST 1 VW-     INDICATIONS:  picc tip verification     COMPARISON: Chest x-ray 10/14/2019 at 5:00 PM     TECHNIQUE:   Single radiographic AP view of the chest was obtained.     FINDINGS:  There is been placement of a right PICC line with the tip in the  superior vena cava. There is cardiomegaly with a transvenous pacemaker.  There are extensive bilateral alveolar infiltrates and pleural effusions  with pulmonary vascular congestion which have worsened from the previous  study.        Impression:       Under  1. PICC line in good position with the tip in the superior vena cava.  2. Findings suggesting changes of pulmonary edema and congestive failure  which are worsened from the previous study.     Electronically Signed ByIsrael Carpenter On:10/14/2019 7:03 PM  This report was finalized on [id number] by  Ulysses Carpenter, .    XR Chest 1 View [[id number]] Collected:  10/14/19 1713     Updated:  10/14/19 1716    Narrative:       DATE OF EXAM:  10/14/2019 5:00 PM     PROCEDURE:  XR CHEST 1 VW-     INDICATIONS:  soa     COMPARISON:  Chest x-ray 02/11/2019     TECHNIQUE:   Single radiographic AP view of the chest was obtained.     FINDINGS:  There is cardiomegaly with a transvenous pacemaker. There is  no  significant pulmonary vascular congestion however there are extensive  bibasilar areas of infiltrate and consolidation and effusion. There is a  possible PICC line on the right with the tip in the right axillary vein.          Impression:       Renomegaly. Basilar infiltrates and effusions. The appearance is  somewhat more suggestive of changes of pulmonary edema and congestive  failure. An underlying pneumonia cannot be excluded.     Electronically Signed By-Ulysses Carpenter On:10/14/2019 5:14 PM  This report was finalized on [id number] by  Ulysses Carpenter, .          Assessment/plan  Septic shock likely related to urosepsis with hx of ESBL E. Coli UTI  Acute hypoxic respiratory failure  Leukocytosis   Bilateral pleural effusions   Hyperkalemia  LEYLA  Atrial fib with RVR  Acute hypoxic respiratory failure  H/o Hypertension  H/o LLE DVT and PE  New onset CHF  Chronic anticoagulation with Eliquis  ?Ileus  H/o ileostomy r/t C. Diff  NSTEMI    Plan:    Continue Meropenum and add Vanc  Continue Precision Flow and wean as tolerated to maintain sats ?94%  Pan culture pending  TTE ordered  Trend troponin  Trend lactic aci  D/c Cardizem and start on Amio gtt  D/C Levophed and switch to [place]an  Received IVF bolus of 1449 in the ER  Cautious IVFs and monitor renal function and urine output  Insulin, calcium, D50 and bicarb for hyperkalemia and repeat BMP at midnight  ABG pending  Monitor renal function and urine output   Nephrology consulted  Restart anticoagulation when appropriate, INR 2.44 currently, repeat in the a.m.   Renal ultrasound  Cardiology consulted   Diurese when hemodynamically stable    PUD: Famotidine  Insulin:  Sliding scale  VTE:  SCDs  Nutrition: NPO              BAYLEE Soliz Dr.,  ICU attending on call, aware of ICU admission and agrees with plan of care    Critical care time 35 minutes excluding time for proceduers

## 2019-10-15 NOTE — PAYOR COMM NOTE
"  [facility]  NPI # [id number]  TID # [id number]      RETURN CONTACT  [facility] /    PH: [phone]  FX: [phone]  ===========================    REFERENCE #  Pending-   ===========================  REQUEST FOR INPATIENT AUTHORIZATION  ===========================  Please respond to above contact. Thank you.     ===========================    Dinora Hdez (89 y.o. Female)     Date of Birth Social Security Number Address Home Phone MRN    06/18/1926  [id number] [address] [phone] [id number]    Catholic Marital Status          None        Admission Date Admission Type Admitting Provider Attending Provider Department, Room/Bed    10/14/19 Emergency Amanda Kaba MD Khan, Zaka Urrehman, MD [facility] INTENSIVE CARE UNIT, 2309/1    Discharge Date Discharge Disposition Discharge Destination                       Attending Provider:  Amanda Kaba MD    Allergies:  Cephalexin, Sulfadiazine, Trimethoprim, Trospium    Isolation:  None   Infection:  None   Code Status:  No CPR    Ht:  160 cm (63\")   Wt:  57.7 kg (127 lb 3.3 oz)    Admission Cmt:  None   Principal Problem:  None                Active Insurance as of 10/14/2019     Primary Coverage     Payor Plan Insurance Group Employer/Plan Group    ANTHEM MEDICARE REPLACEMENT ANTHEM MEDICARE ADVANTAGE [id number]     Payor Plan Address Payor Plan Phone Number Payor Plan Fax Number Effective Dates    [address] [phone]  1/1/2015 - None Entered    [facility] [phone]       Subscriber Name Subscriber Birth Date Member ID       DINORA HDEZ 6/18/1926 [id number]                 Emergency Contacts      (Rel.) Home Phone Work Phone Mobile Phone    Contact, No [phone] -- --        UR REVIEW   ADMISSION REVIEW    [facility] > Infectious Disease >Sepsis and Other Febrile Illness, without Focal Infection " (M-160)      Hypoxemia as indicated by 1 or more of the following(1):  • Patient without baseline need for supplemental oxygen with oxygen saturation (SaO2) of less than 90% or arterial blood gas partial pressure of oxygen (PO2) of less than 60 mm Hg (8.0 kPa) on room air[A]    Verified inpt orders 10/14  1928    DX- SEPSIS / SEPTIC SHOCK / AFIB RVR / HYPOTENSION    ==============================       History & Physical      Jim Renae APRN at 10/14/19 2050          Pulmonary/ Critical Care ADMISSION H&P Note        Patient Name:  Dinora Vásquez    :  1926    Medical Record:  [id number]    Primary Care Physician     Florentino Kimbrough MD [facility]  Dniora Vásquez is a 89 y.o. female who was presented to the ER after being seen earlier by Nephrology and found to be hypotensive and tachycardic; in addition she complained of dyspnea, abdominal pain and not feeling well.  She had a bolus of IVFs started and transferred to the ER.  Workup in the ER revealed afib with RVR, UTI, septic shock, CHF, STEMI, acute hypoxic respiratory failure and LEYLA.  She had IVFs per sepsis protocol given and started on Meropenem.  She was given a bolus of amio and Cardizem without improvement in her heart rate and subsequently developed hypotension.  She had a PICC ordered by the ER and was started on Levophed.   The patient is currently difficult to get a history from as she is obtunded.  Her sats were in the 70's and she was started on Precision Flow with improvement.      Review of Systems    Unable to obtain.        Home medicationS  Prior to Admission medications    Medication Sig Start Date End Date Taking? Authorizing Provider   cholecalciferol (VITAMIN D3) 1000 units tablet Daily. 17   Aravind Silva MD   Cranberry 600 MG tablet Take 600 mg by mouth 3 (Three) Times a Day.    Aravind Silva MD   ELIQUIS 2.5 MG tablet tablet Take 2.5 mg by mouth 2 (Two) Times a Day. 19   Shira  MD Aravind   fluorometholone (FML) 0.1 % ophthalmic suspension Administer 1 Drop/kg to both eyes Daily. 5/21/19   Aravind Silva MD   levothyroxine (SYNTHROID, LEVOTHROID) 75 MCG tablet Every Morning Before Breakfast. 6/11/19   Aravind Silva MD   loratadine (CLARITIN) 10 MG tablet Take 10 mg by mouth Daily.    Aravind Silva MD   Magnesium 400 MG tablet MAGNESIUM 400 MG TABS 11/2/18   Aravind Silva MD   metoprolol tartrate (LOPRESSOR) 50 MG tablet Take 50 mg by mouth 2 (Two) Times a Day. 6/11/19   Aravind Silva MD   mirtazapine (REMERON) 7.5 MG tablet 7.5 mg Daily. 6/18/19   Aravind Silva MD   polyvinyl alcohol (ARTIFICIAL TEARS) 1.4 % ophthalmic solution Every 12 (Twelve) Hours. 11/2/18   Aravind Silva MD   Prenatal w/o A Vit-Fe Fum-FA (BP MULTINATAL PLUS) 30-1 MG tablet Daily. 12/19/17   Aravind Silva MD   saccharomyces boulardii (FLORASTOR) 250 MG capsule FLORASTOR 250 MG CAPS 6/11/18   Aravind Silva MD   sertraline (ZOLOFT) 25 MG tablet Take 25 mg by mouth Daily. 6/11/19   Aravind Silva MD       Medical History    Past Medical History:   Diagnosis Date   • Ankle wound, right, initial encounter    • Aortic stenosis    • Atrial fibrillation (CMS/HCC)    • Coronary artery disease    • DVT (deep venous thrombosis) (CMS/HCC)    • Hypothyroidism    • Left bundle branch block    • Pulmonary embolus (CMS/HCC)    • Pulmonary hypertension (CMS/HCC)    • Sinus bradycardia         Surgical History    Past Surgical History:   Procedure Laterality Date   • COLOSTOMY     • ORIF ANKLE FRACTURE Right 2013    Right Ankle ORIF    • PACEMAKER IMPLANTATION  11/29/2017    Dual Chamber [employer]   • TOTAL ABDOMINAL HYSTERECTOMY          Family History    Family History   Problem Relation Age of Onset   • Heart disease Mother    • Diabetes Mother    • Heart disease Sister    • Diabetes Sister        Social History    Social History     Tobacco Use    • Smoking status: Never Smoker   • Smokeless tobacco: Never Used   Substance Use Topics   • Alcohol use: No     Frequency: Never        Allergies    Allergies   Allergen Reactions   • Cephalexin Swelling   • Sulfadiazine Rash   • Trimethoprim Hives   • Trospium Hives       Medications    Scheduled Meds:  amiodarone      sodium chloride 10 mL Intravenous Q12H     Continuous Infusions:  diltiaZEM 5 mg/hr Last Rate: 5 mg/hr (10/14/19 170)   norepinephrine 0.02-0.3 mcg/kg/min Last Rate: 0.1 mcg/kg/min (10/14/19 1941)   phenylephrine 0.5-3 mcg/kg/min    sodium chloride 75 mL/hr Last Rate: 75 mL/hr (10/14/19 2045)     PRN Meds:.sodium chloride  •  sodium chloride      Physical Exam    tMax 24 hrs:  Temp (24hrs), Av.8 °F (36.6 °C), Min:97.8 °F (36.6 °C), Max:97.8 °F (36.6 °C)    Vitals Ranges:  Temp:  [97.8 °F (36.6 °C)] 97.8 °F (36.6 °C)  Heart Rate:  [116-174] 131  Resp:  [16-29] 29  BP: ()/() 104/52  Intake and Output Last 3 Shifts:  I/O last 3 completed shifts:  In: 1449 [IV Piggyback:1449]  Out: -     Constitutional:  Obtunded.  No acute respiratory distress   HEENT:  Atraumatic, PERRL, conjunctiva normal, moist oral mucosa, no nasal discharge.  Trachea is midline.  Respiratory:  No respiratory distress.  Seen wearing high flow/supplemental O2.  Diminished breath sounds bilaterally.    Cardiovascular:  Irregular, tachy.   Pulses 2+ and equal in all four extremities.    GI:  Soft, nondistended.  Nontender to palpation.   Ileostomy  with stool output noted.   Extremities: No edema, cyanosis or tenderness.  Integument:  No rashes.   Neurologic:  Obtunded.  Moves all four extremities to painful stimuli.   No focal neurologic deficits observed.      labs    Lab Results (last 24 hours)     Procedure Component Value Units Date/Time    MRSA Screen Culture - Swab, Nares [[id number]] Collected:  10/14/19 2036    Specimen:  Swab from Nares Updated:  10/14/19 2039    Comprehensive Metabolic Panel [[id number]]  Collected:  10/14/19 1647    Specimen:  Blood Updated:  10/14/19 2036    Comprehensive Metabolic Panel [[id number]]  (Abnormal) Collected:  10/14/19 1619    Specimen:  Blood Updated:  10/14/19 2017     Glucose 152 mg/dL      BUN 71 mg/dL      Creatinine 4.00 mg/dL      Sodium 142 mmol/L      Potassium 5.2 mmol/L      Chloride 103 mmol/L      CO2 12.0 mmol/L      Calcium 8.0 mg/dL      Total Protein 6.4 g/dL      Albumin 3.50 g/dL      ALT (SGPT) <5 U/L      AST (SGOT) 51 U/L      Alkaline Phosphatase 97 U/L      Total Bilirubin 2.1 mg/dL      eGFR Non African Amer 10 mL/min/1.73      Comment: <15 Indicative of kidney failure.        eGFR   Amer --     Comment: <15 Indicative of kidney failure.        Globulin 2.9 gm/dL      A/G Ratio 1.2 g/dL      BUN/Creatinine Ratio 17.8     Anion Gap 32.2 mmol/L     Narrative:       The MDRD GFR formula is only valid for adults with stable renal function between ages 18 and 70.    Urinalysis With Culture If Indicated - Urine, Catheter [[id number]]  (Abnormal) Collected:  10/14/19 1929    Specimen:  Urine, Catheter Updated:  10/14/19 2015     Color, UA Dark Yellow     Appearance, UA Turbid     Comment: Result checked         pH, UA 5.5     Specific Gravity, UA 1.016     Glucose, UA Negative     Ketones, UA Trace     Bilirubin, UA Small (1+)     Comment: Confirmation testing is unavailable.  A serum bilirubin is recommended for further assessment.        Blood, UA Moderate (2+)     Protein, UA 30 mg/dL (1+)     Leuk Esterase, UA Large (3+)     Nitrite, UA Negative     Urobilinogen, UA 0.2 E.U./dL    Urinalysis, Microscopic Only - Urine, Catheter [[id number]]  (Abnormal) Collected:  10/14/19 1929    Specimen:  Urine, Catheter Updated:  10/14/19 2015     RBC, UA 3-5 /HPF      WBC, UA Too Numerous to Count /HPF      Bacteria, UA 3+ /HPF      Squamous Epithelial Cells, UA 3-6 /HPF      Hyaline Casts, UA 3-6 /LPF      Granular Casts, UA 0-2 /LPF      Methodology Manual Light  Microscopy    Urine Culture - Urine, Urine, Catheter [[id number]] Collected:  10/14/19 1929    Specimen:  Urine, Catheter Updated:  10/14/19 2015    Respiratory Panel, PCR - Swab, Nasopharynx [[id number]] Collected:  10/14/19 1951    Specimen:  Swab from Nasopharynx Updated:  10/14/19 2003    POC Lactate [[id number]]  (Abnormal) Collected:  10/14/19 1910    Specimen:  Blood Updated:  10/14/19 1912     Lactate 9.6 mmol/L      Comment: Serial Number: [id number]:  [id number]       Magnesium [[id number]]  (Abnormal) Collected:  10/14/19 1619    Specimen:  Blood Updated:  10/14/19 1857     Magnesium 3.4 mg/dL     Phosphorus [[id number]]  (Abnormal) Collected:  10/14/19 1619    Specimen:  Blood Updated:  10/14/19 1857     Phosphorus 6.5 mg/dL     C-reactive Protein [[id number]]  (Abnormal) Collected:  10/14/19 1619    Specimen:  Blood Updated:  10/14/19 1857     C-Reactive Protein 11.81 mg/dL     Calcium, Ionized [[id number]]  (Abnormal) Collected:  10/14/19 1647    Specimen:  Blood Updated:  10/14/19 1808     Ionized Calcium 1.02 mmol/L     [place] Draw [[id number]] Collected:  10/14/19 1619    Specimen:  Blood Updated:  10/14/19 1800    Narrative:       The following orders were created for panel order [place] Draw.  Procedure                               Abnormality         Status                     ---------                               -----------         ------                     Light Blue Top[[id number]]                                   Final result               Green Top (Gel)[[id number]]                                  Final result               Lavender Top[[id number]]                                                                Gold Top - SST[[id number]]                                   Final result                 Please view results for these tests on the individual orders.    Light Blue Top [[id number]] Collected:  10/14/19 1619    Specimen:  Blood Updated:  10/14/19 1800     Extra Tube hold for add-on      Comment: Auto resulted       Green Top (Gel) [[id number]] Collected:  10/14/19 1619    Specimen:  Blood Updated:  10/14/19 1800     Extra Tube Hold for add-ons.     Comment: Auto resulted.       Gold Top - SST [[id number]] Collected:  10/14/19 1647    Specimen:  Blood Updated:  10/14/19 1800     Extra Tube Hold for add-ons.     Comment: Auto resulted.       Troponin [[id number]]  (Abnormal) Collected:  10/14/19 1620    Specimen:  Blood Updated:  10/14/19 1742     Troponin I 0.340 ng/mL     Narrative:       Troponin I Reference Range:    0.00-0.03  Negative.  Repeat testing in 4-6 hours if clinically indicated.    0.04-0.29  Suspicious for myocardial injury. Serial measurements and clinical  correlation may be necessary to confirm or exclude diagnosis of acute  coronary syndrome.  Repeat testing in 4-6 hours if indicated.     >0.29 Consistent with myocardial injury.  Recommend clinical and laboratory correlation.     Results my be falsely decreased if patient taking Biotin.     CBC & Differential [[id number]] Collected:  10/14/19 1620    Specimen:  Blood Updated:  10/14/19 1727    Narrative:       The following orders were created for panel order CBC & Differential.  Procedure                               Abnormality         Status                     ---------                               -----------         ------                     CBC Auto Differential[[id number]]        Abnormal            Final result                 Please view results for these tests on the individual orders.    CBC Auto Differential [[id number]]  (Abnormal) Collected:  10/14/19 1620    Specimen:  Blood Updated:  10/14/19 1727     WBC 18.80 10*3/mm3      RBC 4.29 10*6/mm3      Hemoglobin 13.1 g/dL      Hematocrit 44.9 %      .8 fL      MCH 30.7 pg      MCHC 29.3 g/dL      RDW 19.3 %      RDW-SD 71.8 fl      MPV 9.4 fL      Platelets 210 10*3/mm3      Neutrophil % 88.0 %      Lymphocyte % 3.7 %      Monocyte % 8.1 %      Eosinophil % 0.0  %      Basophil % 0.2 %      Neutrophils, Absolute 16.50 10*3/mm3      Lymphocytes, Absolute 0.70 10*3/mm3      Monocytes, Absolute 1.50 10*3/mm3      Eosinophils, Absolute 0.00 10*3/mm3      Basophils, Absolute 0.00 10*3/mm3      nRBC 0.5 /100 WBC     Blood Culture - Blood, Blood, Venous Line [[id number]] Collected:  10/14/19 1716    Specimen:  Blood, Venous Line Updated:  10/14/19 1724    Protime-INR [[id number]]  (Abnormal) Collected:  10/14/19 1619    Specimen:  Blood Updated:  10/14/19 1723     Protime 23.1 Seconds      INR 2.44    aPTT [[id number]]  (Normal) Collected:  10/14/19 1619    Specimen:  Blood Updated:  10/14/19 1723     PTT 26.2 seconds     BNP [[id number]]  (Abnormal) Collected:  10/14/19 1619    Specimen:  Blood Updated:  10/14/19 1722     BNP 1,784.0 pg/mL      Comment: Results may be falsely decreased if patient taking Biotin.       POC Lactate [[id number]]  (Abnormal) Collected:  10/14/19 1654    Specimen:  Blood Updated:  10/14/19 1657     Lactate 9.2 mmol/L      Comment: Serial Number: [id number]:  [id number]       POC Lactate [[id number]]  (Abnormal) Collected:  10/14/19 1653    Specimen:  Blood Updated:  10/14/19 1657     Lactate 10.0 mmol/L      Comment: Serial Number: [id number]:  [id number]       POC Lactate [[id number]]  (Abnormal) Collected:  10/14/19 1651    Specimen:  Blood Updated:  10/14/19 1656     Lactate 9.1 mmol/L      Comment: Serial Number: [id number]:  [id number]       Lactic Acid, Reflex Timer (This will reflex a repeat order 3-3:15 hours after ordered.) [[id number]] Collected:  10/14/19 1651    Specimen:  Blood Updated:  10/14/19 1656    Blood Gas, Arterial [[id number]]  (Abnormal) Collected:  10/14/19 1646    Specimen:  Arterial Blood Updated:  10/14/19 1655     Site Left Brachial     John's Test N/A     pH, Arterial 7.185 pH units      pCO2, Arterial 31.9 mm Hg      pO2, Arterial 19.5 mm Hg      HCO3, Arterial 12.0 mmol/L      Base Excess, Arterial -15.1 mmol/L       Comment: Serial Number: [id number]:  [id number]        O2 Saturation, Arterial 21.8 %      CO2 Content 13.0 mmol/L      Barometric Pressure for Blood Gas --     Comment: N/A        Modality Cannula     FIO2 44 %      Hemodilution No    Blood Culture - Blood, Arm, Right [[id number]] Collected:  10/14/19 1647    Specimen:  Blood from Arm, Right Updated:  10/14/19 1650          Imaging & Other Studies    Imaging Results (last 72 hours)     Procedure Component Value Units Date/Time    CT Abdomen Pelvis Without Contrast [[id number]] Collected:  10/14/19 1912     Updated:  10/14/19 1925    Narrative:          DATE OF EXAM:  10/14/2019 6:53 PM     PROCEDURE:  CT ABDOMEN PELVIS WO CONTRAST-     INDICATIONS:  pain; A41.9-Sepsis, unspecified organism; R65.21-Severe sepsis with  septic shock     COMPARISON:  CT scan of the abdomen and pelvis 02/16/2019     TECHNIQUE:  Routine transaxial slices were obtained through the abdomen and pelvis  without the administration of intravenous contrast. Reconstructed  coronal and sagittal images were also obtained. Automated exposure  control and iterative construction methods were used.     FINDINGS:  There are moderate to large bilateral pleural effusions with associated  bibasilar dependent subsegmental atelectasis which are worsened from the  previous CT scan. There is a transvenous pacemaker. The liver and spleen  and adrenal glands and pancreas and kidneys appear normal. There is free  fluid deep in the pelvis unusual for a patient of this age. The patient  appears to have had a subtotal colectomy with only the rectum and distal  most aspect of the sigmoid colon remaining. There are scattered small  bowel air-fluid levels in nondistended loops. The small bowel within the  subcutaneous tissues passing from the abdominal wall to the skin does  show some redundancy however there is no significant small bowel  distention to suggest obstruction. There is mild diffuse increased soft  tissue  stranding in the subcutaneous fat and mesenteric fat. There are  degenerative changes of the lumbar spine.       Impression:          1. Subtotal colectomy. There is an ileostomy in the right lower  quadrant. The subcutaneous portion of the ileal loop is slightly  redundant however there is no small bowel distention to suggest  obstruction. There are some scattered small bowel air-fluid levels in  nondistended loops which could reflect an associated mild ileus or  enteritis.  2. Moderate to large bilateral pleural effusions and dependent bibasilar  atelectasis. In addition there is a mild amount of ascites deep in the  pelvis and there is diffuse increased soft tissue stranding in the  subcutaneous fat and mesenteric fat. The constellation of these findings  does raise concern for changes of congestive heart failure.  3. Incidental note is made of a 3 cm right lateral hernia containing  mesenteric fat.     Electronically Signed ByIsrael Carpenter On:10/14/2019 7:16 PM  This report was finalized on [id number] by  Ulysses Carpenter, .    XR Chest 1 View [[id number]] Collected:  10/14/19 1902     Updated:  10/14/19 1905    Narrative:       DATE OF EXAM:  10/14/2019 6:45 PM     PROCEDURE:  XR CHEST 1 VW-     INDICATIONS:  picc tip verification     COMPARISON: Chest x-ray 10/14/2019 at 5:00 PM     TECHNIQUE:   Single radiographic AP view of the chest was obtained.     FINDINGS:  There is been placement of a right PICC line with the tip in the  superior vena cava. There is cardiomegaly with a transvenous pacemaker.  There are extensive bilateral alveolar infiltrates and pleural effusions  with pulmonary vascular congestion which have worsened from the previous  study.        Impression:       Under  1. PICC line in good position with the tip in the superior vena cava.  2. Findings suggesting changes of pulmonary edema and congestive failure  which are worsened from the previous study.     Electronically Signed By-Ulysses  Jayden On:10/14/2019 7:03 PM  This report was finalized on [id number] by  Ulysses Carpenter, .    XR Chest 1 View [[id number]] Collected:  10/14/19 1713     Updated:  10/14/19 1716    Narrative:       DATE OF EXAM:  10/14/2019 5:00 PM     PROCEDURE:  XR CHEST 1 VW-     INDICATIONS:  soa     COMPARISON:  Chest x-ray 02/11/2019     TECHNIQUE:   Single radiographic AP view of the chest was obtained.     FINDINGS:  There is cardiomegaly with a transvenous pacemaker. There is no  significant pulmonary vascular congestion however there are extensive  bibasilar areas of infiltrate and consolidation and effusion. There is a  possible PICC line on the right with the tip in the right axillary vein.          Impression:       Renomegaly. Basilar infiltrates and effusions. The appearance is  somewhat more suggestive of changes of pulmonary edema and congestive  failure. An underlying pneumonia cannot be excluded.     Electronically Signed By-Ulysses Carpenter On:10/14/2019 5:14 PM  This report was finalized on [id number] by  Ulysses Carpenter, .          Assessment/plan  Septic shock likely related to urosepsis with hx of ESBL E. Coli UTI  Acute hypoxic respiratory failure  Leukocytosis   Bilateral pleural effusions   Hyperkalemia  LEYLA  Atrial fib with RVR  Acute hypoxic respiratory failure  H/o Hypertension  H/o LLE DVT and PE  New onset CHF  Chronic anticoagulation with Eliquis  ?Ileus  H/o ileostomy r/t C. Diff  NSTEMI    Plan:    Continue Meropenum and add Vanc  Continue Precision Flow and wean as tolerated to maintain sats ?94%  Pan culture pending  TTE ordered  Trend troponin  Trend lactic aci  D/c Cardizem and start on Amio gtt  D/C Levophed and switch to Sulaiman  Received IVF bolus of 1449 in the ER  Cautious IVFs and monitor renal function and urine output  Insulin, calcium, D50 and bicarb for hyperkalemia and repeat BMP at midnight  ABG pending  Monitor renal function and urine output   Nephrology consulted  Restart  anticoagulation when appropriate, INR 2.44 currently, repeat in the a.m.   Renal ultrasound  Cardiology consulted   [place] when hemodynamically stable    PUD: Famotidine  Insulin:  Sliding scale  VTE:  SCDs  Nutrition: NPO              BAYLEE Soliz Dr.,  ICU attending on call, aware of ICU admission and agrees with plan of care    Critical care time 35 minutes excluding time for proceduers      Electronically signed by Jim Renae APRN at 10/15/19 0610          Emergency Department Notes      Paty Lnua at 10/14/19 1616        RT notified of ABG ordered     Paty Luna  10/14/19 1616      Electronically signed by Paty Luna at 10/14/19 1616     Teresa Galeana RN at 10/14/19 1716        2nd culture was sent prior to antibiotic start, hard stick only able to collect blue culture     eTresa Galeana RN  10/14/19 1716      Electronically signed by Teresa Galeana RN at 10/14/19 1716     Teresa Galeana RN at 10/14/19 1740        Daughter in law came to the ED states that she is the only family that pt has and she is the care giver for pt, ED provider spoke to the daughter in law about the pts poor health and poor prognosis, family reports that she has been ill for the past 8 days. Pt is still a&ox4 pt has been given oral care for comfort. BP is borderline hypotensive, pt remains tachycardia. md is aware.      Teresa Galeana RN  10/14/19 1811      Electronically signed by Teresa Galeana RN at 10/14/19 1811     Teresa Galeana RN at 10/14/19 1811        picc team is at bedside attempting to place picc in order for pt to be placed on norepinephrine, daughter in law at bedside consented to the procedure.      Teresa Galeana RN  10/14/19 1812      Electronically signed by Teresa Galeana RN at 10/14/19 1812     Teresa Galeana RN at 10/14/19 1812        Daughter in law verbalizes that patients wishes it to now be placed on the ventilator. States that she is a  DNR     Teresa Galeana RN  10/14/19 1813      Electronically signed by Teresa Galeana RN at 10/14/19 1813     Jossue Donovan MD at 10/14/19 1852          Subjective   History of Present Illness   History and review of systems limited due to patient's condition  89-year-old female presents not feeling well.  She complains of some pain in left shoulder.  She has been seen by nephrology earlier today was found to be hypotensive with elevated heart rate.  She complained of some abdominal pain and some shortness of breath.  She was noted to have a creatinine of 2.9 and potassium of 5.6.  She was sent here for further evaluation.  Review of Systems  Limited secondary to condition  Past Medical History:   Diagnosis Date   • Ankle wound, right, initial encounter    • Aortic stenosis    • Atrial fibrillation (CMS/HCC)    • Coronary artery disease    • DVT (deep venous thrombosis) (CMS/HCC)    • Hypothyroidism    • Left bundle branch block    • Pulmonary embolus (CMS/HCC)    • Pulmonary hypertension (CMS/HCC)    • Sinus bradycardia    Dementia    Allergies   Allergen Reactions   • Cephalexin Swelling   • Sulfadiazine Rash   • Trimethoprim Hives   • Trospium Hives       Past Surgical History:   Procedure Laterality Date   • COLOSTOMY     • ORIF ANKLE FRACTURE Right 2013    Right Ankle ORIF    • PACEMAKER IMPLANTATION  11/29/2017    Dual Chamber [employer]   • TOTAL ABDOMINAL HYSTERECTOMY         Family History   Problem Relation Age of Onset   • Heart disease Mother    • Diabetes Mother    • Heart disease Sister    • Diabetes Sister        Social History     Socioeconomic History   • Marital status:      Spouse name: Not on file   • Number of children: Not on file   • Years of education: Not on file   • Highest education level: Not on file   Occupational History   • Occupation: Retired   Tobacco Use   • Smoking status: Never Smoker   • Smokeless tobacco: Never Used   Substance and Sexual Activity   •  Alcohol use: No     Frequency: Never   • Drug use: No     Prior to Admission medications    Medication Sig Start Date End Date Taking? Authorizing Provider   cholecalciferol (VITAMIN D3) 1000 units tablet Daily. 9/29/17   Aravind Silva MD   Cranberry 600 MG tablet Take 600 mg by mouth 3 (Three) Times a Day.    Aravind Silva MD   ELIQUIS 2.5 MG tablet tablet Take 2.5 mg by mouth 2 (Two) Times a Day. 6/24/19   Aravind Silva MD   fluorometholone (FML) 0.1 % ophthalmic suspension Administer 1 Drop/kg to both eyes Daily. 5/21/19   Aravind Silva MD   levothyroxine (SYNTHROID, LEVOTHROID) 75 MCG tablet Every Morning Before Breakfast. 6/11/19   Aravind Silva MD   loratadine (CLARITIN) 10 MG tablet Take 10 mg by mouth Daily.    Aravind Silva MD   Magnesium 400 MG tablet MAGNESIUM 400 MG TABS 11/2/18   Aravind Silva MD   metoprolol tartrate (LOPRESSOR) 50 MG tablet Take 50 mg by mouth 2 (Two) Times a Day. 6/11/19   Aravind Silva MD   mirtazapine (REMERON) 7.5 MG tablet 7.5 mg Daily. 6/18/19   Aravind Silva MD   polyvinyl alcohol (ARTIFICIAL TEARS) 1.4 % ophthalmic solution Every 12 (Twelve) Hours. 11/2/18   Aravind Silva MD   Prenatal w/o A Vit-Fe Fum-FA (BP MULTINATAL PLUS) 30-1 MG tablet Daily. 12/19/17   Aravind Silva MD   saccharomyces boulardii (FLORASTOR) 250 MG capsule FLORASTOR 250 MG CAPS 6/11/18   Aravind Silva MD   sertraline (ZOLOFT) 25 MG tablet Take 25 mg by mouth Daily. 6/11/19   Aravind Silva MD           Objective   Physical Exam  89 y.o. female Generally chronically ill in appearance.  She is lethargic.  Eyes are rolling.  Pupils equal round react to light.  Extraocular muscles intact, sclera icteric  Oropharynx clear, mucous membranes dry  neck supple no masses  Cardiovascular rapid regular rhythm without murmur appreciated respiratory lungs clear increased basilar breath sounds   abdomen soft without  localizing mass rebound or guarding.  She does complain of some tenderness.  She has colostomy noted.  Extremities without tenderness edema  Neurologic without obvious focal findings noted motor sensory grossly intact extremities she is lethargic  Dermatologic no significant rash or bruising noted    Procedures          ED Course      Results for orders placed or performed during the hospital encounter of 10/14/19   BNP   Result Value Ref Range    BNP 1,784.0 (H) <=100.0 pg/mL   Troponin   Result Value Ref Range    Troponin I 0.340 (C) 0.000 - 0.030 ng/mL   Protime-INR   Result Value Ref Range    Protime 23.1 (H) 9.6 - 11.7 Seconds    INR 2.44 (C) 0.90 - 1.10   aPTT   Result Value Ref Range    PTT 26.2 24.0 - 31.0 seconds   CBC Auto Differential   Result Value Ref Range    WBC 18.80 (H) 3.40 - 10.80 10*3/mm3    RBC 4.29 3.77 - 5.28 10*6/mm3    Hemoglobin 13.1 12.0 - 15.9 g/dL    Hematocrit 44.9 34.0 - 46.6 %    .8 (H) 79.0 - 97.0 fL    MCH 30.7 26.6 - 33.0 pg    MCHC 29.3 (L) 31.5 - 35.7 g/dL    RDW 19.3 (H) 12.3 - 15.4 %    RDW-SD 71.8 (H) 37.0 - 54.0 fl    MPV 9.4 6.0 - 12.0 fL    Platelets 210 140 - 450 10*3/mm3    Neutrophil % 88.0 (H) 42.7 - 76.0 %    Lymphocyte % 3.7 (L) 19.6 - 45.3 %    Monocyte % 8.1 5.0 - 12.0 %    Eosinophil % 0.0 (L) 0.3 - 6.2 %    Basophil % 0.2 0.0 - 1.5 %    Neutrophils, Absolute 16.50 (H) 1.70 - 7.00 10*3/mm3    Lymphocytes, Absolute 0.70 0.70 - 3.10 10*3/mm3    Monocytes, Absolute 1.50 (H) 0.10 - 0.90 10*3/mm3    Eosinophils, Absolute 0.00 0.00 - 0.40 10*3/mm3    Basophils, Absolute 0.00 0.00 - 0.20 10*3/mm3    nRBC 0.5 (H) 0.0 - 0.2 /100 WBC   Magnesium   Result Value Ref Range    Magnesium 3.4 (H) 1.8 - 2.5 mg/dL   Phosphorus   Result Value Ref Range    Phosphorus 6.5 (H) 2.4 - 4.7 mg/dL   Calcium, Ionized   Result Value Ref Range    Ionized Calcium 1.02 (L) 1.20 - 1.30 mmol/L   C-reactive Protein   Result Value Ref Range    C-Reactive Protein 11.81 (H) 0.00 - 0.70 mg/dL    Blood Gas, Arterial   Result Value Ref Range    Site Left Brachial     John's Test N/A     pH, Arterial 7.185 (C) 7.350 - 7.450 pH units    pCO2, Arterial 31.9 (L) 35.0 - 48.0 mm Hg    pO2, Arterial 19.5 (C) 83.0 - 108.0 mm Hg    HCO3, Arterial 12.0 (L) 21.0 - 28.0 mmol/L    Base Excess, Arterial -15.1 (L) 0.0 - 3.0 mmol/L    O2 Saturation, Arterial 21.8 (L) 94.0 - 98.0 %    CO2 Content 13.0 (L) 22 - 29 mmol/L    Barometric Pressure for Blood Gas      Modality Cannula     FIO2 44 %    Hemodilution No    POC Lactate   Result Value Ref Range    Lactate 9.1 (C) 0.5 - 2.0 mmol/L   POC Lactate   Result Value Ref Range    Lactate 10.0 (C) 0.5 - 2.0 mmol/L   POC Lactate   Result Value Ref Range    Lactate 9.2 (C) 0.5 - 2.0 mmol/L   POC Lactate   Result Value Ref Range    Lactate 9.6 (C) 0.5 - 2.0 mmol/L   Light Blue Top   Result Value Ref Range    Extra Tube hold for add-on    Green Top (Gel)   Result Value Ref Range    Extra Tube Hold for add-ons.    Gold Top - SST   Result Value Ref Range    Extra Tube Hold for add-ons.      Ct Abdomen Pelvis Without Contrast    Result Date: 10/14/2019   1. Subtotal colectomy. 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 The subcutaneous portion of the ileal loop is slightly redundant however there is no small bowel distention to suggest obstruction. There are some scattered small bowel air-fluid levels in nondistended loops which could reflect an associated mild ileus or enteritis. 2. Moderate to large bilateral pleural effusions and dependent bibasilar atelectasis. In addition there is a mild amount of ascites deep in the pelvis and there is diffuse increased soft tissue stranding in the subcutaneous fat and mesenteric fat. The constellation of these findings does raise concern for changes of congestive heart failure. 3. Incidental note is made of a 3 cm right lateral hernia containing mesenteric fat.  Electronically Signed By-Ulysses Carpenter On:10/14/2019 7:16 PM This report  was finalized on [id number] by  Ulysses Carpenter, .    Xr Chest 1 View    Result Date: 10/14/2019  Under 1. PICC line in good position with the tip in the superior vena cava. 2. Findings suggesting changes of pulmonary edema and congestive failure which are worsened from the previous study.  Electronically Signed ByIsrael Carpenter On:10/14/2019 7:03 PM This report was finalized on [id number] by  Ulysses Carpenter, .    Xr Chest 1 View    Result Date: 10/14/2019  Renomegaly. Basilar infiltrates and effusions. The appearance is somewhat more suggestive of changes of pulmonary edema and congestive failure. An underlying pneumonia cannot be excluded.  Electronically Signed ByIsrael Carpenter On:10/14/2019 5:14 PM This report was finalized on [id number] by  Ulysses Carpenter, .    Medications   sodium chloride 0.9 % infusion (75 mL/hr Intravenous Not Given 10/14/19 1739)   sodium chloride 0.9 % flush 10 mL (not administered)   amiodarone (CORDARONE) 150 MG/3ML injection  - ADS Override Pull (  Not Given 10/14/19 1922)   diltiaZEM (CARDIZEM) 125mg/125 mL infusion (5 mg/hr Intravenous Currently Infusing 10/14/19 1700)   norepinephrine (LEVOPHED) 8 mg/250 mL (32 mcg/mL) in sodium chloride 0.9% infusion (premix) (not administered)   dilTIAZem HCl-Sodium Chloride (CARDIZEM) 125-0.9 MG/125ML-% infusion solution  - ADS Override Pull (125 mg  Given 10/14/19 1610)   sodium chloride 0.9 % bolus 500 mL (0 mL Intravenous Stopped 10/14/19 1653)   dilTIAZem (CARDIZEM) injection 10 mg (10 mg Intravenous Given 10/14/19 1615)   meropenem (MERREM) 1 g in sodium chloride 0.9 % 100 mL IVPB (0 g Intravenous Stopped 10/14/19 1734)   sodium chloride 0.9 % bolus 1,449 mL (0 mL/kg × 48.3 kg Intravenous Stopped 10/14/19 1800)   digoxin (LANOXIN) injection 250 mcg ( Intravenous Given 10/14/19 1739)   amiodarone (CORDARONE) 150 mg in sodium chloride 0.9 % (150 mg Intravenous Currently Infusing 10/14/19 5803)     BP (!) 73/40   Pulse (!) 126    "Temp 97.8 °F (36.6 °C) (Oral)   Resp (!) 29   Ht 160 cm (63\")   Wt 48.3 kg (106 lb 7.7 oz)   SpO2 (!) 87%   BMI 18.86 kg/m²                MDM  Number of Diagnoses or Management Options   Paroxysmal atrial fibrillation (CMS/HCC):    Septic shock (CMS/HCC):   Critical Care  Total time providing critical care: 30-74 minutes    Chart review: Patient had admission for atrial fibrillation And UTI February this year  Comorbidity: As per past history  Differential: Atrial fibrillation, SVT, MI, V. tach, severe sepsis, jaundice, UTI  My EKG interpretation: EKG #1 wide-complex tachycardia.  EKG #2 atrial fibrillation with decreased ventricular response  Lab: Troponin elevated 0.34, INR 2.44 BNP elevated 1784.  Lactic acid elevated 9.2, white count 18.8 with hemoglobin 13.1 platelet count 210 with 88 segs.  Arterial blood gas revealed pH 7.18 PCO2 31 PO2 19.  This was repeated.  Respiratory therapist was never confident that it was arterial gas however she is noted to be acidotic.  CRP 11.81,  Radiology: I reviewed x-rays.  Chest x-ray reveals cardia megaly with bibasilar infiltrates and effusion suggestive of edema.  Underlying pneumonia cannot be excluded.  A repeat chest x-ray after PICC line placement reveals some mild worsening of her congestive heart failure.  CT abdomen pelvis without contrast reveals bilateral pleural effusions ascitic fluid in pelvis.  Some diffuse increased soft tissue stranding in the subcutaneous and mesenteric fat.  Constellation of these symptoms raise concern for congestive heart failure.  There is incidental note of a 3 cm right lateral hernia containing only mesenteric fat  Discussion/treatment: Patient was given Cardizem 10 mg IV.  She developed hypotension following this but did have improvement in her rate.  She was given fluid bolus.  She received a total of 30 cc/kg fluid bolus.  She was noted to have improvement in her pressure.  She was then placed on Cardizem 5 mg/h drip.  She " was given digoxin and amiodarone.  She was given Merrem as she has had history of ESBL UTIs.  Patient had PICC line placed as she did have somewhat labile blood pressure and was concerned that she was going to require Levophed.  Patient's labs have been delayed due to lab not running them and catheterized urinalysis was not initially obtained.  Findings were discussed with family.  Advised the critical nature of her illness.  She is noted to have documented DNR status.  Patient was discussed with the intensivist.  They were made aware that the comprehensive metabolic panel has been pending now for approximately 3hours.  Urinalysis is also pending.  Finally was able to obtain catheterized urine when Rodríguez catheter was placed at 1920.  Findings were discussed with patient's daughter-in-law.  Her prognosis is very guarded with her age and multiple comorbidities.  She was advised of this.    SEPTIC SHOCK FOCUSED EXAM ATTESTATION    I attest that I have reassessed tissue perfusion after the fluid bolus given.  Patient was noted to have improvement in mental status and perfusion with treatment.  She was noted to appear to have some worsening of her failure though on chest x-ray.  Patient did develop further hypotension and was started on levophed.  She did have improvement in her heart rate though to low 100 range.  Jossue Donovan MD  10/14/19  7:27 PM    Final diagnoses:   Septic shock (CMS/[facility])   Paroxysmal atrial fibrillation (CMS/[facility])   Acute congestive heart failure, unspecified heart failure type (CMS/[facility])   Non-ST elevation myocardial infarction (NSTEMI) (CMS/[facility])   Acute kidney injury (LEYLA) with acute tubular necrosis (ATN) (CMS/[facility])             Jossue Donovan MD  10/14/19 1936    Electronically signed by Jossue Donovan MD at 10/14/19 1936       Vital Signs (last 2 days)     Date/Time   Temp   Temp src   Pulse   Resp   BP   Patient Position   SpO2    10/15/19 0640   --   --   101   --   --   --   100     10/15/19 0635   --   --   100   --   --   --   100    10/15/19 0630   --   --   100   --   108/44   --   100    10/15/19 0625   --   --   104   --   --   --   100    10/15/19 0620   --   --   103   --   --   --   100    10/15/19 0615   --   --   103   --   136/119  (Abnormal)    --   100    10/15/19 0610   --   --   103   --   --   --   100    10/15/19 0605   --   --   103   --   --   --   100    10/15/19 0600   --   --   106   --   124/49   --   98    10/15/19 0555   --   --   98   --   --   --   100    10/15/19 0550   --   --   97   --   --   --   100    10/15/19 0545   --   --   96   --   105/61   --   100    10/15/19 0540   --   --   98   --   --   --   100    10/15/19 0535   --   --   99   --   --   --   100    10/15/19 0530   --   --   94   --   101/45   --   100    10/15/19 0525   --   --   98   --   --   --   100    10/15/19 0520   --   --   98   --   --   --   100    10/15/19 0515   --   --   93   --   109/42   --   100    10/15/19 0510   --   --   102   --   --   --   100    10/15/19 0505   --   --   98   --   --   --   100    10/15/19 0500   97.7 (36.5)   Oral   97   --   123/62   --   100    10/15/19 0455   --   --   98   --   --   --   100    10/15/19 0450   --   --   98   --   --   --   100    10/15/19 0445   --   --   93   --   116/59   --   100    10/15/19 0440   --   --   96   --   --   --   100    10/15/19 0435   --   --   98   --   108/90   --   100    10/15/19 0430   --   --   96   --   85/36  (Abnormal)    --   100    10/15/19 0425   --   --   96   --   --   --   97    10/15/19 0420   --   --   107   --   --   --   97    10/15/19 0415   --   --   94   --   102/40   --   99    10/15/19 0410   --   --   94   --   --   --   99    10/15/19 0405   --   --   96   --   --   --   100    10/15/19 0400   --   --   95   --   99/57   --   100    10/15/19 0355   --   --   96   --   --   --   100    10/15/19 0350   --   --   95   --   --   --   100    10/15/19 0345   --   --   94   --   99/38  (Abnormal)     --   100    10/15/19 0340   --   --   94   --   --   --   100    10/15/19 0335   --   --   90   --   103/46   --   100    10/15/19 0330   --   --   105   --   92/47   --   100    10/15/19 0325   --   --   108   --   --   --   100    10/15/19 0320   --   --   113   --   --   --   100    10/15/19 0315   --   --   101   --   84/41  (Abnormal)    --   100    10/15/19 0310   --   --   95   --   --   --   100    10/15/19 0305   --   --   93   --   --   --   100    10/15/19 0300   --   --   98   --   78/39  (Abnormal)    --   100    10/15/19 0255   --   --   94   --   --   --   100    10/15/19 0250   --   --   99   --   --   --   100    10/15/19 0245   --   --   96   --   97/38  (Abnormal)    --   100    10/15/19 0240   --   --   104   --   --   --   100    10/15/19 0235   --   --   96   --   --   --   100    10/15/19 0230   --   --   107   --   108/46   --   100    10/15/19 0225   --   --   99   --   --   --   100    10/15/19 0220   --   --   100   --   --   --   100    10/15/19 0215   --   --   102   --   95/47   --   100    10/15/19 0210   --   --   109   --   --   --   100    10/15/19 0205   --   --   100   --   --   --   100    10/15/19 0200   --   --   104   --   96/44   --   100    10/15/19 0155   --   --   95   --   --   --   100    10/15/19 0150   --   --   98   --   --   --   100    10/15/19 0145   --   --   97   --   100/37  (Abnormal)    --   100    10/15/19 0140   --   --   95   --   --   --   100    10/15/19 0135   --   --   97   --   --   --   100    10/15/19 0130   --   --   112   --   99/41   --   100    10/15/19 0125   --   --   110   --   --   --   100    10/15/19 0120   --   --   115   --   --   --   100    10/15/19 0115   --   --   116   --   102/56   --   100    10/15/19 0110   --   --   118   --   --   --   100    10/15/19 0105   --   --   126  (Abnormal)    --   --   --   99    10/15/19 0100   97.5 (36.4)   Oral   133  (Abnormal)    --   113/18  (Abnormal)    --   92    10/15/19 0055   --   --   130   (Abnormal)    --   --   --   99    10/15/19 0050   --   --   134  (Abnormal)    --   151/112  (Abnormal)    --   86  (Abnormal)     10/15/19 0045   --   --   142  (Abnormal)    --   142/112  (Abnormal)    --   97    10/15/19 0040   --   --   132  (Abnormal)    --   --   --   95    10/15/19 0035   --   --   149  (Abnormal)    --   --   --   77  (Abnormal)     10/15/19 0030   --   --   129  (Abnormal)    --   106/58   --   97    10/15/19 0025   --   --   123  (Abnormal)    --   --   --   100    10/15/19 0020   --   --   125  (Abnormal)    --   115/68   --   100    10/15/19 0015   --   --   125  (Abnormal)    --   88/41  (Abnormal)    --   100    10/15/19 0010   --   --   125  (Abnormal)    --   --   --   100    10/15/19 0005   --   --   125  (Abnormal)    --   --   --   100    10/15/19 0001   --   --   --   --   --   --   100    10/15/19 0000   --   --   125  (Abnormal)    --   120/80   --   99    10/14/19 2355   --   --   125  (Abnormal)    --   --   --   97    10/14/19 2350   --   --   124  (Abnormal)    --   --   --   98    10/14/19 2345   --   --   124  (Abnormal)    --   120/59   --   99    10/14/19 2340   --   --   118   --   --   --   99    10/14/19 2335   --   --   116   --   98/67   --   91    10/14/19 2330   --   --   116   --   150/41   --   100    10/14/19 2325   --   --   116   --   --   --   100    10/14/19 2320   --   --   171  (Abnormal)    --   --   --   100    10/14/19 2315   --   --   169  (Abnormal)    --   131/85   --   100    10/14/19 2310   --   --   159  (Abnormal)    --   --   --   100    10/14/19 2305   --   --   149  (Abnormal)    --   --   --   100    10/14/19 2300   --   --   144  (Abnormal)    --   148/35  (Abnormal)    --   100    10/14/19 2255   --   --   135  (Abnormal)    --   --   --   96    10/14/19 2250   --   --   135  (Abnormal)    --   --   --   100    10/14/19 2245   --   --   128  (Abnormal)    --   90/61   --   100    10/14/19 2240   --   --   122  (Abnormal)    --   --   --    100    10/14/19 2235   --   --   115   --   --   --   99    10/14/19 2230   --   --   118   --   115/33  (Abnormal)    --   95    10/14/19 2225   --   --   122  (Abnormal)    --   117/60   --   86  (Abnormal)     10/14/19 2220   --   --   123  (Abnormal)    --   --   --   93    10/14/19 2215   --   --   115   --   --   --   93    10/14/19 2210   --   --   117   --   --   --   100    10/14/19 2205   --   --   116   --   --   --   99    10/14/19 2200   --   --   115   --   103/67   --   99    10/14/19 2155   --   --   115   --   --   --   93    10/14/19 2150   --   --   115   --   131/27  (Abnormal)    --   99    10/14/19 2145   --   --   112   --   --   --   95    10/14/19 2140   --   --   112   --   --   --   93    10/14/19 2135   --   --   110   --   --   --   84  (Abnormal)     10/14/19 2130   --   --   145  (Abnormal)    --   113/69   --   89  (Abnormal)     10/14/19 2125   --   --   171  (Abnormal)    --   --   --   98    10/14/19 2120   --   --   175  (Abnormal)    --   84/58  (Abnormal)    --   99    10/14/19 2115   --   --   169  (Abnormal)    --   106/52   --   99    10/14/19 2110   --   --   176  (Abnormal)    --   71/25  (Abnormal)    --   100    10/14/19 2105   --   --   172  (Abnormal)    --   --   --   99    10/14/19 2100   --   --   175  (Abnormal)    --   156/55   --   99    10/14/19 2055   --   --   173  (Abnormal)    --   --   --   100    10/14/19 2050   --   --   171  (Abnormal)    --   --   --   100    10/14/19 2045   --   --   175  (Abnormal)    --   113/69   --   100    10/14/19 2040   --   --   161  (Abnormal)    --   137/121  (Abnormal)    --   81  (Abnormal)     10/14/19 2035   --   --   157  (Abnormal)    --   --   --   83  (Abnormal)     10/14/19 2030   --   --   149  (Abnormal)    --   83/15  (Abnormal)    --   79  (Abnormal)     10/14/19 2025   --   --   139  (Abnormal)    --   --   --   75  (Abnormal)     10/14/19 2010   --   --   130  (Abnormal)    --   --   --   83  (Abnormal)      10/14/19 2005   --   --   131  (Abnormal)    --   114/95   --   --    10/14/19 2000   --   --   131  (Abnormal)    --   --   --   96    10/14/19 1958   --   --   131  (Abnormal)    --   104/52   --   92    10/14/19 1957   --   --   137  (Abnormal)    --   --   --   98    10/14/19 1955   --   --   116   --   --   --   94    10/14/19 1952   --   --   116   --   115/75   --   86  (Abnormal)     10/14/19 1950   --   --   116   --   --   --   91    10/14/19 1947   --   --   117   --   101/57   --   83  (Abnormal)     10/14/19 1945   --   --   116   --   --   --   89  (Abnormal)     10/14/19 1942   --   --   116   --   98/37  (Abnormal)    --   --    10/14/19 1940   --   --   116   --   --   --   67  (Abnormal)     10/14/19 1936   --   --   117   --   96/35  (Abnormal)    --   70  (Abnormal)     10/14/19 1935   --   --   117   --   --   --   67  (Abnormal)     10/14/19 1930   --   --   117   --   --   --   84  (Abnormal)     10/14/19 1926   --   --   118   --   --   --   92    10/14/19 1925   --   --   117   --   86/43  (Abnormal)    --   91    10/14/19 1922   --   --   118   --   86/70  (Abnormal)    --   89  (Abnormal)     10/14/19 1920   --   --   117   --   --   --   91    10/14/19 1915   --   --   117   --   --   --   78  (Abnormal)     10/14/19 1912   --   --   117   --   85/52  (Abnormal)    --   75  (Abnormal)     10/14/19 1910   --   --   118   --   45/17  (Abnormal)    --   73  (Abnormal)     10/14/19 1905   --   --   118   --   123/105  (Abnormal)    --   73  (Abnormal)     10/14/19 1850   --   --   118   --   --   --   70  (Abnormal)     10/14/19 1845   --   --   120   --   --   --   79  (Abnormal)     10/14/19 1812   --   --   126  (Abnormal)    --   73/40  (Abnormal)    --   87  (Abnormal)     10/14/19 1800   --   --   137  (Abnormal)    --   109/61   --   72  (Abnormal)     10/14/19 1759   --   --   134  (Abnormal)    --   --   --   --    10/14/19 1758   --   --   138  (Abnormal)    --   --   --   --     10/14/19 1757   --   --   138  (Abnormal)    --   79/52  (Abnormal)    --   --    10/14/19 1743   --   --   169  (Abnormal)    --   101/67   --   71  (Abnormal)     10/14/19 1737   --   --   163  (Abnormal)    --   73/52  (Abnormal)    --   68  (Abnormal)     10/14/19 1736   --   --   159  (Abnormal)    --   --   --   73  (Abnormal)     10/14/19 1735   --   --   159  (Abnormal)    --   --   --   66  (Abnormal)     10/14/19 1734   --   --   157  (Abnormal)    --   80/50  (Abnormal)    --   71  (Abnormal)     10/14/19 1728   --   --   164  (Abnormal)    29  (Abnormal)    --   Lying   87  (Abnormal)     10/14/19 1711   --   --   164  (Abnormal)    --   101/83   --   91    10/14/19 1659   --   --   166  (Abnormal)    --   101/64   --   --    10/14/19 1632   --   --   131  (Abnormal)    --   119/58   --   64  (Abnormal)     10/14/19 1626   --   --   123  (Abnormal)    --   121/101  (Abnormal)    --   75  (Abnormal)     10/14/19 1625   --   --   125  (Abnormal)    --   --   --   72  (Abnormal)     10/14/19 1624   --   --   124  (Abnormal)    --   86/48  (Abnormal)    --   --    10/14/19 1623   --   --   125  (Abnormal)    --   --   --   75  (Abnormal)     10/14/19 1622   --   --   123  (Abnormal)    --   --   --   73  (Abnormal)     10/14/19 1621   --   --   124  (Abnormal)    --   60/38  (Abnormal)    --   74  (Abnormal)     10/14/19 1618   --   --   126  (Abnormal)    --   --   --   75  (Abnormal)     10/14/19 1617   --   --   126  (Abnormal)    --   --   --   79  (Abnormal)     10/14/19 1616   --   --   126  (Abnormal)    --   60/38  (Abnormal)    --   82  (Abnormal)     10/14/19 1615   --   --   132  (Abnormal)    --   --   --   77  (Abnormal)     10/14/19 1614   --   --   126  (Abnormal)    --   54/35  (Abnormal)    --   76  (Abnormal)     10/14/19 1552   97.8 (36.6)   Oral   174  (Abnormal)    18   106/91   Sitting   94    10/14/19 1551   --   Oral   --   16   --   Sitting   --            Hospital Medications (all)   "     Dose Frequency Start End    amiodarone (CORDARONE) 150 mg in sodium chloride 0.9 % 150 mg Once 10/14/2019 10/14/2019    Sig - Route: Infuse 150 mg into a venous catheter 1 (One) Time. - Intravenous    amiodarone (CORDARONE) 150 MG/3ML injection  - ADS Override Pull   10/14/2019 10/15/2019    Notes to Pharmacy: Created by cabinet override    AMIODARONE HCL IN DEXTROSE 450-5 MG/250ML-% IV SOLN 1 mg/min Continuous 10/14/2019 10/15/2019    Sig - Route: Infuse 1 mg/min into a venous catheter Continuous. - Intravenous    Linked Group 1:  \"Followed by\" Linked Group Details        AMIODARONE HCL IN DEXTROSE 450-5 MG/250ML-% IV SOLN 0.5 mg/min Continuous 10/15/2019 10/15/2019    Sig - Route: Infuse 0.5 mg/min into a venous catheter Continuous. - Intravenous    Linked Group 1:  \"Followed by\" Linked Group Details        amiodarone in dextrose 5% (NEXTERONE) loading dose 150mg/100mL 150 mg Once 10/14/2019 10/14/2019    Sig - Route: Infuse 100 mL into a venous catheter 1 (One) Time. - Intravenous    Linked Group 1:  \"Followed by\" Linked Group Details        calcium gluconate 2 g/100 mL NS IVPB 2 g Once 10/14/2019 10/14/2019    Sig - Route: Infuse 100 mL into a venous catheter 1 (One) Time. - Intravenous    dextrose (D50W) 25 g/ 50mL Intravenous Solution 25 g 25 g Every 15 Minutes PRN 10/14/2019     Sig - Route: Infuse 50 mL into a venous catheter Every 15 (Fifteen) Minutes As Needed for Low Blood Sugar (Blood Sugar Less Than 70). - Intravenous    dextrose (D50W) 25 g/ 50mL Intravenous Solution 50 mL 50 mL Once 10/14/2019 10/14/2019    Sig - Route: Infuse 50 mL into a venous catheter 1 (One) Time. - Intravenous    dextrose (GLUTOSE) oral gel 15 g 15 g Every 15 Minutes PRN 10/14/2019     Sig - Route: Take 15 application by mouth Every 15 (Fifteen) Minutes As Needed for Low Blood Sugar (Blood sugar less than 70). - Oral    digoxin (LANOXIN) injection 250 mcg 250 mcg Once 10/14/2019 10/14/2019    Sig - Route: Infuse 1 mL into a " venous catheter 1 (One) Time. - Intravenous    dilTIAZem (CARDIZEM) injection 10 mg 10 mg Once 10/14/2019 10/14/2019    Sig - Route: Infuse 2 mL into a venous catheter 1 (One) Time. - Intravenous    dilTIAZem HCl-Sodium Chloride (CARDIZEM) 125-0.9 MG/125ML-% infusion solution  - ADS Override Pull   10/14/2019 10/14/2019    Notes to Pharmacy: Created by cabinet override    famotidine (PEPCID) tablet 20 mg 20 mg Every Early Morning 10/15/2019     Sig - Route: Take 1 tablet by mouth Every Morning. - Oral    glucagon (human recombinant) (GLUCAGEN DIAGNOSTIC) injection 1 mg 1 mg Every 15 Minutes PRN 10/14/2019     Sig - Route: Inject 1 mg under the skin into the appropriate area as directed Every 15 (Fifteen) Minutes As Needed for Low Blood Sugar (Blood Glucose Less Than 70). - Subcutaneous    insulin lispro (humaLOG) injection 0-7 Units 0-7 Units Every 6 Hours Scheduled 10/15/2019     Sig - Route: Inject 0-7 Units under the skin into the appropriate area as directed Every 6 (Six) Hours. - Subcutaneous    insulin regular (humuLIN R,novoLIN R) injection 10 Units 10 Units Once 10/14/2019 10/14/2019    Sig - Route: Infuse 10 Units into a venous catheter 1 (One) Time. - Intravenous    meropenem (MERREM) 1 g in sodium chloride 0.9 % 100 mL IVPB 1 g Once 10/14/2019 10/14/2019    Sig - Route: Infuse 1 g into a venous catheter 1 (One) Time. - Intravenous    meropenem (MERREM) 500 mg in sodium chloride 0.9 % 100 mL IVPB 500 mg Every 24 Hours 10/15/2019 10/22/2019    Sig - Route: Infuse 500 mg into a venous catheter Daily. - Intravenous    metoprolol tartrate (LOPRESSOR) injection 2.5 mg 2.5 mg Once 10/15/2019 10/14/2019    Sig - Route: Infuse 2.5 mL into a venous catheter 1 (One) Time. - Intravenous    ondansetron (ZOFRAN) injection 4 mg 4 mg Every 6 Hours PRN 10/15/2019     Sig - Route: Infuse 2 mL into a venous catheter Every 6 (Six) Hours As Needed for Nausea or Vomiting. - Intravenous    Pharmacy to Dose meropenem (MERREM)   Continuous PRN 10/14/2019 10/21/2019    Sig - Route: Continuous As Needed for Consult. - Does not apply    Pharmacy to dose vancomycin  Continuous PRN 10/15/2019 10/22/2019    Sig - Route: Continuous As Needed for Consult. - Does not apply    phenylephrine (AREN-SYNEPHRINE) 50 mg/250 mL (0.2 mg/mL) in 0.9% NS  infusion 0.5-3 mcg/kg/min × 48.3 kg Titrated 10/14/2019     Sig - Route: Infuse 24..9 mcg/min into a venous catheter Dose Adjusted By Provider As Needed. - Intravenous    sodium bicarbonate 8.4 % 150 mEq in dextrose (D5W) 5 % 1,000 mL infusion (greater than 75 mEq) 150 mEq Continuous 10/15/2019     Sig - Route: Infuse 150 mEq into a venous catheter Continuous. - Intravenous    sodium bicarbonate injection 8.4% 100 mEq 100 mEq Once 10/15/2019 10/15/2019    Sig - Route: Infuse 100 mL into a venous catheter 1 (One) Time. - Intravenous    sodium bicarbonate injection 8.4% 50 mEq 50 mEq Once 10/14/2019 10/14/2019    Sig - Route: Infuse 50 mL into a venous catheter 1 (One) Time. - Intravenous    sodium chloride 0.9 % bolus 1,449 mL 30 mL/kg × 48.3 kg Once 10/14/2019 10/14/2019    Sig - Route: Infuse 1,449 mL into a venous catheter 1 (One) Time. - Intravenous    sodium chloride 0.9 % bolus 500 mL 500 mL Once 10/14/2019 10/14/2019    Sig - Route: Infuse 500 mL into a venous catheter 1 (One) Time. - Intravenous    sodium chloride 0.9 % flush 10 mL 10 mL As Needed 10/14/2019     Sig - Route: Infuse 10 mL into a venous catheter As Needed for Line Care. - Intravenous    sodium chloride 0.9 % flush 10 mL 10 mL Every 12 Hours Scheduled 10/14/2019     Sig - Route: Infuse 10 mL into a venous catheter Every 12 (Twelve) Hours. - Intravenous    sodium chloride 0.9 % flush 10 mL 10 mL As Needed 10/14/2019     Sig - Route: Infuse 10 mL into a venous catheter As Needed for Line Care. - Intravenous    sodium chloride 0.9 % infusion 75 mL/hr Continuous 10/14/2019     Sig - Route: Infuse 75 mL/hr into a venous catheter  Continuous. - Intravenous    vancomycin 1000 mg/250 mL 0.9% NS IVPB ([facility]) 20 mg/kg × 53.7 kg Once 10/15/2019 10/15/2019    Sig - Route: Infuse 250 mL into a venous catheter 1 (One) Time. - Intravenous    vancomycin 750 mg in sodium chloride 0.9 % 100 mL IVPB 15 mg/kg × 53.7 kg As Needed 10/15/2019 10/22/2019    Sig - Route: Infuse 750 mg into a venous catheter As Needed (pharmacy will schedule when due). - Intravenous    apixaban (ELIQUIS) tablet 2.5 mg (Discontinued) 2.5 mg Every 12 Hours Scheduled 10/14/2019 10/15/2019    Sig - Route: Take 1 tablet by mouth Every 12 (Twelve) Hours. - Oral    diltiaZEM (CARDIZEM) 125mg/125 mL infusion (Discontinued) 5 mg/hr Titrated 10/14/2019 10/14/2019    Sig - Route: Infuse 5 mg/hr into a venous catheter Dose Adjusted By Provider As Needed. - Intravenous    norepinephrine (LEVOPHED) 8 mg/250 mL (32 mcg/mL) in sodium chloride 0.9% infusion (premix) (Discontinued) 0.02-0.3 mcg/kg/min × 48.3 kg Titrated 10/14/2019 10/14/2019    Sig - Route: Infuse 0.966-14.49 mcg/min into a venous catheter Dose Adjusted By Provider As Needed. - Intravenous    piperacillin-tazobactam (ZOSYN) IVPB 3.375 g in 100 mL NS (CD) (Discontinued) 3.375 g Once 10/14/2019 10/14/2019    Sig - Route: Infuse 3.375 g into a venous catheter 1 (One) Time. - Intravenous

## 2019-10-15 NOTE — PAYOR COMM NOTE
"  [facility]  NPI # [id number]  TID # [id number]        RETURN CONTACT  [facility]    PH: [phone]  FX: [phone]  ===========================     REFERENCE #  [id number] - PENDING   ===========================    ATTN : RN REVIEW      REQUEST FOR INPATIENT AUTHORIZATION  ===========================  Please respond to above contact. Thank you.      ===========================       Dinora rogers (89 y.o. Female)     Date of Birth Social Security Number Address Home Phone MRN    06/18/1926  [id number] [address] [phone] [id number]    Buddhist Marital Status          None        Admission Date Admission Type Admitting Provider Attending Provider Department, Room/Bed    10/14/19 Emergency Amanda Kaba MD Khan, Zaka Urrehman, MD [facility] INTENSIVE CARE UNIT, 2309/1    Discharge Date Discharge Disposition Discharge Destination                       Attending Provider:  Amanda Kaba MD    Allergies:  Cephalexin, Sulfadiazine, Trimethoprim, Trospium    Isolation:  None   Infection:  None   Code Status:  No CPR    Ht:  160 cm (63\")   Wt:  57.7 kg (127 lb 3.3 oz)    Admission Cmt:  None   Principal Problem:  None                Active Insurance as of 10/14/2019     Primary Coverage     Payor Plan Insurance Group Employer/Plan Group    ANTH MEDICARE REPLACEMENT [employer] MEDICARE ADVANTAGE [id number]     Payor Plan Address Payor Plan Phone Number Payor Plan Fax Number Effective Dates    [address] [phone]  1/1/2015 - None Entered    [facility] [address]       Subscriber Name Subscriber Birth Date Member ID       DINORA ROGERS 6/18/1926 [id number]                 Emergency Contacts      (Rel.) Home Phone Work Phone Mobile Phone    Contact, No [phone] -- --                SEE ATTACHED INFORMATION         "

## 2019-10-15 NOTE — PROGRESS NOTES
Discharge Planning Assessment  [facility]     Patient Name: Dinora Vásqeuz  MRN: [id number]  Today's Date: 10/15/2019    Admit Date: 10/14/2019    Discharge Needs Assessment     Row Name 10/15/19 1124       Living Environment    Lives With  facility resident    Unique Family Situation  from [facility]     Current Living Arrangements  extended care facility    Provides Primary Care For  no one, unable/limited ability to care for self    Quality of Family Relationships  supportive    Able to Return to Prior Arrangements  yes       Discharge Needs Assessment    Readmission Within the Last 30 Days  no previous admission in last 30 days    Discharge Coordination/Progress  d/c plan will be to return to [facility] at d/c         Discharge Plan     Row Name 10/15/19 1125       Plan    Plan  Pt is from [facility] , private pay . Physical therapy and occupational therapy to evaluate .    Plan Comments  Pt was admitted with afib with rvr and is on a Amio gtt . Pt also has a uti and hypotension  is on a Levophed gtt . Pt is on 30l p.f. o2 .        Destination      No service coordination in this encounter.      Durable Medical Equipment      No service coordination in this encounter.      Dialysis/Infusion      No service coordination in this encounter.      Home Medical Care      No service coordination in this encounter.      Therapy      No service coordination in this encounter.      Community Resources      No service coordination in this encounter.          Demographic Summary    No documentation.       Functional Status    No documentation.       Psychosocial    No documentation.       Abuse/Neglect    No documentation.       Legal    No documentation.       Substance Abuse    No documentation.       Patient Forms    No documentation.           Anu Franco RN

## 2019-10-15 NOTE — DISCHARGE PLACEMENT REQUEST
"Dinora Hdez (89 y.o. Female)     Date of Birth Social Security Number Address Home Phone MRN    [id number]  [id number] [phone] [id number]    Protestant Marital Status          None        Admission Date Admission Type Admitting Provider Attending Provider Department, Room/Bed    10/14/19 Emergency Amanda Kaba MD Khan, Zaka Urrehman, MD [facility] INTENSIVE CARE UNIT, 9/    Discharge Date Discharge Disposition Discharge Destination                       Attending Provider:  Amanda Kaba MD    Allergies:  Cephalexin, Sulfadiazine, Trimethoprim, Trospium    Isolation:  None   Infection:  None   Code Status:  No CPR    Ht:  160 cm (63\")   Wt:  57.7 kg (127 lb 3.3 oz)    Admission Cmt:  None   Principal Problem:  None                Active Insurance as of 10/14/2019     Primary Coverage     Payor Plan Insurance Group Employer/Plan Group    ANTHEM MEDICARE REPLACEMENT ANTH MEDICARE ADVANTAGE [id number]     Payor Plan Address Payor Plan Phone Number Payor Plan Fax Number Effective Dates    [address] [phone]  2015 - None Entered    [facility] [address]       Subscriber Name Subscriber Birth Date Member ID       DINORA HDEZ 1926 [id number]                 Emergency Contacts      (Rel.) Home Phone Work Phone Mobile Phone    Contact, No [phone] -- --               History & Physical      Jim Renae APRN at 10/14/19 2050          Pulmonary/ Critical Care ADMISSION H&P Note        Patient Name:  Dinora Hdez    :  1926    Medical Record:  [id number]    Primary Care Physician     Florentino Kimbrough MD [facility]OP[facility]  Dinora Hdez is a 89 y.o. female who was presented to the ER after being seen earlier by Nephrology and found to be hypotensive and tachycardic; in addition she complained of dyspnea, abdominal pain and not feeling well.  She had a bolus of IVFs started and " transferred to the ER.  Workup in the ER revealed afib with RVR, UTI, septic shock, CHF, STEMI, acute hypoxic respiratory failure and LEYLA.  She had IVFs per sepsis protocol given and started on Meropenem.  She was given a bolus of amio and Cardizem without improvement in her heart rate and subsequently developed hypotension.  She had a PICC ordered by the ER and was started on Levophed.   The patient is currently difficult to get a history from as she is obtunded.  Her sats were in the 70's and she was started on Precision Flow with improvement.      Review of Systems    Unable to obtain.        Home medicationS  Prior to Admission medications    Medication Sig Start Date End Date Taking? Authorizing Provider   cholecalciferol (VITAMIN D3) 1000 units tablet Daily. 9/29/17   Aravind Silva MD   Cranberry 600 MG tablet Take 600 mg by mouth 3 (Three) Times a Day.    Aravind Silva MD   ELIQUIS 2.5 MG tablet tablet Take 2.5 mg by mouth 2 (Two) Times a Day. 6/24/19   Aravind Silva MD   fluorometholone (FML) 0.1 % ophthalmic suspension Administer 1 Drop/kg to both eyes Daily. 5/21/19   Aravind Silva MD   levothyroxine (SYNTHROID, LEVOTHROID) 75 MCG tablet Every Morning Before Breakfast. 6/11/19   Aravind Silva MD   loratadine (CLARITIN) 10 MG tablet Take 10 mg by mouth Daily.    Aravind Silva MD   Magnesium 400 MG tablet MAGNESIUM 400 MG TABS 11/2/18   Aravind Silva MD   metoprolol tartrate (LOPRESSOR) 50 MG tablet Take 50 mg by mouth 2 (Two) Times a Day. 6/11/19   Aravind Silva MD   mirtazapine (REMERON) 7.5 MG tablet 7.5 mg Daily. 6/18/19   Aravind Silva MD   polyvinyl alcohol (ARTIFICIAL TEARS) 1.4 % ophthalmic solution Every 12 (Twelve) Hours. 11/2/18   Aravind Silva MD   Prenatal w/o A Vit-Fe Fum-FA (BP MULTINATAL PLUS) 30-1 MG tablet Daily. 12/19/17   Aravind Silva MD   saccharomyces boulardii (FLORASTOR) 250 MG capsule FLORASTOR  250 MG CAPS 18   ProviderAravind MD   sertraline (ZOLOFT) 25 MG tablet Take 25 mg by mouth Daily. 19   Provider, MD Aravind       Medical History    Past Medical History:   Diagnosis Date   • Ankle wound, right, initial encounter    • Aortic stenosis    • Atrial fibrillation (CMS/HCC)    • Coronary artery disease    • DVT (deep venous thrombosis) (CMS/HCC)    • Hypothyroidism    • Left bundle branch block    • Pulmonary embolus (CMS/HCC)    • Pulmonary hypertension (CMS/HCC)    • Sinus bradycardia         Surgical History    Past Surgical History:   Procedure Laterality Date   • COLOSTOMY     • ORIF ANKLE FRACTURE Right 2013    Right Ankle ORIF    • PACEMAKER IMPLANTATION  2017    Dual Chamber [employer]   • TOTAL ABDOMINAL HYSTERECTOMY          Family History    Family History   Problem Relation Age of Onset   • Heart disease Mother    • Diabetes Mother    • Heart disease Sister    • Diabetes Sister        Social History    Social History     Tobacco Use   • Smoking status: Never Smoker   • Smokeless tobacco: Never Used   Substance Use Topics   • Alcohol use: No     Frequency: Never        Allergies    Allergies   Allergen Reactions   • Cephalexin Swelling   • Sulfadiazine Rash   • Trimethoprim Hives   • Trospium Hives       Medications    Scheduled Meds:  amiodarone      sodium chloride 10 mL Intravenous Q12H     Continuous Infusions:  diltiaZEM 5 mg/hr Last Rate: 5 mg/hr (10/14/19 170)   norepinephrine 0.02-0.3 mcg/kg/min Last Rate: 0.1 mcg/kg/min (10/14/19 1941)   phenylephrine 0.5-3 mcg/kg/min    sodium chloride 75 mL/hr Last Rate: 75 mL/hr (10/14/19 2045)     PRN Meds:.sodium chloride  •  sodium chloride      Physical Exam    tMax 24 hrs:  Temp (24hrs), Av.8 °F (36.6 °C), Min:97.8 °F (36.6 °C), Max:97.8 °F (36.6 °C)    Vitals Ranges:  Temp:  [97.8 °F (36.6 °C)] 97.8 °F (36.6 °C)  Heart Rate:  [116-174] 131  Resp:  [16-29] 29  BP: ()/() 104/52  Intake and Output  Last 3 Shifts:  I/O last 3 completed shifts:  In: 1449 [IV Piggyback:1449]  Out: -     Constitutional:  Obtunded.  No acute respiratory distress   HEENT:  Atraumatic, PERRL, conjunctiva normal, moist oral mucosa, no nasal discharge.  Trachea is midline.  Respiratory:  No respiratory distress.  Seen wearing high flow/supplemental O2.  Diminished breath sounds bilaterally.    Cardiovascular:  Irregular, tachy.   Pulses 2+ and equal in all four extremities.    GI:  Soft, nondistended.  Nontender to palpation.   Ileostomy  with stool output noted.   Extremities: No edema, cyanosis or tenderness.  Integument:  No rashes.   Neurologic:  Obtunded.  Moves all four extremities to painful stimuli.   No focal neurologic deficits observed.      labs    Lab Results (last 24 hours)     Procedure Component Value Units Date/Time    MRSA Screen Culture - Swab, Nares [[id number]] Collected:  10/14/19 2036    Specimen:  Swab from Nares Updated:  10/14/19 2039    Comprehensive Metabolic Panel [[id number]] Collected:  10/14/19 1647    Specimen:  Blood Updated:  10/14/19 2036    Comprehensive Metabolic Panel [[id number]]  (Abnormal) Collected:  10/14/19 1619    Specimen:  Blood Updated:  10/14/19 2017     Glucose 152 mg/dL      BUN 71 mg/dL      Creatinine 4.00 mg/dL      Sodium 142 mmol/L      Potassium 5.2 mmol/L      Chloride 103 mmol/L      CO2 12.0 mmol/L      Calcium 8.0 mg/dL      Total Protein 6.4 g/dL      Albumin 3.50 g/dL      ALT (SGPT) <5 U/L      AST (SGOT) 51 U/L      Alkaline Phosphatase 97 U/L      Total Bilirubin 2.1 mg/dL      eGFR Non African Amer 10 mL/min/1.73      Comment: <15 Indicative of kidney failure.        eGFR   Amer --     Comment: <15 Indicative of kidney failure.        Globulin 2.9 gm/dL      A/G Ratio 1.2 g/dL      BUN/Creatinine Ratio 17.8     Anion Gap 32.2 mmol/L     Narrative:       The MDRD GFR formula is only valid for adults with stable renal function between ages 18 and 70.    Urinalysis  With Culture If Indicated - Urine, Catheter [[id number]]  (Abnormal) Collected:  10/14/19 1929    Specimen:  Urine, Catheter Updated:  10/14/19 2015     Color, UA Dark Yellow     Appearance, UA Turbid     Comment: Result checked         pH, UA 5.5     Specific Gravity, UA 1.016     Glucose, UA Negative     Ketones, UA Trace     Bilirubin, UA Small (1+)     Comment: Confirmation testing is unavailable.  A serum bilirubin is recommended for further assessment.        Blood, UA Moderate (2+)     Protein, UA 30 mg/dL (1+)     Leuk Esterase, UA Large (3+)     Nitrite, UA Negative     Urobilinogen, UA 0.2 E.U./dL    Urinalysis, Microscopic Only - Urine, Catheter [[id number]]  (Abnormal) Collected:  10/14/19 1929    Specimen:  Urine, Catheter Updated:  10/14/19 2015     RBC, UA 3-5 /HPF      WBC, UA Too Numerous to Count /HPF      Bacteria, UA 3+ /HPF      Squamous Epithelial Cells, UA 3-6 /HPF      Hyaline Casts, UA 3-6 /LPF      Granular Casts, UA 0-2 /LPF      Methodology Manual Light Microscopy    Urine Culture - Urine, Urine, Catheter [[id number]] Collected:  10/14/19 1929    Specimen:  Urine, Catheter Updated:  10/14/19 2015    Respiratory Panel, PCR - Swab, Nasopharynx [[id number]] Collected:  10/14/19 1951    Specimen:  Swab from Nasopharynx Updated:  10/14/19 2003    POC Lactate [[id number]]  (Abnormal) Collected:  10/14/19 1910    Specimen:  Blood Updated:  10/14/19 1912     Lactate 9.6 mmol/L      Comment: Serial Number: [id number]:  [id number]       Magnesium [[id number]]  (Abnormal) Collected:  10/14/19 1619    Specimen:  Blood Updated:  10/14/19 1857     Magnesium 3.4 mg/dL     Phosphorus [[id number]]  (Abnormal) Collected:  10/14/19 1619    Specimen:  Blood Updated:  10/14/19 1857     Phosphorus 6.5 mg/dL     C-reactive Protein [[id number]]  (Abnormal) Collected:  10/14/19 1619    Specimen:  Blood Updated:  10/14/19 1857     C-Reactive Protein 11.81 mg/dL     Calcium, Ionized [[id number]]  (Abnormal) Collected:   10/14/19 1647    Specimen:  Blood Updated:  10/14/19 1808     Ionized Calcium 1.02 mmol/L     [place] Draw [[id number]] Collected:  10/14/19 1619    Specimen:  Blood Updated:  10/14/19 1800    Narrative:       The following orders were created for panel order [place] Draw.  Procedure                               Abnormality         Status                     ---------                               -----------         ------                     Light Blue Top[[id number]]                                   Final result               Green Top (Gel)[[id number]]                                  Final result               Lavender Top[[id number]]                                                                Gold Top - SST[[id number]]                                   Final result                 Please view results for these tests on the individual orders.    Light Blue Top [[id number]] Collected:  10/14/19 1619    Specimen:  Blood Updated:  10/14/19 1800     Extra Tube hold for add-on     Comment: Auto resulted       Green Top (Gel) [[id number]] Collected:  10/14/19 1619    Specimen:  Blood Updated:  10/14/19 1800     Extra Tube Hold for add-ons.     Comment: Auto resulted.       Gold Top - SST [[id number]] Collected:  10/14/19 1647    Specimen:  Blood Updated:  10/14/19 1800     Extra Tube Hold for add-ons.     Comment: Auto resulted.       Troponin [[id number]]  (Abnormal) Collected:  10/14/19 1620    Specimen:  Blood Updated:  10/14/19 1742     Troponin I 0.340 ng/mL     Narrative:       Troponin I Reference Range:    0.00-0.03  Negative.  Repeat testing in 4-6 hours if clinically indicated.    0.04-0.29  Suspicious for myocardial injury. Serial measurements and clinical  correlation may be necessary to confirm or exclude diagnosis of acute  coronary syndrome.  Repeat testing in 4-6 hours if indicated.     >0.29 Consistent with myocardial injury.  Recommend clinical and laboratory correlation.     Results my be falsely  decreased if patient taking Biotin.     CBC & Differential [[id number]] Collected:  10/14/19 1620    Specimen:  Blood Updated:  10/14/19 1727    Narrative:       The following orders were created for panel order CBC & Differential.  Procedure                               Abnormality         Status                     ---------                               -----------         ------                     CBC Auto Differential[[id number]]        Abnormal            Final result                 Please view results for these tests on the individual orders.    CBC Auto Differential [[id number]]  (Abnormal) Collected:  10/14/19 1620    Specimen:  Blood Updated:  10/14/19 1727     WBC 18.80 10*3/mm3      RBC 4.29 10*6/mm3      Hemoglobin 13.1 g/dL      Hematocrit 44.9 %      .8 fL      MCH 30.7 pg      MCHC 29.3 g/dL      RDW 19.3 %      RDW-SD 71.8 fl      MPV 9.4 fL      Platelets 210 10*3/mm3      Neutrophil % 88.0 %      Lymphocyte % 3.7 %      Monocyte % 8.1 %      Eosinophil % 0.0 %      Basophil % 0.2 %      Neutrophils, Absolute 16.50 10*3/mm3      Lymphocytes, Absolute 0.70 10*3/mm3      Monocytes, Absolute 1.50 10*3/mm3      Eosinophils, Absolute 0.00 10*3/mm3      Basophils, Absolute 0.00 10*3/mm3      nRBC 0.5 /100 WBC     Blood Culture - Blood, Blood, Venous Line [[id number]] Collected:  10/14/19 1716    Specimen:  Blood, Venous Line Updated:  10/14/19 1724    Protime-INR [[id number]]  (Abnormal) Collected:  10/14/19 1619    Specimen:  Blood Updated:  10/14/19 1723     Protime 23.1 Seconds      INR 2.44    aPTT [[id number]]  (Normal) Collected:  10/14/19 1619    Specimen:  Blood Updated:  10/14/19 1723     PTT 26.2 seconds     BNP [[id number]]  (Abnormal) Collected:  10/14/19 1619    Specimen:  Blood Updated:  10/14/19 1722     BNP 1,784.0 pg/mL      Comment: Results may be falsely decreased if patient taking Biotin.       POC Lactate [[id number]]  (Abnormal) Collected:  10/14/19 1654    Specimen:  Blood  Updated:  10/14/19 1657     Lactate 9.2 mmol/L      Comment: Serial Number: [id number]:  [id number]       POC Lactate [[id number]]  (Abnormal) Collected:  10/14/19 1653    Specimen:  Blood Updated:  10/14/19 1657     Lactate 10.0 mmol/L      Comment: Serial Number: [id number]:  [id number]       POC Lactate [[id number]]  (Abnormal) Collected:  10/14/19 1651    Specimen:  Blood Updated:  10/14/19 1656     Lactate 9.1 mmol/L      Comment: Serial Number: [id number]:  [id number]       Lactic Acid, Reflex Timer (This will reflex a repeat order 3-3:15 hours after ordered.) [[id number]] Collected:  10/14/19 1651    Specimen:  Blood Updated:  10/14/19 1656    Blood Gas, Arterial [[id number]]  (Abnormal) Collected:  10/14/19 1646    Specimen:  Arterial Blood Updated:  10/14/19 1655     Site Left Brachial     John's Test N/A     pH, Arterial 7.185 pH units      pCO2, Arterial 31.9 mm Hg      pO2, Arterial 19.5 mm Hg      HCO3, Arterial 12.0 mmol/L      Base Excess, Arterial -15.1 mmol/L      Comment: Serial Number: [id number]:  [id number]        O2 Saturation, Arterial 21.8 %      CO2 Content 13.0 mmol/L      Barometric Pressure for Blood Gas --     Comment: N/A        Modality Cannula     FIO2 44 %      Hemodilution No    Blood Culture - Blood, Arm, Right [[id number]] Collected:  10/14/19 1647    Specimen:  Blood from Arm, Right Updated:  10/14/19 1650          Imaging & Other Studies    Imaging Results (last 72 hours)     Procedure Component Value Units Date/Time    CT Abdomen Pelvis Without Contrast [[id number]] Collected:  10/14/19 1912     Updated:  10/14/19 1925    Narrative:          DATE OF EXAM:  10/14/2019 6:53 PM     PROCEDURE:  CT ABDOMEN PELVIS WO CONTRAST-     INDICATIONS:  pain; A41.9-Sepsis, unspecified organism; R65.21-Severe sepsis with  septic shock     COMPARISON:  CT scan of the abdomen and pelvis 02/16/2019     TECHNIQUE:  Routine transaxial slices were obtained through the abdomen and  pelvis  without the administration of intravenous contrast. Reconstructed  coronal and sagittal images were also obtained. Automated exposure  control and iterative construction methods were used.     FINDINGS:  There are moderate to large bilateral pleural effusions with associated  bibasilar dependent subsegmental atelectasis which are worsened from the  previous CT scan. There is a transvenous pacemaker. The liver and spleen  and adrenal glands and pancreas and kidneys appear normal. There is free  fluid deep in the pelvis unusual for a patient of this age. The patient  appears to have had a subtotal colectomy with only the rectum and distal  most aspect of the sigmoid colon remaining. There are scattered small  bowel air-fluid levels in nondistended loops. The small bowel within the  subcutaneous tissues passing from the abdominal wall to the skin does  show some redundancy however there is no significant small bowel  distention to suggest obstruction. There is mild diffuse increased soft  tissue stranding in the subcutaneous fat and mesenteric fat. There are  degenerative changes of the lumbar spine.       Impression:          1. Subtotal colectomy. There is an ileostomy in the right lower  quadrant. The subcutaneous portion of the ileal loop is slightly  redundant however there is no small bowel distention to suggest  obstruction. There are some scattered small bowel air-fluid levels in  nondistended loops which could reflect an associated mild ileus or  enteritis.  2. Moderate to large bilateral pleural effusions and dependent bibasilar  atelectasis. In addition there is a mild amount of ascites deep in the  pelvis and there is diffuse increased soft tissue stranding in the  subcutaneous fat and mesenteric fat. The constellation of these findings  does raise concern for changes of congestive heart failure.  3. Incidental note is made of a 3 cm right lateral hernia containing  mesenteric fat.      Electronically Signed ByIsrael Carpenter On:10/14/2019 7:16 PM  This report was finalized on [id number] by  Ulysses Carpenter, .    XR Chest 1 View [[id number]] Collected:  10/14/19 1902     Updated:  10/14/19 1905    Narrative:       DATE OF EXAM:  10/14/2019 6:45 PM     PROCEDURE:  XR CHEST 1 VW-     INDICATIONS:  picc tip verification     COMPARISON: Chest x-ray 10/14/2019 at 5:00 PM     TECHNIQUE:   Single radiographic AP view of the chest was obtained.     FINDINGS:  There is been placement of a right PICC line with the tip in the  superior vena cava. There is cardiomegaly with a transvenous pacemaker.  There are extensive bilateral alveolar infiltrates and pleural effusions  with pulmonary vascular congestion which have worsened from the previous  study.        Impression:       Under  1. PICC line in good position with the tip in the superior vena cava.  2. Findings suggesting changes of pulmonary edema and congestive failure  which are worsened from the previous study.     Electronically Signed By-Ulysses Carpenter On:10/14/2019 7:03 PM  This report was finalized on [id number] by  Ulysses Carpenter, .    XR Chest 1 View [[id number]] Collected:  10/14/19 1713     Updated:  10/14/19 1716    Narrative:       DATE OF EXAM:  10/14/2019 5:00 PM     PROCEDURE:  XR CHEST 1 VW-     INDICATIONS:  soa     COMPARISON:  Chest x-ray 02/11/2019     TECHNIQUE:   Single radiographic AP view of the chest was obtained.     FINDINGS:  There is cardiomegaly with a transvenous pacemaker. There is no  significant pulmonary vascular congestion however there are extensive  bibasilar areas of infiltrate and consolidation and effusion. There is a  possible PICC line on the right with the tip in the right axillary vein.          Impression:       Renomegaly. Basilar infiltrates and effusions. The appearance is  somewhat more suggestive of changes of pulmonary edema and congestive  failure. An underlying pneumonia cannot be excluded.      Electronically Signed By-Ulysses Carpenter On:10/14/2019 5:14 PM  This report was finalized on [id number] by  Ulysses Carpenter, .          Assessment/plan  Septic shock likely related to urosepsis with hx of ESBL E. Coli UTI  Acute hypoxic respiratory failure  Leukocytosis   Bilateral pleural effusions   Hyperkalemia  LEYLA  Atrial fib with RVR  Acute hypoxic respiratory failure  H/o Hypertension  H/o LLE DVT and PE  New onset CHF  Chronic anticoagulation with Eliquis  ?Ileus  H/o ileostomy r/t C. Diff  NSTEMI    Plan:    Continue Meropenum and add Vanc  Continue Precision Flow and wean as tolerated to maintain sats ?94%  Pan culture pending  TTE ordered  Trend troponin  Trend lactic aci  D/c Cardizem and start on Amio gtt  D/C Levophed and switch to Sulaiman  Received IVF bolus of 1449 in the ER  Cautious IVFs and monitor renal function and urine output  Insulin, calcium, D50 and bicarb for hyperkalemia and repeat BMP at midnight  ABG pending  Monitor renal function and urine output   Nephrology consulted  Restart anticoagulation when appropriate, INR 2.44 currently, repeat in the a.m.   Renal ultrasound  Cardiology consulted   Diurese when hemodynamically stable    PUD: Famotidine  Insulin:  Sliding scale  VTE:  SCDs  Nutrition: NPO              BAYLEE Soliz Dr.,  ICU attending on call, aware of ICU admission and agrees with plan of care    Critical care time 35 minutes excluding time for proceduers      Electronically signed by Amanda Kaba MD at 10/15/19 0941       [facility] Medications (active)       Dose Frequency Start End    amiodarone (CORDARONE) 150 mg in sodium chloride 0.9 % 150 mg Once 10/14/2019 10/14/2019    Sig - Route: Infuse 150 mg into a venous catheter 1 (One) Time. - Intravenous    amiodarone (CORDARONE) 150 MG/3ML injection  - ADS Override Pull   10/14/2019 10/15/2019    Notes to Pharmacy: Created by cabinet override    AMIODARONE HCL IN DEXTROSE 450-5 MG/250ML-% IV  "SOLN 1 mg/min Continuous 10/14/2019 10/15/2019    Sig - Route: Infuse 1 mg/min into a venous catheter Continuous. - Intravenous    Linked Group 1:  \"Followed by\" Linked Group Details        AMIODARONE HCL IN DEXTROSE 450-5 MG/250ML-% IV SOLN 0.5 mg/min Continuous 10/15/2019 10/15/2019    Sig - Route: Infuse 0.5 mg/min into a venous catheter Continuous. - Intravenous    Linked Group 1:  \"Followed by\" Linked Group Details        amiodarone in dextrose 5% (NEXTERONE) loading dose 150mg/100mL 150 mg Once 10/14/2019 10/14/2019    Sig - Route: Infuse 100 mL into a venous catheter 1 (One) Time. - Intravenous    Linked Group 1:  \"Followed by\" Linked Group Details        calcium gluconate 2 g/100 mL NS IVPB 2 g Once 10/14/2019 10/14/2019    Sig - Route: Infuse 100 mL into a venous catheter 1 (One) Time. - Intravenous    calcium gluconate 2 g/100 mL NS IVPB 2 g Once 10/15/2019 10/15/2019    Sig - Route: Infuse 100 mL into a venous catheter 1 (One) Time. - Intravenous    dexamethasone (DECADRON) 0.1 % ophthalmic suspension 1 drop 1 drop Every 8 Hours Scheduled 10/15/2019     Sig - Route: Administer 1 drop to both eyes Every 8 (Eight) Hours. - Both Eyes    dextrose (D50W) 25 g/ 50mL Intravenous Solution 25 g 25 g Every 15 Minutes PRN 10/14/2019     Sig - Route: Infuse 50 mL into a venous catheter Every 15 (Fifteen) Minutes As Needed for Low Blood Sugar (Blood Sugar Less Than 70). - Intravenous    dextrose (D50W) 25 g/ 50mL Intravenous Solution 50 mL 50 mL Once 10/14/2019 10/14/2019    Sig - Route: Infuse 50 mL into a venous catheter 1 (One) Time. - Intravenous    dextrose (GLUTOSE) oral gel 15 g 15 g Every 15 Minutes PRN 10/14/2019     Sig - Route: Take 15 application by mouth Every 15 (Fifteen) Minutes As Needed for Low Blood Sugar (Blood sugar less than 70). - Oral    digoxin (LANOXIN) injection 250 mcg 250 mcg Once 10/14/2019 10/14/2019    Sig - Route: Infuse 1 mL into a venous catheter 1 (One) Time. - Intravenous    " dilTIAZem (CARDIZEM) injection 10 mg 10 mg Once 10/14/2019 10/14/2019    Sig - Route: Infuse 2 mL into a venous catheter 1 (One) Time. - Intravenous    dilTIAZem HCl-Sodium Chloride (CARDIZEM) 125-0.9 MG/125ML-% infusion solution  - ADS Override Pull   10/14/2019 10/14/2019    Notes to Pharmacy: Created by cabinet override    famotidine (PEPCID) tablet 20 mg 20 mg Every Early Morning 10/15/2019     Sig - Route: Take 1 tablet by mouth Every Morning. - Oral    glucagon (human recombinant) (GLUCAGEN DIAGNOSTIC) injection 1 mg 1 mg Every 15 Minutes PRN 10/14/2019     Sig - Route: Inject 1 mg under the skin into the appropriate area as directed Every 15 (Fifteen) Minutes As Needed for Low Blood Sugar (Blood Glucose Less Than 70). - Subcutaneous    insulin lispro (humaLOG) injection 0-7 Units 0-7 Units Every 6 Hours Scheduled 10/15/2019     Sig - Route: Inject 0-7 Units under the skin into the appropriate area as directed Every 6 (Six) Hours. - Subcutaneous    insulin regular (humuLIN R,novoLIN R) injection 10 Units 10 Units Once 10/14/2019 10/14/2019    Sig - Route: Infuse 10 Units into a venous catheter 1 (One) Time. - Intravenous    levothyroxine (SYNTHROID, LEVOTHROID) tablet 75 mcg 75 mcg Every Morning Before Breakfast 10/15/2019     Sig - Route: Take 1 tablet by mouth Every Morning Before Breakfast. - Oral    meropenem (MERREM) 1 g in sodium chloride 0.9 % 100 mL IVPB 1 g Once 10/14/2019 10/14/2019    Sig - Route: Infuse 1 g into a venous catheter 1 (One) Time. - Intravenous    meropenem (MERREM) 500 mg in sodium chloride 0.9 % 100 mL IVPB 500 mg Every 24 Hours 10/15/2019 10/22/2019    Sig - Route: Infuse 500 mg into a venous catheter Daily. - Intravenous    metoprolol tartrate (LOPRESSOR) injection 2.5 mg 2.5 mg Once 10/15/2019 10/14/2019    Sig - Route: Infuse 2.5 mL into a venous catheter 1 (One) Time. - Intravenous    mirtazapine (REMERON) tablet 7.5 mg 7.5 mg Nightly 10/15/2019     Sig - Route: Take 0.5 tablets  by mouth Every Night. - Oral    ondansetron (ZOFRAN) injection 4 mg 4 mg Every 6 Hours PRN 10/15/2019     Sig - Route: Infuse 2 mL into a venous catheter Every 6 (Six) Hours As Needed for Nausea or Vomiting. - Intravenous    Pharmacy to Dose meropenem (MERREM)  Continuous PRN 10/14/2019 10/21/2019    Sig - Route: Continuous As Needed for Consult. - Does not apply    Pharmacy to dose vancomycin  Continuous PRN 10/15/2019 10/22/2019    Sig - Route: Continuous As Needed for Consult. - Does not apply    phenylephrine (AREN-SYNEPHRINE) 50 mg/250 mL (0.2 mg/mL) in 0.9% NS  infusion 0.5-3 mcg/kg/min × 48.3 kg Titrated 10/14/2019     Sig - Route: Infuse 24..9 mcg/min into a venous catheter Dose Adjusted By Provider As Needed. - Intravenous    polyethyl glycol-propyl glycol (SYSTANE) 0.4-0.3 % ophthalmic solution 1 drop 1 drop 2 Times Daily 10/15/2019     Sig - Route: Administer 1 drop to both eyes 2 (Two) Times a Day. - Both Eyes    sertraline (ZOLOFT) tablet 25 mg 25 mg Daily 10/15/2019     Sig - Route: Take 0.5 tablets by mouth Daily. - Oral    sodium bicarbonate 8.4 % 150 mEq in dextrose (D5W) 5 % 1,000 mL infusion (greater than 75 mEq) 150 mEq Continuous 10/15/2019     Sig - Route: Infuse 150 mEq into a venous catheter Continuous. - Intravenous    sodium bicarbonate injection 8.4% 100 mEq 100 mEq Once 10/15/2019 10/15/2019    Sig - Route: Infuse 100 mL into a venous catheter 1 (One) Time. - Intravenous    sodium bicarbonate injection 8.4% 50 mEq 50 mEq Once 10/14/2019 10/14/2019    Sig - Route: Infuse 50 mL into a venous catheter 1 (One) Time. - Intravenous    sodium chloride 0.9 % bolus 1,449 mL 30 mL/kg × 48.3 kg Once 10/14/2019 10/14/2019    Sig - Route: Infuse 1,449 mL into a venous catheter 1 (One) Time. - Intravenous    sodium chloride 0.9 % bolus 500 mL 500 mL Once 10/14/2019 10/14/2019    Sig - Route: Infuse 500 mL into a venous catheter 1 (One) Time. - Intravenous    sodium chloride 0.9 % flush 10 mL 10 mL  As Needed 10/14/2019     Sig - Route: Infuse 10 mL into a venous catheter As Needed for Line Care. - Intravenous    sodium chloride 0.9 % flush 10 mL 10 mL Every 12 Hours Scheduled 10/14/2019     Sig - Route: Infuse 10 mL into a venous catheter Every 12 (Twelve) Hours. - Intravenous    sodium chloride 0.9 % flush 10 mL 10 mL As Needed 10/14/2019     Sig - Route: Infuse 10 mL into a venous catheter As Needed for Line Care. - Intravenous    vancomycin 1000 mg/250 mL 0.9% NS IVPB ([facility]) 20 mg/kg × 53.7 kg Once 10/15/2019 10/15/2019    Sig - Route: Infuse 250 mL into a venous catheter 1 (One) Time. - Intravenous    vancomycin 750 mg in sodium chloride 0.9 % 100 mL IVPB 15 mg/kg × 53.7 kg As Needed 10/15/2019 10/22/2019    Sig - Route: Infuse 750 mg into a venous catheter As Needed (pharmacy will schedule when due). - Intravenous    apixaban (ELIQUIS) tablet 2.5 mg (Discontinued) 2.5 mg Every 12 Hours Scheduled 10/14/2019 10/15/2019    Sig - Route: Take 1 tablet by mouth Every 12 (Twelve) Hours. - Oral    diltiaZEM (CARDIZEM) 125mg/125 mL infusion (Discontinued) 5 mg/hr Titrated 10/14/2019 10/14/2019    Sig - Route: Infuse 5 mg/hr into a venous catheter Dose Adjusted By Provider As Needed. - Intravenous    norepinephrine (LEVOPHED) 8 mg/250 mL (32 mcg/mL) in sodium chloride 0.9% infusion (premix) (Discontinued) 0.02-0.3 mcg/kg/min × 48.3 kg Titrated 10/14/2019 10/14/2019    Sig - Route: Infuse 0.966-14.49 mcg/min into a venous catheter Dose Adjusted By Provider As Needed. - Intravenous    piperacillin-tazobactam (ZOSYN) IVPB 3.375 g in 100 mL NS (CD) (Discontinued) 3.375 g Once 10/14/2019 10/14/2019    Sig - Route: Infuse 3.375 g into a venous catheter 1 (One) Time. - Intravenous    sodium chloride 0.9 % infusion (Discontinued) 75 mL/hr Continuous 10/14/2019 10/15/2019    Sig - Route: Infuse 75 mL/hr into a venous catheter Continuous. - Intravenous             Physician Progress Notes (most recent note)      Haydee Meier  BAYLEE Cee at 10/15/19 0941          Pulmonary/ Critical Care progress Note        Patient Name:  Dinora Vásquez    :  1926    Medical Record:  [id number]    Primary Care Physician     Florentino Kimbrough MD [facility]  Dinora Vásquez is a 89 y.o. female who was presented to the ER after being seen earlier by Nephrology and found to be hypotensive and tachycardic; in addition she complained of dyspnea, abdominal pain and not feeling well.  She had a bolus of IVFs started and transferred to the ER.  Workup in the ER revealed afib with RVR, UTI, septic shock, CHF, STEMI, acute hypoxic respiratory failure and LEYLA.  She had IVFs per sepsis protocol given and started on Meropenem.  She was given a bolus of amio and Cardizem without improvement in her heart rate and subsequently developed hypotension.  She had a PICC ordered by the ER and was started on Levophed.   The patient is currently difficult to get a history from as she is obtunded.  Her sats were in the 70's and she was started on Precision Flow with improvement.      10/15/19:  No issues overnight.      Review of Systems    As above        Home medicationS  Prior to Admission medications    Medication Sig Start Date End Date Taking? Authorizing Provider   cholecalciferol (VITAMIN D3) 1000 units tablet Daily. 17   Aravind Silva MD   Cranberry 600 MG tablet Take 600 mg by mouth 3 (Three) Times a Day.    Aravind Silva MD   ELIQUIS 2.5 MG tablet tablet Take 2.5 mg by mouth 2 (Two) Times a Day. 19   Aravind Silva MD   fluorometholone (FML) 0.1 % ophthalmic suspension Administer 1 Drop/kg to both eyes Daily. 19   Aravind Silva MD   levothyroxine (SYNTHROID, LEVOTHROID) 75 MCG tablet Every Morning Before Breakfast. 19   Aravind Silva MD   loratadine (CLARITIN) 10 MG tablet Take 10 mg by mouth Daily.    Aravind Silva MD   Magnesium 400 MG tablet MAGNESIUM 400 MG TABS 18   Shira  MD Aravind   metoprolol tartrate (LOPRESSOR) 50 MG tablet Take 50 mg by mouth 2 (Two) Times a Day. 6/11/19   Aravind Silva MD   mirtazapine (REMERON) 7.5 MG tablet 7.5 mg Daily. 6/18/19   Aravind Silva MD   polyvinyl alcohol (ARTIFICIAL TEARS) 1.4 % ophthalmic solution Every 12 (Twelve) Hours. 11/2/18   Aravind Silva MD   Prenatal w/o A Vit-Fe Fum-FA (BP MULTINATAL PLUS) 30-1 MG tablet Daily. 12/19/17   Aravind Silva MD   saccharomyces boulardii (FLORASTOR) 250 MG capsule FLORASTOR 250 MG CAPS 6/11/18   Aravind Silva MD   sertraline (ZOLOFT) 25 MG tablet Take 25 mg by mouth Daily. 6/11/19   Aravind Silva MD       Medical History    Past Medical History:   Diagnosis Date   • Ankle wound, right, initial encounter    • Aortic stenosis    • Atrial fibrillation (CMS/HCC)    • Coronary artery disease    • DVT (deep venous thrombosis) (CMS/HCC)    • Hypothyroidism    • Left bundle branch block    • Pulmonary embolus (CMS/HCC)    • Pulmonary hypertension (CMS/HCC)    • Sinus bradycardia         Surgical History    Past Surgical History:   Procedure Laterality Date   • COLOSTOMY     • ORIF ANKLE FRACTURE Right 2013    Right Ankle ORIF    • PACEMAKER IMPLANTATION  11/29/2017    Dual Chamber [employer]   • TOTAL ABDOMINAL HYSTERECTOMY          Family History    Family History   Problem Relation Age of Onset   • Heart disease Mother    • Diabetes Mother    • Heart disease Sister    • Diabetes Sister        Social History    Social History     Tobacco Use   • Smoking status: Never Smoker   • Smokeless tobacco: Never Used   Substance Use Topics   • Alcohol use: No     Frequency: Never        Allergies    Allergies   Allergen Reactions   • Cephalexin Swelling   • Sulfadiazine Rash   • Trimethoprim Hives   • Trospium Hives       Medications    Scheduled Meds:    calcium gluconate 2 g Intravenous Once   famotidine 20 mg Oral Q AM   insulin lispro 0-7 Units Subcutaneous Q6H    meropenem 500 mg Intravenous Q24H   sodium chloride 10 mL Intravenous Q12H     Continuous Infusions:    amiodarone 0.5 mg/min Last Rate: 0.5 mg/min (10/15/19 0559)   Pharmacy to Dose meropenem (MERREM)     Pharmacy to dose vancomycin     phenylephrine 0.5-3 mcg/kg/min Last Rate: 2 mcg/kg/min (10/15/19 0559)   sodium bicarbonate drip (greater than 75 mEq/bag) 150 mEq Last Rate: 150 mEq (10/15/19 0030)   sodium chloride 75 mL/hr Last Rate: 75 mL/hr (10/14/19 2045)     PRN Meds:.dextrose  •  dextrose  •  glucagon (human recombinant)  •  ondansetron  •  Pharmacy to Dose meropenem (MERREM)  •  Pharmacy to dose vancomycin  •  sodium chloride  •  sodium chloride  •  vancomycin      Physical Exam    tMax 24 hrs:  Temp (24hrs), Av.8 °F (36.6 °C), Min:97.5 °F (36.4 °C), Max:98 °F (36.7 °C)    Vitals Ranges:  Temp:  [97.5 °F (36.4 °C)-98 °F (36.7 °C)] 98 °F (36.7 °C)  Heart Rate:  [] 111  Resp:  [16-29] 29  BP: ()/() 127/55  Intake and Output Last 3 Shifts:  I/O last 3 completed shifts:  In: 3743 [I.V.:2294; IV Piggyback:1449]  Out: 300 [Urine:200; Stool:100]    Constitution:  NAD   HEENT:  Atraumatic, PERRL, conjunctiva normal, moist oral mucosa, no nasal discharge.  Trachea is midline.  Respiratory:  No respiratory distress. Diminished breath sounds bilaterally.    Cardiovascular:  Irregular, tachy.   Pulses 2+ and equal in all four extremities.    GI:  Soft, nondistended.  Nontender to palpation.   Ileostomy  with stool output noted.   Extremities: No edema, cyanosis or tenderness.  Integument:  No rashes.   Neurologic:  Alert and oriented x 3.  Moves all four extremities to painful stimuli.   No focal neurologic deficits observed.      labs    Lab Results (last 24 hours)     Procedure Component Value Units Date/Time    Lactic Acid, Plasma [[id number]]  (Abnormal) Collected:  10/15/19 0803    Specimen:  Blood Updated:  10/15/19 0901     Lactate 5.2 mmol/L      Comment: Result checked        S. Pneumo  Ag Urine or CSF - Urine, Urine, Catheter [[id number]]  (Normal) Collected:  10/14/19 1929    Specimen:  Urine, Catheter Updated:  10/15/19 0831     Strep Pneumo Ag Negative    Legionella Antigen, Urine - Urine, Urine, Catheter [[id number]]  (Normal) Collected:  10/14/19 1929    Specimen:  Urine, Catheter Updated:  10/15/19 0830     LEGIONELLA ANTIGEN, URINE Negative    Calcium, Ionized [[id number]]  (Abnormal) Collected:  10/15/19 0814    Specimen:  Blood Updated:  10/15/19 0830     Ionized Calcium 0.96 mmol/L     [place] Draw [[id number]] Collected:  10/14/19 1619    Specimen:  Blood Updated:  10/15/19 0729    Narrative:       The following orders were created for panel order [place] Draw.  Procedure                               Abnormality         Status                     ---------                               -----------         ------                     Light Blue Top[[id number]]                                   Final result               Green Top (Gel)[[id number]]                                  Final result               Lavender Top[[id number]]                                                                Gold Top - SST[[id number]]                                   Final result                 Please view results for these tests on the individual orders.    Protime-INR [[id number]]  (Abnormal) Collected:  10/15/19 0423    Specimen:  Blood Updated:  10/15/19 0604     Protime 29.6 Seconds      INR 3.14    CBC & Differential [[id number]] Collected:  10/15/19 0424    Specimen:  Blood Updated:  10/15/19 0558    Narrative:       The following orders were created for panel order CBC & Differential.  Procedure                               Abnormality         Status                     ---------                               -----------         ------                     CBC Auto Differential[[id number]]        Abnormal            Final result                 Please view results for these tests on the individual  orders.    CBC Auto Differential [[id number]]  (Abnormal) Collected:  10/15/19 0424    Specimen:  Blood Updated:  10/15/19 0558     WBC 24.90 10*3/mm3      RBC 3.72 10*6/mm3      Hemoglobin 11.2 g/dL      Hematocrit 36.4 %      MCV 97.9 fL      MCH 30.0 pg      MCHC 30.6 g/dL      RDW 17.1 %      RDW-SD 58.2 fl      MPV 9.1 fL      Platelets 185 10*3/mm3     Scan Slide [[id number]] Collected:  10/15/19 0424    Specimen:  Blood Updated:  10/15/19 0558     Scan Slide --     Comment: See Manual Differential Results       Manual Differential [[id number]]  (Abnormal) Collected:  10/15/19 0424    Specimen:  Blood Updated:  10/15/19 0558     Neutrophil % 84.0 %      Lymphocyte % 4.0 %      Monocyte % 6.0 %      Bands %  6.0 %      Neutrophils Absolute 22.41 10*3/mm3      Lymphocytes Absolute 1.00 10*3/mm3      Monocytes Absolute 1.49 10*3/mm3      nRBC 2.0 /100 WBC      Dhruv Cells Mod/2+     Polychromasia Slight/1+     Toxic Granulation Mod/2+     Vacuolated Neutrophils Slight/1+     Platelet Morphology Normal    POC Glucose Once [[id number]]  (Abnormal) Collected:  10/15/19 0544    Specimen:  Blood Updated:  10/15/19 0545     Glucose 161 mg/dL      Comment: Serial Number: [id number]:  [id number]       Troponin [[id number]]  (Abnormal) Collected:  10/15/19 0424    Specimen:  Blood Updated:  10/15/19 0526     Troponin I 0.430 ng/mL      Comment: Result checked        Narrative:       Troponin I Reference Range:    0.00-0.03  Negative.  Repeat testing in 4-6 hours if clinically indicated.    0.04-0.29  Suspicious for myocardial injury. Serial measurements and clinical  correlation may be necessary to confirm or exclude diagnosis of acute  coronary syndrome.  Repeat testing in 4-6 hours if indicated.     >0.29 Consistent with myocardial injury.  Recommend clinical and laboratory correlation.     Results my be falsely decreased if patient taking Biotin.     Lactic Acid, Plasma [[id number]]  (Abnormal) Collected:  10/15/19 0424     Specimen:  Blood Updated:  10/15/19 0519     Lactate 8.6 mmol/L      Comment: Result checked        Phosphorus [[id number]]  (Normal) Collected:  10/15/19 0424    Specimen:  Blood Updated:  10/15/19 0505     Phosphorus 4.3 mg/dL     Basic Metabolic Panel [[id number]]  (Abnormal) Collected:  10/15/19 0424    Specimen:  Blood Updated:  10/15/19 0500     Glucose 161 mg/dL      BUN 69 mg/dL      Creatinine 3.50 mg/dL      Sodium 147 mmol/L      Potassium 4.6 mmol/L      Chloride 107 mmol/L      CO2 20.0 mmol/L      Calcium 7.3 mg/dL      eGFR   Amer --     Comment: <15 Indicative of kidney failure.        eGFR Non African Amer 12 mL/min/1.73      Comment: <15 Indicative of kidney failure.        BUN/Creatinine Ratio 19.7     Anion Gap 24.6 mmol/L     Narrative:       The MDRD GFR formula is only valid for adults with stable renal function between ages 18 and 70.    Magnesium [[id number]]  (Abnormal) Collected:  10/15/19 0424    Specimen:  Blood Updated:  10/15/19 0458     Magnesium 2.8 mg/dL     Blood Gas, Arterial [[id number]]  (Abnormal) Collected:  10/15/19 0426    Specimen:  Arterial Blood Updated:  10/15/19 0428     Site Left Brachial     John's Test N/A     pH, Arterial 7.383 pH units      pCO2, Arterial 32.0 mm Hg      pO2, Arterial 93.3 mm Hg      HCO3, Arterial 19.1 mmol/L      Base Excess, Arterial -5.1 mmol/L      Comment: Serial Number: [id number]:  [id number]        O2 Saturation, Arterial 97.2 %      CO2 Content 20.1 mmol/L      Barometric Pressure for Blood Gas --     Comment: N/A        Modality Vapotherm     FIO2 60 %      Hemodilution No    Basic Metabolic Panel [[id number]]  (Abnormal) Collected:  10/15/19 0029    Specimen:  Blood Updated:  10/15/19 0123     Glucose 101 mg/dL      BUN 67 mg/dL      Creatinine 3.70 mg/dL      Sodium 148 mmol/L      Potassium 4.3 mmol/L      Chloride 109 mmol/L      CO2 15.0 mmol/L      Calcium 7.5 mg/dL      eGFR   Amer --     Comment: <15 Indicative of  kidney failure.        eGFR Non African Amer 11 mL/min/1.73      Comment: <15 Indicative of kidney failure.        BUN/Creatinine Ratio 18.1     Anion Gap 28.3 mmol/L     Narrative:       The MDRD GFR formula is only valid for adults with stable renal function between ages 18 and 70.    CBC (No Diff) [[id number]]  (Abnormal) Collected:  10/15/19 0034    Specimen:  Blood Updated:  10/15/19 0048     WBC 21.70 10*3/mm3      RBC 3.55 10*6/mm3      Hemoglobin 11.0 g/dL      Comment: Result checked         Hematocrit 35.1 %      MCV 98.9 fL      MCH 30.9 pg      MCHC 31.2 g/dL      RDW 17.5 %      RDW-SD 60.8 fl      MPV 8.7 fL      Platelets 187 10*3/mm3     Troponin [[id number]]  (Abnormal) Collected:  10/14/19 2333    Specimen:  Blood Updated:  10/15/19 0029     Troponin I 0.340 ng/mL     Narrative:       Troponin I Reference Range:    0.00-0.03  Negative.  Repeat testing in 4-6 hours if clinically indicated.    0.04-0.29  Suspicious for myocardial injury. Serial measurements and clinical  correlation may be necessary to confirm or exclude diagnosis of acute  coronary syndrome.  Repeat testing in 4-6 hours if indicated.     >0.29 Consistent with myocardial injury.  Recommend clinical and laboratory correlation.     Results my be falsely decreased if patient taking Biotin.     Lactic Acid, Plasma [[id number]]  (Abnormal) Collected:  10/14/19 2333    Specimen:  Blood Updated:  10/15/19 0029     Lactate 13.2 mmol/L     Blood Gas, Arterial [[id number]]  (Abnormal) Collected:  10/14/19 2358    Specimen:  Arterial Blood Updated:  10/15/19 0006     Site Left Brachial     John's Test N/A     pH, Arterial 7.151 pH units      pCO2, Arterial 22.2 mm Hg      pO2, Arterial 91.2 mm Hg      HCO3, Arterial 7.7 mmol/L      Base Excess, Arterial -19.3 mmol/L      Comment: Serial Number: [id number]:  [id number]        O2 Saturation, Arterial 94.4 %      CO2 Content 8.4 mmol/L      Barometric Pressure for Blood Gas --     Comment: N/A         Modality Vapotherm     FIO2 60 %      Hemodilution No    POC Glucose Once [[id number]]  (Abnormal) Collected:  10/14/19 2331    Specimen:  Blood Updated:  10/14/19 2332     Glucose 120 mg/dL      Comment: Serial Number: [id number]:  [id number]       Respiratory Panel, PCR - Swab, Nasopharynx [[id number]]  (Normal) Collected:  10/14/19 1951    Specimen:  Swab from Nasopharynx Updated:  10/14/19 2236     ADENOVIRUS, PCR Not Detected     Coronavirus 229E Not Detected     Coronavirus HKU1 Not Detected     Coronavirus NL63 Not Detected     Coronavirus OC43 Not Detected     Human Metapneumovirus Not Detected     Human Rhinovirus/Enterovirus Not Detected     Influenza B PCR Not Detected     Parainfluenza Virus 1 Not Detected     Parainfluenza Virus 2 Not Detected     Parainfluenza Virus 3 Not Detected     Parainfluenza Virus 4 Not Detected     Bordetella pertussis pcr Not Detected     Influenza A H1 2009 PCR Not Detected     Chlamydophila pneumoniae PCR Not Detected     Mycoplasma pneumo by PCR Not Detected     Influenza A PCR Not Detected     Influenza A H3 Not Detected     Influenza A H1 Not Detected     RSV, PCR Not Detected    Comprehensive Metabolic Panel [[id number]]  (Abnormal) Collected:  10/14/19 1647    Specimen:  Blood Updated:  10/14/19 2105     Glucose 142 mg/dL      BUN 71 mg/dL      Creatinine 3.80 mg/dL      Sodium 144 mmol/L      Potassium 5.2 mmol/L      Chloride 102 mmol/L      CO2 14.0 mmol/L      Calcium 8.2 mg/dL      Total Protein 6.2 g/dL      Albumin 3.50 g/dL      ALT (SGPT) <5 U/L      AST (SGOT) 51 U/L      Alkaline Phosphatase 97 U/L      Total Bilirubin 2.3 mg/dL      eGFR Non African Amer 11 mL/min/1.73      Comment: <15 Indicative of kidney failure.        eGFR   Amer --     Comment: <15 Indicative of kidney failure.        Globulin 2.7 gm/dL      A/G Ratio 1.3 g/dL      BUN/Creatinine Ratio 18.7     Anion Gap 33.2 mmol/L     Narrative:       The MDRD GFR formula is only valid for  adults with stable renal function between ages 18 and 70.    MRSA Screen Culture - Swab, Nares [[id number]] Collected:  10/14/19 2036    Specimen:  Swab from Nares Updated:  10/14/19 2039    Comprehensive Metabolic Panel [[id number]]  (Abnormal) Collected:  10/14/19 1619    Specimen:  Blood Updated:  10/14/19 2017     Glucose 152 mg/dL      BUN 71 mg/dL      Creatinine 4.00 mg/dL      Sodium 142 mmol/L      Potassium 5.2 mmol/L      Chloride 103 mmol/L      CO2 12.0 mmol/L      Calcium 8.0 mg/dL      Total Protein 6.4 g/dL      Albumin 3.50 g/dL      ALT (SGPT) <5 U/L      AST (SGOT) 51 U/L      Alkaline Phosphatase 97 U/L      Total Bilirubin 2.1 mg/dL      eGFR Non African Amer 10 mL/min/1.73      Comment: <15 Indicative of kidney failure.        eGFR   Amer --     Comment: <15 Indicative of kidney failure.        Globulin 2.9 gm/dL      A/G Ratio 1.2 g/dL      BUN/Creatinine Ratio 17.8     Anion Gap 32.2 mmol/L     Narrative:       The MDRD GFR formula is only valid for adults with stable renal function between ages 18 and 70.    Urinalysis With Culture If Indicated - Urine, Catheter [[id number]]  (Abnormal) Collected:  10/14/19 1929    Specimen:  Urine, Catheter Updated:  10/14/19 2015     Color, UA Dark Yellow     Appearance, UA Turbid     Comment: Result checked         pH, UA 5.5     Specific Gravity, UA 1.016     Glucose, UA Negative     Ketones, UA Trace     Bilirubin, UA Small (1+)     Comment: Confirmation testing is unavailable.  A serum bilirubin is recommended for further assessment.        Blood, UA Moderate (2+)     Protein, UA 30 mg/dL (1+)     Leuk Esterase, UA Large (3+)     Nitrite, UA Negative     Urobilinogen, UA 0.2 E.U./dL    Urinalysis, Microscopic Only - Urine, Catheter [[id number]]  (Abnormal) Collected:  10/14/19 1929    Specimen:  Urine, Catheter Updated:  10/14/19 2015     RBC, UA 3-5 /HPF      WBC, UA Too Numerous to Count /HPF      Bacteria, UA 3+ /HPF      Squamous Epithelial  Cells, UA 3-6 /HPF      Hyaline Casts, UA 3-6 /LPF      Granular Casts, UA 0-2 /LPF      Methodology Manual Light Microscopy    Urine Culture - Urine, Urine, Catheter [[id number]] Collected:  10/14/19 1929    Specimen:  Urine, Catheter Updated:  10/14/19 2015    POC Lactate [[id number]]  (Abnormal) Collected:  10/14/19 1910    Specimen:  Blood Updated:  10/14/19 1912     Lactate 9.6 mmol/L      Comment: Serial Number: [id number]:  [id number]       Magnesium [[id number]]  (Abnormal) Collected:  10/14/19 1619    Specimen:  Blood Updated:  10/14/19 1857     Magnesium 3.4 mg/dL     Phosphorus [[id number]]  (Abnormal) Collected:  10/14/19 1619    Specimen:  Blood Updated:  10/14/19 1857     Phosphorus 6.5 mg/dL     C-reactive Protein [[id number]]  (Abnormal) Collected:  10/14/19 1619    Specimen:  Blood Updated:  10/14/19 1857     C-Reactive Protein 11.81 mg/dL     Calcium, Ionized [[id number]]  (Abnormal) Collected:  10/14/19 1647    Specimen:  Blood Updated:  10/14/19 1808     Ionized Calcium 1.02 mmol/L     Light Blue Top [[id number]] Collected:  10/14/19 1619    Specimen:  Blood Updated:  10/14/19 1800     Extra Tube hold for add-on     Comment: Auto resulted       Green Top (Gel) [[id number]] Collected:  10/14/19 1619    Specimen:  Blood Updated:  10/14/19 1800     Extra Tube Hold for add-ons.     Comment: Auto resulted.       Gold Top - SST [[id number]] Collected:  10/14/19 1647    Specimen:  Blood Updated:  10/14/19 1800     Extra Tube Hold for add-ons.     Comment: Auto resulted.       Troponin [[id number]]  (Abnormal) Collected:  10/14/19 1620    Specimen:  Blood Updated:  10/14/19 1742     Troponin I 0.340 ng/mL     Narrative:       Troponin I Reference Range:    0.00-0.03  Negative.  Repeat testing in 4-6 hours if clinically indicated.    0.04-0.29  Suspicious for myocardial injury. Serial measurements and clinical  correlation may be necessary to confirm or exclude diagnosis of acute  coronary syndrome.  Repeat  testing in 4-6 hours if indicated.     >0.29 Consistent with myocardial injury.  Recommend clinical and laboratory correlation.     Results my be falsely decreased if patient taking Biotin.     CBC & Differential [[id number]] Collected:  10/14/19 1620    Specimen:  Blood Updated:  10/14/19 1727    Narrative:       The following orders were created for panel order CBC & Differential.  Procedure                               Abnormality         Status                     ---------                               -----------         ------                     CBC Auto Differential[[id number]]        Abnormal            Final result                 Please view results for these tests on the individual orders.    CBC Auto Differential [[id number]]  (Abnormal) Collected:  10/14/19 1620    Specimen:  Blood Updated:  10/14/19 1727     WBC 18.80 10*3/mm3      RBC 4.29 10*6/mm3      Hemoglobin 13.1 g/dL      Hematocrit 44.9 %      .8 fL      MCH 30.7 pg      MCHC 29.3 g/dL      RDW 19.3 %      RDW-SD 71.8 fl      MPV 9.4 fL      Platelets 210 10*3/mm3      Neutrophil % 88.0 %      Lymphocyte % 3.7 %      Monocyte % 8.1 %      Eosinophil % 0.0 %      Basophil % 0.2 %      Neutrophils, Absolute 16.50 10*3/mm3      Lymphocytes, Absolute 0.70 10*3/mm3      Monocytes, Absolute 1.50 10*3/mm3      Eosinophils, Absolute 0.00 10*3/mm3      Basophils, Absolute 0.00 10*3/mm3      nRBC 0.5 /100 WBC     Blood Culture - Blood, Blood, Venous Line [[id number]] Collected:  10/14/19 1716    Specimen:  Blood, Venous Line Updated:  10/14/19 1724    Protime-INR [[id number]]  (Abnormal) Collected:  10/14/19 1619    Specimen:  Blood Updated:  10/14/19 1723     Protime 23.1 Seconds      INR 2.44    aPTT [[id number]]  (Normal) Collected:  10/14/19 1619    Specimen:  Blood Updated:  10/14/19 1723     PTT 26.2 seconds     BNP [[id number]]  (Abnormal) Collected:  10/14/19 1619    Specimen:  Blood Updated:  10/14/19 1722     BNP 1,784.0 pg/mL      Comment:  Results may be falsely decreased if patient taking Biotin.       POC Lactate [[id number]]  (Abnormal) Collected:  10/14/19 1654    Specimen:  Blood Updated:  10/14/19 1657     Lactate 9.2 mmol/L      Comment: Serial Number: [id number]:  [id number]       POC Lactate [[id number]]  (Abnormal) Collected:  10/14/19 1653    Specimen:  Blood Updated:  10/14/19 1657     Lactate 10.0 mmol/L      Comment: Serial Number: [id number]:  [id number]       POC Lactate [[id number]]  (Abnormal) Collected:  10/14/19 1651    Specimen:  Blood Updated:  10/14/19 1656     Lactate 9.1 mmol/L      Comment: Serial Number: [id number]:  [id number]       Lactic Acid, Reflex Timer (This will reflex a repeat order 3-3:15 hours after ordered.) [[id number]] Collected:  10/14/19 1651    Specimen:  Blood Updated:  10/14/19 1656    Blood Gas, Arterial [[id number]]  (Abnormal) Collected:  10/14/19 1646    Specimen:  Arterial Blood Updated:  10/14/19 1655     Site Left Brachial     John's Test N/A     pH, Arterial 7.185 pH units      pCO2, Arterial 31.9 mm Hg      pO2, Arterial 19.5 mm Hg      HCO3, Arterial 12.0 mmol/L      Base Excess, Arterial -15.1 mmol/L      Comment: Serial Number: [id number]:  [id number]        O2 Saturation, Arterial 21.8 %      CO2 Content 13.0 mmol/L      Barometric Pressure for Blood Gas --     Comment: N/A        Modality Cannula     FIO2 44 %      Hemodilution No    Blood Culture - Blood, Arm, Right [[id number]] Collected:  10/14/19 1647    Specimen:  Blood from Arm, Right Updated:  10/14/19 1650          Imaging & Other Studies    Imaging Results (last 72 hours)     Procedure Component Value Units Date/Time    CT Abdomen Pelvis Without Contrast [[id number]] Collected:  10/14/19 1912     Updated:  10/14/19 1925    Narrative:          DATE OF EXAM:  10/14/2019 6:53 PM     PROCEDURE:  CT ABDOMEN PELVIS WO CONTRAST-     INDICATIONS:  pain; A41.9-Sepsis, unspecified organism; R65.21-Severe sepsis with  septic shock      COMPARISON:  CT scan of the abdomen and pelvis 02/16/2019     TECHNIQUE:  Routine transaxial slices were obtained through the abdomen and pelvis  without the administration of intravenous contrast. Reconstructed  coronal and sagittal images were also obtained. Automated exposure  control and iterative construction methods were used.     FINDINGS:  There are moderate to large bilateral pleural effusions with associated  bibasilar dependent subsegmental atelectasis which are worsened from the  previous CT scan. There is a transvenous pacemaker. The liver and spleen  and adrenal glands and pancreas and kidneys appear normal. There is free  fluid deep in the pelvis unusual for a patient of this age. The patient  appears to have had a subtotal colectomy with only the rectum and distal  most aspect of the sigmoid colon remaining. There are scattered small  bowel air-fluid levels in nondistended loops. The small bowel within the  subcutaneous tissues passing from the abdominal wall to the skin does  show some redundancy however there is no significant small bowel  distention to suggest obstruction. There is mild diffuse increased soft  tissue stranding in the subcutaneous fat and mesenteric fat. There are  degenerative changes of the lumbar spine.       Impression:          1. Subtotal colectomy. There is an ileostomy in the right lower  quadrant. The subcutaneous portion of the ileal loop is slightly  redundant however there is no small bowel distention to suggest  obstruction. There are some scattered small bowel air-fluid levels in  nondistended loops which could reflect an associated mild ileus or  enteritis.  2. Moderate to large bilateral pleural effusions and dependent bibasilar  atelectasis. In addition there is a mild amount of ascites deep in the  pelvis and there is diffuse increased soft tissue stranding in the  subcutaneous fat and mesenteric fat. The constellation of these findings  does raise concern for  changes of congestive heart failure.  3. Incidental note is made of a 3 cm right lateral hernia containing  mesenteric fat.     Electronically Signed ByIsrael Carpenter On:10/14/2019 7:16 PM  This report was finalized on [id number] by  Ulysses Carpenter, .    XR Chest 1 View [[id number]] Collected:  10/14/19 1902     Updated:  10/14/19 1905    Narrative:       DATE OF EXAM:  10/14/2019 6:45 PM     PROCEDURE:  XR CHEST 1 VW-     INDICATIONS:  picc tip verification     COMPARISON: Chest x-ray 10/14/2019 at 5:00 PM     TECHNIQUE:   Single radiographic AP view of the chest was obtained.     FINDINGS:  There is been placement of a right PICC line with the tip in the  superior vena cava. There is cardiomegaly with a transvenous pacemaker.  There are extensive bilateral alveolar infiltrates and pleural effusions  with pulmonary vascular congestion which have worsened from the previous  study.        Impression:       Under  1. PICC line in good position with the tip in the superior vena cava.  2. Findings suggesting changes of pulmonary edema and congestive failure  which are worsened from the previous study.     Electronically Signed ByIsrael Carpenter On:10/14/2019 7:03 PM  This report was finalized on [id number] by  Ulysses Carpenter, .    XR Chest 1 View [[id number]] Collected:  10/14/19 1713     Updated:  10/14/19 1716    Narrative:       DATE OF EXAM:  10/14/2019 5:00 PM     PROCEDURE:  XR CHEST 1 VW-     INDICATIONS:  soa     COMPARISON:  Chest x-ray 02/11/2019     TECHNIQUE:   Single radiographic AP view of the chest was obtained.     FINDINGS:  There is cardiomegaly with a transvenous pacemaker. There is no  significant pulmonary vascular congestion however there are extensive  bibasilar areas of infiltrate and consolidation and effusion. There is a  possible PICC line on the right with the tip in the right axillary vein.          Impression:       Renomegaly. Basilar infiltrates and effusions. The appearance  is  somewhat more suggestive of changes of pulmonary edema and congestive  failure. An underlying pneumonia cannot be excluded.     Electronically Signed By-Ulysses Carpenter On:10/14/2019 5:14 PM  This report was finalized on [id number] by  Ulysses Carpenter, .          Assessment/plan  Septic shock likely related to urosepsis with hx of ESBL E. Coli UTI  Acute hypoxic respiratory failure  Leukocytosis   Bilateral pleural effusions   Hyperkalemia  LEYLA  Atrial fib with RVR  Acute hypoxic respiratory failure  H/o Hypertension  H/o LLE DVT and PE  New onset CHF  Chronic anticoagulation with Eliquis  ?Ileus  H/o ileostomy r/t C. Diff  NSTEMI    Plan:    -cont Merrem and vanco (hx of ESBL UTI)  -Continue Precision Flow and wean as tolerated to maintain sats 94%  -CXR with pulmonary edema  -ABGs reviewed   -on AMIO gtt   -cont vasopressor of AREN, for MAP goal 65  -resp viral panel negative   -Urine cx pending  -blood cx pending   -TTE pendng  -troponin 0.43  -lactic acid 5.2  -replace electrolytes as needed   -IVF bolus of 1449 in the ER  -Insulin, calcium, D50 and bicarb for hyperkalemia, K+ 4.3  -Nephrology consulted  -on HCO3 gtt  -restart anticoagulation when appropriate, INR 3.14 (Eliquis home med)  -elevated LFTs, monitor - possible shock liver   -D/C IVFs   -Renal ultrasound negative  -Cardiology consulted   -Diureses per renal  -bedside swallow eval for diet  -PT eval    PICC    PUD: Famotidine  Insulin:  Sliding scale  VTE:  SCDs  Nutrition: NPO - swallow eval per RN    DNR        Electronically signed by Haydee Meier APRN at 10/15/19 0949          Consult Notes (most recent note)      Margarita Gallardo MD at 10/15/19 0930      Consult Orders    1. Inpatient Nephrology Consult [[id number]] ordered by Jim Renae APRN at 10/15/19 0511                        Nephrology Consult Note                                                [facility]      Patient Identification:  Name: Dinora PADILLA  Fahad  Age: 89 y.o.  Sex: female  :  1926  MRN: [id number]               Requesting Physician: Amanda Kaba MD  Reason for Consultation: management recommendations      History of Present Illness:    Patient is a 89-year-old white female known to me from visit yesterday at outpatient where she was found to be hypotensive in the 70s systolic and tachycardia 140s into the emergency room and being consulted on her to manage acute kidney injury with metabolic acidosis patient is now on pressors amiodarone drip she is awake on oxygen nasal cannula  She was found to be in A. fib with rapid ventricular response UTI septic shock congestive heart failure with an elevated troponin with NSTEMI  Been asked to manage acute kidney injury with metabolic acidosis  Problem List:  Patient Active Problem List   Diagnosis   • Septic shock (CMS/HCC)     Past Medical History:  Past Medical History:   Diagnosis Date   • Ankle wound, right, initial encounter    • Aortic stenosis    • Atrial fibrillation (CMS/HCC)    • Coronary artery disease    • DVT (deep venous thrombosis) (CMS/HCC)    • Hypothyroidism    • Left bundle branch block    • Pulmonary embolus (CMS/HCC)    • Pulmonary hypertension (CMS/HCC)    • Sinus bradycardia      Past Surgical History:  Past Surgical History:   Procedure Laterality Date   • COLOSTOMY     • ORIF ANKLE FRACTURE Right     Right Ankle ORIF    • PACEMAKER IMPLANTATION  2017    Dual Chamber [employer]   • TOTAL ABDOMINAL HYSTERECTOMY        Home Meds:  Medications Prior to Admission   Medication Sig Dispense Refill Last Dose   • cholecalciferol (VITAMIN D3) 1000 units tablet Daily.      • Cranberry 600 MG tablet Take 600 mg by mouth 3 (Three) Times a Day.      • ELIQUIS 2.5 MG tablet tablet Take 2.5 mg by mouth 2 (Two) Times a Day.      • fluorometholone (FML) 0.1 % ophthalmic suspension Administer 1 Drop/kg to both eyes Daily.      • levothyroxine (SYNTHROID, LEVOTHROID) 75  MCG tablet Every Morning Before Breakfast.      • loratadine (CLARITIN) 10 MG tablet Take 10 mg by mouth Daily.      • Magnesium 400 MG tablet MAGNESIUM 400 MG TABS      • metoprolol tartrate (LOPRESSOR) 50 MG tablet Take 50 mg by mouth 2 (Two) Times a Day.      • mirtazapine (REMERON) 7.5 MG tablet 7.5 mg Daily.      • polyvinyl alcohol (ARTIFICIAL TEARS) 1.4 % ophthalmic solution Every 12 (Twelve) Hours.      • Prenatal w/o A Vit-Fe Fum-FA (BP MULTINATAL PLUS) 30-1 MG tablet Daily.      • saccharomyces boulardii (FLORASTOR) 250 MG capsule FLORASTOR 250 MG CAPS      • sertraline (ZOLOFT) 25 MG tablet Take 25 mg by mouth Daily.        Current Meds:     Current Facility-Administered Medications:   •  [COMPLETED] amiodarone in dextrose 5% (NEXTERONE) loading dose 150mg/100mL, 150 mg, Intravenous, Once, Last Rate: 600 mL/hr at 10/14/19 2125, 150 mg at 10/14/19 2125 **FOLLOWED BY** [] AMIODARONE HCL IN DEXTROSE 450-5 MG/250ML-% IV SOLN, 1 mg/min, Intravenous, Continuous, Last Rate: 33.3 mL/hr at 10/14/19 2147, 1 mg/min at 10/14/19 2147 **FOLLOWED BY** AMIODARONE HCL IN DEXTROSE 450-5 MG/250ML-% IV SOLN, 0.5 mg/min, Intravenous, Continuous, Jim Renae APRN, Last Rate: 16.67 mL/hr at 10/15/19 0559, 0.5 mg/min at 10/15/19 0559  •  calcium gluconate 2 g/100 mL NS IVPB, 2 g, Intravenous, Once, Haydee Meier APRN  •  dextrose (D50W) 25 g/ 50mL Intravenous Solution 25 g, 25 g, Intravenous, Q15 Min PRN, Jim Renae APRN  •  dextrose (GLUTOSE) oral gel 15 g, 15 g, Oral, Q15 Min PRN, Jim Renae APRN  •  famotidine (PEPCID) tablet 20 mg, 20 mg, Oral, Q AM, Jim Renae APRN, 20 mg at 10/15/19 0559  •  glucagon (human recombinant) (GLUCAGEN DIAGNOSTIC) injection 1 mg, 1 mg, Subcutaneous, Q15 Min PRN, Jim Renae APRN  •  insulin lispro (humaLOG) injection 0-7 Units, 0-7 Units, Subcutaneous, Q6H, Jim Renae, BAYLEE, 2 Units at 10/15/19 0559  •  meropenem (MERREM) 500 mg in sodium chloride 0.9  % 100 mL IVPB, 500 mg, Intravenous, Q24H, Jim Renae APRN  •  ondansetron (ZOFRAN) injection 4 mg, 4 mg, Intravenous, Q6H PRN, Jim Renae APRN, 4 mg at 10/15/19 0037  •  Pharmacy to Dose meropenem (MERREM), , Does not apply, Continuous PRN, Jim Renae APRN  •  Pharmacy to dose vancomycin, , Does not apply, Continuous PRN, Jim Renae APRN  •  phenylephrine (AREN-SYNEPHRINE) 50 mg/250 mL (0.2 mg/mL) in 0.9% NS  infusion, 0.5-3 mcg/kg/min, Intravenous, Titrated, Jim Renae APRN, Last Rate: 29 mL/hr at 10/15/19 0559, 2 mcg/kg/min at 10/15/19 0559  •  sodium bicarbonate 8.4 % 150 mEq in dextrose (D5W) 5 % 1,000 mL infusion (greater than 75 mEq), 150 mEq, Intravenous, Continuous, Jim Renae APRN, Last Rate: 75 mL/hr at 10/15/19 0030, 150 mEq at 10/15/19 0030  •  sodium chloride 0.9 % flush 10 mL, 10 mL, Intravenous, PRN, Jossue Donovan MD  •  sodium chloride 0.9 % flush 10 mL, 10 mL, Intravenous, Q12H, Jim Renae APRN, 10 mL at 10/15/19 0803  •  sodium chloride 0.9 % flush 10 mL, 10 mL, Intravenous, PRN, Jim Renae APRN  •  sodium chloride 0.9 % infusion, 75 mL/hr, Intravenous, Continuous, Jossue Donovan MD, Last Rate: 75 mL/hr at 10/14/19 2045, 75 mL/hr at 10/14/19 2045  •  vancomycin 750 mg in sodium chloride 0.9 % 100 mL IVPB, 15 mg/kg, Intravenous, PRN, Jim Renae APRN    Allergies:  Allergies   Allergen Reactions   • Cephalexin Swelling   • Sulfadiazine Rash   • Trimethoprim Hives   • Trospium Hives     Social History:   Social History     Tobacco Use   • Smoking status: Never Smoker   • Smokeless tobacco: Never Used   Substance Use Topics   • Alcohol use: No     Frequency: Never      Family History:  Family History   Problem Relation Age of Onset   • Heart disease Mother    • Diabetes Mother    • Heart disease Sister    • Diabetes Sister         Review of Systems  Unobtainable    Objective:  Vitals:   /55   Pulse 111   Temp 98 °F (36.7 °C) (Oral)    "Resp (!) 29   Ht 160 cm (63\")   Wt 57.7 kg (127 lb 3.3 oz)   SpO2 100%   BMI 22.53 kg/m²    I/O:     Intake/Output Summary (Last 24 hours) at 10/15/2019 0930  Last data filed at 10/15/2019 0600  Gross per 24 hour   Intake 3743 ml   Output 300 ml   Net 3443 ml       Exam:  General Appearance: Awake on Sulaiman-Synephrine and amiodarone and oxygen  Head:  Normocephalic, without obvious abnormality, atraumatic  Eyes:  PERRL, conjunctiva/corneas clear     Neck:  Supple,  no adenopathy;      Lungs:  Decreased BS occasion ronchi  Heart:  Regular rate and rhythm, S1 and S2 normal  Abdomen: Colostomy in place  Extremities: trace edema  Pulses: 2+ and symmetric all extremities  Skin:  No rashes or lesions  Data Review:  All labs (24hrs):   Recent Results (from the past 24 hour(s))   Comprehensive Metabolic Panel    Collection Time: 10/14/19  4:19 PM   Result Value Ref Range    Glucose 152 (H) 65 - 99 mg/dL    BUN 71 (H) 8 - 20 mg/dL    Creatinine 4.00 (H) 0.40 - 1.00 mg/dL    Sodium 142 136 - 144 mmol/L    Potassium 5.2 (H) 3.6 - 5.1 mmol/L    Chloride 103 101 - 111 mmol/L    CO2 12.0 (L) 22.0 - 32.0 mmol/L    Calcium 8.0 (L) 8.9 - 10.3 mg/dL    Total Protein 6.4 6.1 - 7.9 g/dL    Albumin 3.50 3.50 - 4.80 g/dL    ALT (SGPT) <5 (L) 14 - 54 U/L    AST (SGOT) 51 (H) 15 - 41 U/L    Alkaline Phosphatase 97 (H) 32 - 91 U/L    Total Bilirubin 2.1 (H) 0.3 - 1.2 mg/dL    eGFR Non African Amer 10 (L) >60 mL/min/1.73    eGFR  African Amer      Globulin 2.9 2.5 - 3.8 gm/dL    A/G Ratio 1.2 1.0 - 1.7 g/dL    BUN/Creatinine Ratio 17.8 5.4 - 26.2    Anion Gap 32.2 (C) 5.0 - 15.0 mmol/L   BNP    Collection Time: 10/14/19  4:19 PM   Result Value Ref Range    BNP 1,784.0 (H) <=100.0 pg/mL   Protime-INR    Collection Time: 10/14/19  4:19 PM   Result Value Ref Range    Protime 23.1 (H) 9.6 - 11.7 Seconds    INR 2.44 (C) 0.90 - 1.10   aPTT    Collection Time: 10/14/19  4:19 PM   Result Value Ref Range    PTT 26.2 24.0 - 31.0 seconds   Magnesium "    Collection Time: 10/14/19  4:19 PM   Result Value Ref Range    Magnesium 3.4 (H) 1.8 - 2.5 mg/dL   Phosphorus    Collection Time: 10/14/19  4:19 PM   Result Value Ref Range    Phosphorus 6.5 (H) 2.4 - 4.7 mg/dL   C-reactive Protein    Collection Time: 10/14/19  4:19 PM   Result Value Ref Range    C-Reactive Protein 11.81 (H) 0.00 - 0.70 mg/dL   Light Blue Top    Collection Time: 10/14/19  4:19 PM   Result Value Ref Range    Extra Tube hold for add-on    Green Top (Gel)    Collection Time: 10/14/19  4:19 PM   Result Value Ref Range    Extra Tube Hold for add-ons.    Troponin    Collection Time: 10/14/19  4:20 PM   Result Value Ref Range    Troponin I 0.340 (C) 0.000 - 0.030 ng/mL   CBC Auto Differential    Collection Time: 10/14/19  4:20 PM   Result Value Ref Range    WBC 18.80 (H) 3.40 - 10.80 10*3/mm3    RBC 4.29 3.77 - 5.28 10*6/mm3    Hemoglobin 13.1 12.0 - 15.9 g/dL    Hematocrit 44.9 34.0 - 46.6 %    .8 (H) 79.0 - 97.0 fL    MCH 30.7 26.6 - 33.0 pg    MCHC 29.3 (L) 31.5 - 35.7 g/dL    RDW 19.3 (H) 12.3 - 15.4 %    RDW-SD 71.8 (H) 37.0 - 54.0 fl    MPV 9.4 6.0 - 12.0 fL    Platelets 210 140 - 450 10*3/mm3    Neutrophil % 88.0 (H) 42.7 - 76.0 %    Lymphocyte % 3.7 (L) 19.6 - 45.3 %    Monocyte % 8.1 5.0 - 12.0 %    Eosinophil % 0.0 (L) 0.3 - 6.2 %    Basophil % 0.2 0.0 - 1.5 %    Neutrophils, Absolute 16.50 (H) 1.70 - 7.00 10*3/mm3    Lymphocytes, Absolute 0.70 0.70 - 3.10 10*3/mm3    Monocytes, Absolute 1.50 (H) 0.10 - 0.90 10*3/mm3    Eosinophils, Absolute 0.00 0.00 - 0.40 10*3/mm3    Basophils, Absolute 0.00 0.00 - 0.20 10*3/mm3    nRBC 0.5 (H) 0.0 - 0.2 /100 WBC   Blood Gas, Arterial    Collection Time: 10/14/19  4:46 PM   Result Value Ref Range    Site Left Brachial     John's Test N/A     pH, Arterial 7.185 (C) 7.350 - 7.450 pH units    pCO2, Arterial 31.9 (L) 35.0 - 48.0 mm Hg    pO2, Arterial 19.5 (C) 83.0 - 108.0 mm Hg    HCO3, Arterial 12.0 (L) 21.0 - 28.0 mmol/L    Base Excess, Arterial  -15.1 (L) 0.0 - 3.0 mmol/L    O2 Saturation, Arterial 21.8 (L) 94.0 - 98.0 %    CO2 Content 13.0 (L) 22 - 29 mmol/L    Barometric Pressure for Blood Gas      Modality Cannula     FIO2 44 %    Hemodilution No    Calcium, Ionized    Collection Time: 10/14/19  4:47 PM   Result Value Ref Range    Ionized Calcium 1.02 (L) 1.20 - 1.30 mmol/L   Gold Top - SST    Collection Time: 10/14/19  4:47 PM   Result Value Ref Range    Extra Tube Hold for add-ons.    Comprehensive Metabolic Panel    Collection Time: 10/14/19  4:47 PM   Result Value Ref Range    Glucose 142 (H) 65 - 99 mg/dL    BUN 71 (H) 8 - 20 mg/dL    Creatinine 3.80 (H) 0.40 - 1.00 mg/dL    Sodium 144 136 - 144 mmol/L    Potassium 5.2 (H) 3.6 - 5.1 mmol/L    Chloride 102 101 - 111 mmol/L    CO2 14.0 (L) 22.0 - 32.0 mmol/L    Calcium 8.2 (L) 8.9 - 10.3 mg/dL    Total Protein 6.2 6.1 - 7.9 g/dL    Albumin 3.50 3.50 - 4.80 g/dL    ALT (SGPT) <5 (L) 14 - 54 U/L    AST (SGOT) 51 (H) 15 - 41 U/L    Alkaline Phosphatase 97 (H) 32 - 91 U/L    Total Bilirubin 2.3 (H) 0.3 - 1.2 mg/dL    eGFR Non African Amer 11 (L) >60 mL/min/1.73    eGFR  African Amer      Globulin 2.7 2.5 - 3.8 gm/dL    A/G Ratio 1.3 1.0 - 1.7 g/dL    BUN/Creatinine Ratio 18.7 5.4 - 26.2    Anion Gap 33.2 (C) 5.0 - 15.0 mmol/L   POC Lactate    Collection Time: 10/14/19  4:51 PM   Result Value Ref Range    Lactate 9.1 (C) 0.5 - 2.0 mmol/L   POC Lactate    Collection Time: 10/14/19  4:53 PM   Result Value Ref Range    Lactate 10.0 (C) 0.5 - 2.0 mmol/L   POC Lactate    Collection Time: 10/14/19  4:54 PM   Result Value Ref Range    Lactate 9.2 (C) 0.5 - 2.0 mmol/L   POC Lactate    Collection Time: 10/14/19  7:10 PM   Result Value Ref Range    Lactate 9.6 (C) 0.5 - 2.0 mmol/L   Urinalysis With Culture If Indicated - Urine, Catheter    Collection Time: 10/14/19  7:29 PM   Result Value Ref Range    Color, UA Dark Yellow (A) Yellow, Straw    Appearance, UA Turbid (A) Clear    pH, UA 5.5 5.0 - 8.0    Specific  Gravity, UA 1.016 1.005 - 1.030    Glucose, UA Negative Negative    Ketones, UA Trace (A) Negative    Bilirubin, UA Small (1+) (A) Negative    Blood, UA Moderate (2+) (A) Negative    Protein, UA 30 mg/dL (1+) (A) Negative    Leuk Esterase, UA Large (3+) (A) Negative    Nitrite, UA Negative Negative    Urobilinogen, UA 0.2 E.U./dL 0.2 - 1.0 E.U./dL   Urinalysis, Microscopic Only - Urine, Catheter    Collection Time: 10/14/19  7:29 PM   Result Value Ref Range    RBC, UA 3-5 (A) None Seen /HPF    WBC, UA Too Numerous to Count (A) None Seen /HPF    Bacteria, UA 3+ (A) None Seen /HPF    Squamous Epithelial Cells, UA 3-6 (A) None Seen, 0-2 /HPF    Hyaline Casts, UA 3-6 None Seen /LPF    Granular Casts, UA 0-2 None Seen /LPF    Methodology Manual Light Microscopy    S. Pneumo Ag Urine or CSF - Urine, Urine, Catheter    Collection Time: 10/14/19  7:29 PM   Result Value Ref Range    Strep Pneumo Ag Negative Negative   Legionella Antigen, Urine - Urine, Urine, Catheter    Collection Time: 10/14/19  7:29 PM   Result Value Ref Range    LEGIONELLA ANTIGEN, URINE Negative Negative   Respiratory Panel, PCR - Swab, Nasopharynx    Collection Time: 10/14/19  7:51 PM   Result Value Ref Range    ADENOVIRUS, PCR Not Detected Not Detected    Coronavirus 229E Not Detected Not Detected    Coronavirus HKU1 Not Detected Not Detected    Coronavirus NL63 Not Detected Not Detected    Coronavirus OC43 Not Detected Not Detected    Human Metapneumovirus Not Detected Not Detected    Human Rhinovirus/Enterovirus Not Detected Not Detected    Influenza B PCR Not Detected Not Detected    Parainfluenza Virus 1 Not Detected Not Detected    Parainfluenza Virus 2 Not Detected Not Detected    Parainfluenza Virus 3 Not Detected Not Detected    Parainfluenza Virus 4 Not Detected Not Detected    Bordetella pertussis pcr Not Detected Not Detected    Influenza A H1 2009 PCR Not Detected Not Detected    Chlamydophila pneumoniae PCR Not Detected Not Detected     Mycoplasma pneumo by PCR Not Detected Not Detected    Influenza A PCR Not Detected Not Detected    Influenza A H3 Not Detected Not Detected    Influenza A H1 Not Detected Not Detected    RSV, PCR Not Detected Not Detected   POC Glucose Once    Collection Time: 10/14/19 11:31 PM   Result Value Ref Range    Glucose 120 (H) 70 - 105 mg/dL   Troponin    Collection Time: 10/14/19 11:33 PM   Result Value Ref Range    Troponin I 0.340 (C) 0.000 - 0.030 ng/mL   Lactic Acid, Plasma    Collection Time: 10/14/19 11:33 PM   Result Value Ref Range    Lactate 13.2 (C) 0.5 - 2.2 mmol/L   Blood Gas, Arterial    Collection Time: 10/14/19 11:58 PM   Result Value Ref Range    Site Left Brachial     John's Test N/A     pH, Arterial 7.151 (C) 7.350 - 7.450 pH units    pCO2, Arterial 22.2 (L) 35.0 - 48.0 mm Hg    pO2, Arterial 91.2 83.0 - 108.0 mm Hg    HCO3, Arterial 7.7 (L) 21.0 - 28.0 mmol/L    Base Excess, Arterial -19.3 (L) 0.0 - 3.0 mmol/L    O2 Saturation, Arterial 94.4 94.0 - 98.0 %    CO2 Content 8.4 (L) 22 - 29 mmol/L    Barometric Pressure for Blood Gas      Modality Vapotherm     FIO2 60 %    Hemodilution No    Basic Metabolic Panel    Collection Time: 10/15/19 12:29 AM   Result Value Ref Range    Glucose 101 (H) 65 - 99 mg/dL    BUN 67 (H) 8 - 20 mg/dL    Creatinine 3.70 (H) 0.40 - 1.00 mg/dL    Sodium 148 (H) 136 - 144 mmol/L    Potassium 4.3 3.6 - 5.1 mmol/L    Chloride 109 101 - 111 mmol/L    CO2 15.0 (L) 22.0 - 32.0 mmol/L    Calcium 7.5 (L) 8.9 - 10.3 mg/dL    eGFR  African Amer      eGFR Non African Amer 11 (L) >60 mL/min/1.73    BUN/Creatinine Ratio 18.1 5.4 - 26.2    Anion Gap 28.3 (C) 5.0 - 15.0 mmol/L   CBC (No Diff)    Collection Time: 10/15/19 12:34 AM   Result Value Ref Range    WBC 21.70 (H) 3.40 - 10.80 10*3/mm3    RBC 3.55 (L) 3.77 - 5.28 10*6/mm3    Hemoglobin 11.0 (L) 12.0 - 15.9 g/dL    Hematocrit 35.1 34.0 - 46.6 %    MCV 98.9 (H) 79.0 - 97.0 fL    MCH 30.9 26.6 - 33.0 pg    MCHC 31.2 (L) 31.5 - 35.7  g/dL    RDW 17.5 (H) 12.3 - 15.4 %    RDW-SD 60.8 (H) 37.0 - 54.0 fl    MPV 8.7 6.0 - 12.0 fL    Platelets 187 140 - 450 10*3/mm3   Protime-INR    Collection Time: 10/15/19  4:23 AM   Result Value Ref Range    Protime 29.6 (H) 9.6 - 11.7 Seconds    INR 3.14 (C) 0.90 - 1.10   Lactic Acid, Plasma    Collection Time: 10/15/19  4:24 AM   Result Value Ref Range    Lactate 8.6 (C) 0.5 - 2.2 mmol/L   Troponin    Collection Time: 10/15/19  4:24 AM   Result Value Ref Range    Troponin I 0.430 (C) 0.000 - 0.030 ng/mL   Basic Metabolic Panel    Collection Time: 10/15/19  4:24 AM   Result Value Ref Range    Glucose 161 (H) 65 - 99 mg/dL    BUN 69 (H) 8 - 20 mg/dL    Creatinine 3.50 (H) 0.40 - 1.00 mg/dL    Sodium 147 (H) 136 - 144 mmol/L    Potassium 4.6 3.6 - 5.1 mmol/L    Chloride 107 101 - 111 mmol/L    CO2 20.0 (L) 22.0 - 32.0 mmol/L    Calcium 7.3 (L) 8.9 - 10.3 mg/dL    eGFR  African Amer      eGFR Non African Amer 12 (L) >60 mL/min/1.73    BUN/Creatinine Ratio 19.7 5.4 - 26.2    Anion Gap 24.6 (H) 5.0 - 15.0 mmol/L   Magnesium    Collection Time: 10/15/19  4:24 AM   Result Value Ref Range    Magnesium 2.8 (H) 1.8 - 2.5 mg/dL   Phosphorus    Collection Time: 10/15/19  4:24 AM   Result Value Ref Range    Phosphorus 4.3 2.4 - 4.7 mg/dL   CBC Auto Differential    Collection Time: 10/15/19  4:24 AM   Result Value Ref Range    WBC 24.90 (H) 3.40 - 10.80 10*3/mm3    RBC 3.72 (L) 3.77 - 5.28 10*6/mm3    Hemoglobin 11.2 (L) 12.0 - 15.9 g/dL    Hematocrit 36.4 34.0 - 46.6 %    MCV 97.9 (H) 79.0 - 97.0 fL    MCH 30.0 26.6 - 33.0 pg    MCHC 30.6 (L) 31.5 - 35.7 g/dL    RDW 17.1 (H) 12.3 - 15.4 %    RDW-SD 58.2 (H) 37.0 - 54.0 fl    MPV 9.1 6.0 - 12.0 fL    Platelets 185 140 - 450 10*3/mm3   Type & Screen    Collection Time: 10/15/19  4:24 AM   Result Value Ref Range    ABO Type O     RH type Positive     Antibody Screen Negative     T&S Expiration Date 10/18/2019 11:59:59 PM    Scan Slide    Collection Time: 10/15/19  4:24 AM    Result Value Ref Range    Scan Slide     Manual Differential    Collection Time: 10/15/19  4:24 AM   Result Value Ref Range    Neutrophil % 84.0 (H) 42.7 - 76.0 %    Lymphocyte % 4.0 (L) 19.6 - 45.3 %    Monocyte % 6.0 5.0 - 12.0 %    Bands %  6.0 (H) 0.0 - 5.0 %    Neutrophils Absolute 22.41 (H) 1.70 - 7.00 10*3/mm3    Lymphocytes Absolute 1.00 0.70 - 3.10 10*3/mm3    Monocytes Absolute 1.49 (H) 0.10 - 0.90 10*3/mm3    nRBC 2.0 (H) 0.0 - 0.2 /100 WBC    Dhruv Cells Mod/2+ None Seen    Polychromasia Slight/1+ None Seen    Toxic Granulation Mod/2+ None Seen    Vacuolated Neutrophils Slight/1+ None Seen    Platelet Morphology Normal Normal   Blood Gas, Arterial    Collection Time: 10/15/19  4:26 AM   Result Value Ref Range    Site Left Brachial     John's Test N/A     pH, Arterial 7.383 7.350 - 7.450 pH units    pCO2, Arterial 32.0 (L) 35.0 - 48.0 mm Hg    pO2, Arterial 93.3 83.0 - 108.0 mm Hg    HCO3, Arterial 19.1 (L) 21.0 - 28.0 mmol/L    Base Excess, Arterial -5.1 (L) 0.0 - 3.0 mmol/L    O2 Saturation, Arterial 97.2 94.0 - 98.0 %    CO2 Content 20.1 (L) 22 - 29 mmol/L    Barometric Pressure for Blood Gas      Modality Vapotherm     FIO2 60 %    Hemodilution No    POC Glucose Once    Collection Time: 10/15/19  5:44 AM   Result Value Ref Range    Glucose 161 (H) 70 - 105 mg/dL   Adult Transthoracic Echo Complete W/ Cont if Necessary Per Protocol    Collection Time: 10/15/19  7:54 AM   Result Value Ref Range    BSA 1.6 m^2     CV ECHO FEDERICO - RVDD 2.5 cm    IVSd 0.85 cm    LVIDd 3.6 cm    LVIDs 2.4 cm    LVPWd 0.9 cm    IVS/LVPW 0.94     FS 32.6 %    EDV(Teich) 54.0 ml    ESV(Teich) 20.6 ml    EF(Teich) 61.9 %    EDV(cubed) 46.2 ml    ESV(cubed) 14.2 ml    EF(cubed) 69.3 %    LV mass(C)d 88.9 grams    LV mass(C)dI 55.8 grams/m^2    SV(Teich) 33.4 ml    SI(Teich) 21.0 ml/m^2    SV(cubed) 32.0 ml    SI(cubed) 20.1 ml/m^2    Ao root diam 2.6 cm    Ao root area 5.2 cm^2    ACS 1.6 cm    LVOT diam 1.8 cm    LVOT area  2.4 cm^2    RVOT diam 2.4 cm    RVOT area 4.4 cm^2    EDV(MOD-sp4) 31.5 ml    ESV(MOD-sp4) 14.5 ml    EF(MOD-sp4) 54.0 %    SV(MOD-sp4) 17.0 ml    SI(MOD-sp4) 10.7 ml/m^2    Ao root area (BSA corrected) 1.6     LV [place] Vol (BSA corrected) 19.8 ml/m^2    LV Sys Vol (BSA corrected) 9.1 ml/m^2    MV E max jermaine 81.7 cm/sec    MV V2 max 83.7 cm/sec    MV max PG 2.8 mmHg    MV V2 mean 44.4 cm/sec    MV mean PG 1.1 mmHg    MV V2 VTI 13.8 cm    MVA(VTI) 1.8 cm^2    MV dec time 0.2 sec    LV V1 max PG 1.2 mmHg    LV V1 mean PG 0.59 mmHg    LV V1 max 54.8 cm/sec    LV V1 mean 35.8 cm/sec    LV V1 VTI 10.4 cm    MR max jermaine 255.2 cm/sec    MR max PG 26.1 mmHg    SV(LVOT) 25.4 ml    SV(RVOT) 34.2 ml    SI(LVOT) 15.9 ml/m^2    PA V2 max 61.0 cm/sec    PA max PG 1.5 mmHg    PA max PG (full) 0.43 mmHg     CV ECHO FEDERICO - PVA(V,A) 3.7 cm^2     CV ECHO FEDERICO - PVA(V,D) 3.7 cm^2    RV V1 max PG 1.1 mmHg    RV V1 mean PG 0.44 mmHg    RV V1 max 51.4 cm/sec    RV V1 mean 30.7 cm/sec    RV V1 VTI 7.8 cm    TR max jermaine 291.7 cm/sec    RVSP(TR) 37.0 mmHg    RAP systole 3.0 mmHg    Qp/Qs 1.3      CV ECHO FEDERICO - BZI_BMI 22.5 kilograms/m^2     CV ECHO FEDERICO - BSA(LennonCOCK) 1.6 m^2     CV ECHO FEDERICO - BZI_METRIC_WEIGHT 57.6 kg     CV ECHO FEDERICO - BZI_METRIC_HEIGHT 160.0 cm    EF(MOD-bp) 54.0 %    LA dimension(2D) 4.0 cm   Lactic Acid, Plasma    Collection Time: 10/15/19  8:03 AM   Result Value Ref Range    Lactate 5.2 (C) 0.5 - 2.2 mmol/L   Calcium, Ionized    Collection Time: 10/15/19  8:14 AM   Result Value Ref Range    Ionized Calcium 0.96 (L) 1.20 - 1.30 mmol/L       Current Facility-Administered Medications:   •  [COMPLETED] amiodarone in dextrose 5% (NEXTERONE) loading dose 150mg/100mL, 150 mg, Intravenous, Once, Last Rate: 600 mL/hr at 10/14/19 2125, 150 mg at 10/14/19 2125 **FOLLOWED BY** [] AMIODARONE HCL IN DEXTROSE 450-5 MG/250ML-% IV SOLN, 1 mg/min, Intravenous, Continuous, Last Rate: 33.3 mL/hr at 10/14/19 2147, 1 mg/min  at 10/14/19 2147 **FOLLOWED BY** AMIODARONE HCL IN DEXTROSE 450-5 MG/250ML-% IV SOLN, 0.5 mg/min, Intravenous, Continuous, Jim Renae APRN, Last Rate: 16.67 mL/hr at 10/15/19 0559, 0.5 mg/min at 10/15/19 0559  •  calcium gluconate 2 g/100 mL NS IVPB, 2 g, Intravenous, Once, Haydee Meier APRN  •  dextrose (D50W) 25 g/ 50mL Intravenous Solution 25 g, 25 g, Intravenous, Q15 Min PRN, Jim Renae APRN  •  dextrose (GLUTOSE) oral gel 15 g, 15 g, Oral, Q15 Min PRN, Jim Renae APRN  •  famotidine (PEPCID) tablet 20 mg, 20 mg, Oral, Q AM, Jim Renae APRN, 20 mg at 10/15/19 0559  •  glucagon (human recombinant) (GLUCAGEN DIAGNOSTIC) injection 1 mg, 1 mg, Subcutaneous, Q15 Min PRN, Jim Renae APRN  •  insulin lispro (humaLOG) injection 0-7 Units, 0-7 Units, Subcutaneous, Q6H, Jim Renae APRN, 2 Units at 10/15/19 0559  •  meropenem (MERREM) 500 mg in sodium chloride 0.9 % 100 mL IVPB, 500 mg, Intravenous, Q24H, Jim Renae APRN  •  ondansetron (ZOFRAN) injection 4 mg, 4 mg, Intravenous, Q6H PRN, Jim Renae APRN, 4 mg at 10/15/19 0037  •  Pharmacy to Dose meropenem (MERREM), , Does not apply, Continuous PRN, Jim Renae APRN  •  Pharmacy to dose vancomycin, , Does not apply, Continuous PRN, Jim Renae APRN  •  phenylephrine (AREN-SYNEPHRINE) 50 mg/250 mL (0.2 mg/mL) in 0.9% NS  infusion, 0.5-3 mcg/kg/min, Intravenous, Titrated, Jim Renae APRN, Last Rate: 29 mL/hr at 10/15/19 0559, 2 mcg/kg/min at 10/15/19 0559  •  sodium bicarbonate 8.4 % 150 mEq in dextrose (D5W) 5 % 1,000 mL infusion (greater than 75 mEq), 150 mEq, Intravenous, Continuous, Jim Renae APRN, Last Rate: 75 mL/hr at 10/15/19 0030, 150 mEq at 10/15/19 0030  •  sodium chloride 0.9 % flush 10 mL, 10 mL, Intravenous, PRN, Jossue Donovan MD  •  sodium chloride 0.9 % flush 10 mL, 10 mL, Intravenous, Q12H, Jim Renae APRN, 10 mL at 10/15/19 0803  •  sodium chloride 0.9 % flush 10 mL, 10  mL, Intravenous, PRN, Jim Renae APRN  •  sodium chloride 0.9 % infusion, 75 mL/hr, Intravenous, Continuous, Jossue Donovan MD, Last Rate: 75 mL/hr at 10/14/19 2045, 75 mL/hr at 10/14/19 2045  •  vancomycin 750 mg in sodium chloride 0.9 % 100 mL IVPB, 15 mg/kg, Intravenous, PRN, Jim Renae APRN    Assessment:  Acute kidney injury on chronic kidney disease stage III baseline creatinine 1.4   -Due to low blood pressure and sepsis   -With metabolic acidosis   -Creatinine was rising this morning is 3.5 from 3.7   -Normal urine output   -Continue bicarb drip   -Recheck labs this afternoon  Hypocalcemia replace  Acute hypoxic respiratory failure  Septic shock  Urosepsis  Leukocytosis  Pleural effusions  Status post hyperkalemia  Atrial fibrillation with rapid ventricular response  History of hypertension  History of lower extremity DVT and PE  New onset congestive heart failure  Abdominal pain with possible ileus  Non-ST elevation MI    Recommendations:  Bicarb drip  Replace calcium  Check CPK  Recheck labs this afternoon  Her urine output is minimal but her creatinine is now started to come down  There is no immediate need for dialysis  Patient is critically ill  Discussed with her daughter-in-law  Discussed with her nurse  Critical care time spent with the patient and family is 32 minutes      Margarita Gallardo MD  10/15/2019  9:30 AM        Electronically signed by Margarita Gallardo MD at 10/15/19 0957       Physical Therapy Notes (most recent note)     No notes of this type exist for this encounter.        Occupational Therapy Notes (most recent note)     No notes of this type exist for this encounter.

## 2019-10-15 NOTE — PROGRESS NOTES
Continued Stay Note  LULY Alvarado     Patient Name: Dinora Vásquez  MRN: [id number]  Today's Date: 10/15/2019    Admit Date: 10/14/2019    Discharge Plan     Row Name 10/15/19 1318       Plan    Plan  Return to [facility] pending pre-cert.  PT/OT evals pending.  No PASRR needed.      Patient/Family in Agreement with Plan  yes    Plan Comments  SW spoke with pt and she is agreeable to returning to [facility].  Pt is a long term care resident and was private pay.  However, pt may be able to return skilled.  Facility will attpempt to pre-cert.       Meme Wren, MADDYW, LSW    Phone:  [phone]

## 2019-10-16 NOTE — PLAN OF CARE
Problem: Fall Risk (Adult)  Goal: Identify Related Risk Factors and Signs and Symptoms  Outcome: Ongoing (interventions implemented as appropriate)   10/16/19 0441   Fall Risk (Adult)   Related Risk Factors (Fall Risk) age-related changes;homeostatic imbalance;slippery/uneven surfaces;environment unfamiliar;sleep pattern alteration   Signs and Symptoms (Fall Risk) presence of risk factors     Goal: Absence of Fall  Outcome: Ongoing (interventions implemented as appropriate)   10/16/19 0441   Fall Risk (Adult)   Absence of Fall achieves outcome       Problem: Patient Care Overview  Goal: Plan of Care Review  Outcome: Ongoing (interventions implemented as appropriate)   10/16/19 0441   Coping/Psychosocial   Plan of Care Reviewed With patient   Plan of Care Review   Progress improving   OTHER   Outcome Summary Pt stable over night. Have been able to wean down Sulaiman, remains on amio gtt. O2 weaned down from precision flow to high flow nasal cannula. Lactic down to normal range. Making more urine. Will continue to monitor.       Problem: Skin Injury Risk (Adult)  Goal: Identify Related Risk Factors and Signs and Symptoms  Outcome: Ongoing (interventions implemented as appropriate)   10/16/19 0441   Skin Injury Risk (Adult)   Related Risk Factors (Skin Injury Risk) advanced age;critical care admission;hospitalization prolonged;infection;mobility impaired;medication;medical devices;mechanical forces;nutritional deficiencies;tissue perfusion altered     Goal: Skin Health and Integrity  Outcome: Ongoing (interventions implemented as appropriate)   10/16/19 0441   Skin Injury Risk (Adult)   Skin Health and Integrity making progress toward outcome       Problem: Sepsis/Septic Shock (Adult)  Goal: Signs and Symptoms of Listed Potential Problems Will be Absent, Minimized or Managed (Sepsis/Septic Shock)  Outcome: Ongoing (interventions implemented as appropriate)   10/16/19 0441   Goal/Outcome Evaluation   Problems Assessed  (Sepsis) all   Problems Present (Sepsis) hypoxia/hypoxemia;situational response

## 2019-10-16 NOTE — PLAN OF CARE
Problem: Skin Injury Risk (Adult)  Intervention: Prevent/Manage Excess Moisture   10/16/19 1600   Skin Interventions   Skin Protection sacral silicone foam dressing in place;incontinence pads utilized;electrode sites changed   Hygiene Care   Perineal Care absorbent pad changed;perineum cleansed     Intervention: Maintain Head of Bed Elevation Less Than 30 Degrees as Tolerated   10/16/19 1600   Positioning   Head of Bed (HOB) HOB at 30 degrees;HOB at 30-45 degrees     Intervention: Prevent/Minimize Shear/Friction Injuries   10/16/19 1600   Positioning   Positioning/Transfer Devices pillows;in use;wedge   Skin Interventions   Pressure Reduction Devices specialty bed utilized     Intervention: Prevent or Minimize Pressure   10/16/19 1600   Positioning   Body Position turned;weight shift assistance provided;neutral body alignment;dangle, side of bed   Skin Interventions   Pressure Reduction Techniques heels elevated off bed;weight shift assistance provided         Problem: Sepsis/Septic Shock (Adult)  Goal: Signs and Symptoms of Listed Potential Problems Will be Absent, Minimized or Managed (Sepsis/Septic Shock)   10/16/19 1600   Goal/Outcome Evaluation   Problems Assessed (Sepsis) all   Problems Present (Sepsis) other (see comments)  (no longer on vasopressor and O2 requirements trending down)

## 2019-10-16 NOTE — PROGRESS NOTES
Pulmonary/ Critical Care progress Note        Patient Name:  Dinora Vásquez    :  1926    Medical Record:  [id number]    Primary Care Physician     Florentino Kimbrough MD [facility]  Dinora Vásquez is a 89 y.o. female who was presented to the ER after being seen earlier by Nephrology and found to be hypotensive and tachycardic; in addition she complained of dyspnea, abdominal pain and not feeling well.  She had a bolus of IVFs started and transferred to the ER.  Workup in the ER revealed afib with RVR, UTI, septic shock, CHF, STEMI, acute hypoxic respiratory failure and LEYLA.  She had IVFs per sepsis protocol given and started on Meropenem.  She was given a bolus of amio and Cardizem without improvement in her heart rate and subsequently developed hypotension.  She had a PICC ordered by the ER and was started on Levophed.   The patient is currently difficult to get a history from as she is obtunded.  Her sats were in the 70's and she was started on Precision Flow with improvement.      10/15/19: Short of breath with minimal activity.  Remains on Precision flow supplemental oxygen.  No chest pains.  No nausea or vomiting  10/16/19:  Improved today on 8L high flow nasal cannula.  HR remains 120s.  No abdominal pain.  No CP    Review of Systems    As above        Home medicationS  Prior to Admission medications    Medication Sig Start Date End Date Taking? Authorizing Provider   cholecalciferol (VITAMIN D3) 1000 units tablet Daily. 17   Aravind Silva MD   Cranberry 600 MG tablet Take 600 mg by mouth 3 (Three) Times a Day.    ProviderAravind MD   ELIQUIS 2.5 MG tablet tablet Take 2.5 mg by mouth 2 (Two) Times a Day. 19   Aravind Silva MD   fluorometholone (FML) 0.1 % ophthalmic suspension Administer 1 Drop/kg to both eyes Daily. 19   Aravind Silva MD   levothyroxine (SYNTHROID, LEVOTHROID) 75 MCG tablet Every Morning Before Breakfast. 19   Shira  MD Aravind   loratadine (CLARITIN) 10 MG tablet Take 10 mg by mouth Daily.    Aravind Silva MD   Magnesium 400 MG tablet MAGNESIUM 400 MG TABS 11/2/18   Aravind Silva MD   metoprolol tartrate (LOPRESSOR) 50 MG tablet Take 50 mg by mouth 2 (Two) Times a Day. 6/11/19   Aravind Silva MD   mirtazapine (REMERON) 7.5 MG tablet 7.5 mg Daily. 6/18/19   Aravind Silva MD   polyvinyl alcohol (ARTIFICIAL TEARS) 1.4 % ophthalmic solution Every 12 (Twelve) Hours. 11/2/18   Aravind Silva MD   Prenatal w/o A Vit-Fe Fum-FA (BP MULTINATAL PLUS) 30-1 MG tablet Daily. 12/19/17   Aravind Silva MD   saccharomyces boulardii (FLORASTOR) 250 MG capsule FLORASTOR 250 MG CAPS 6/11/18   Aravind Silva MD   sertraline (ZOLOFT) 25 MG tablet Take 25 mg by mouth Daily. 6/11/19   Aravind Silva MD       Medical History    Past Medical History:   Diagnosis Date   • Ankle wound, right, initial encounter    • Aortic stenosis    • Atrial fibrillation (CMS/HCC)    • Coronary artery disease    • DVT (deep venous thrombosis) (CMS/HCC)    • Hypothyroidism    • Left bundle branch block    • Pulmonary embolus (CMS/HCC)    • Pulmonary hypertension (CMS/HCC)    • Sinus bradycardia         Surgical History    Past Surgical History:   Procedure Laterality Date   • COLOSTOMY     • ORIF ANKLE FRACTURE Right 2013    Right Ankle ORIF    • PACEMAKER IMPLANTATION  11/29/2017    Dual Chamber [employer]   • TOTAL ABDOMINAL HYSTERECTOMY          Family History    Family History   Problem Relation Age of Onset   • Heart disease Mother    • Diabetes Mother    • Heart disease Sister    • Diabetes Sister        Social History    Social History     Tobacco Use   • Smoking status: Never Smoker   • Smokeless tobacco: Never Used   Substance Use Topics   • Alcohol use: No     Frequency: Never        Allergies    Allergies   Allergen Reactions   • Cephalexin Swelling   • Sulfadiazine Rash   • Trimethoprim  Hives   • Trospium Hives       Medications    Scheduled Meds:    dexamethasone 1 drop Both Eyes Q8H   famotidine 20 mg Oral Q AM   insulin lispro 0-7 Units Subcutaneous 4x Daily With Meals & Nightly   levothyroxine 75 mcg Oral QAM AC   meropenem 500 mg Intravenous Q24H   mirtazapine 7.5 mg Oral Nightly   polyethyl glycol-propyl glycol 1 drop Both Eyes BID   potassium chloride 20 mEq Oral Daily   sertraline 25 mg Oral Daily   sodium chloride 10 mL Intravenous Q12H     Continuous Infusions:    Pharmacy to Dose meropenem (MERREM)     Pharmacy to dose vancomycin     phenylephrine 0.5-3 mcg/kg/min Last Rate: Stopped (10/16/19 0830)   sodium chloride 75 mL/hr Last Rate: 75 mL/hr (10/15/19 2040)     PRN Meds:.dextrose  •  dextrose  •  glucagon (human recombinant)  •  metoprolol tartrate  •  ondansetron  •  Pharmacy to Dose meropenem (MERREM)  •  Pharmacy to dose vancomycin  •  sodium chloride  •  sodium chloride  •  vancomycin      Physical Exam    tMax 24 hrs:  Temp (24hrs), Av °F (37.2 °C), Min:98.7 °F (37.1 °C), Max:99.2 °F (37.3 °C)    Vitals Ranges:  Temp:  [98.7 °F (37.1 °C)-99.2 °F (37.3 °C)] 98.7 °F (37.1 °C)  Heart Rate:  [] 116  BP: ()/() 124/96  Intake and Output Last 3 Shifts:  I/O last 3 completed shifts:  In: 6019 [I.V.:6019]  Out: 1050 [Urine:850; Stool:200]    Constitution:  NAD   HEENT:  Atraumatic, PERRL, conjunctiva normal, moist oral mucosa, no nasal discharge.  Trachea is midline.  Respiratory:  No respiratory distress. Diminished breath sounds bilaterally.    Cardiovascular:  AFIB  Pulses 2+ and equal in all four extremities.    GI:  Soft, nondistended.  Nontender to palpation.   Ileostomy  with stool output noted.   Extremities: No edema, cyanosis or tenderness.  Integument:  No rashes.   Neurologic:  Alert and oriented x 3.  Moves all four extremities to painful stimuli.   No focal neurologic deficits observed.      labs    Lab Results (last 24 hours)     Procedure Component  Value Units Date/Time    Lactic Acid, Plasma [[id number]]  (Normal) Collected:  10/16/19 0815    Specimen:  Blood Updated:  10/16/19 0916     Lactate 1.5 mmol/L     CBC & Differential [[id number]] Collected:  10/16/19 0400    Specimen:  Blood Updated:  10/16/19 0605    Narrative:       The following orders were created for panel order CBC & Differential.  Procedure                               Abnormality         Status                     ---------                               -----------         ------                     CBC Auto Differential[[id number]]        Abnormal            Final result                 Please view results for these tests on the individual orders.    CBC Auto Differential [[id number]]  (Abnormal) Collected:  10/16/19 0400    Specimen:  Blood Updated:  10/16/19 0605     WBC 20.50 10*3/mm3      RBC 3.59 10*6/mm3      Hemoglobin 11.0 g/dL      Hematocrit 34.2 %      MCV 95.2 fL      MCH 30.7 pg      MCHC 32.3 g/dL      RDW 18.0 %      RDW-SD 60.4 fl      MPV 8.6 fL      Platelets 160 10*3/mm3     Scan Slide [[id number]] Collected:  10/16/19 0400    Specimen:  Blood Updated:  10/16/19 0605     Scan Slide --     Comment: See Manual Differential Results       Manual Differential [[id number]]  (Abnormal) Collected:  10/16/19 0400    Specimen:  Blood Updated:  10/16/19 0605     Neutrophil % 86.0 %      Lymphocyte % 2.0 %      Monocyte % 7.0 %      Bands %  5.0 %      Neutrophils Absolute 18.66 10*3/mm3      Lymphocytes Absolute 0.41 10*3/mm3      Monocytes Absolute 1.44 10*3/mm3      nRBC 2.0 /100 WBC      Polychromasia Slight/1+     Toxic Granulation Slight/1+     Vacuolated Neutrophils Slight/1+     Platelet Morphology Normal    POC Glucose Once [[id number]]  (Abnormal) Collected:  10/16/19 0551    Specimen:  Blood Updated:  10/16/19 0552     Glucose 130 mg/dL      Comment: Serial Number: [id number]:  [id number]       Phosphorus [[id number]]  (Normal) Collected:  10/16/19 0400    Specimen:  Blood  Updated:  10/16/19 0443     Phosphorus 3.1 mg/dL     Basic Metabolic Panel [[id number]]  (Abnormal) Collected:  10/16/19 0400    Specimen:  Blood Updated:  10/16/19 0443     Glucose 162 mg/dL      BUN 63 mg/dL      Creatinine 2.65 mg/dL      Sodium 148 mmol/L      Potassium 3.6 mmol/L      Chloride 105 mmol/L      CO2 33.0 mmol/L      Calcium 7.2 mg/dL      eGFR Non African Amer 17 mL/min/1.73      BUN/Creatinine Ratio 23.8     Anion Gap 13.6 mmol/L     Narrative:       GFR Normal >60  Chronic Kidney Disease <60  Kidney Failure <15    Magnesium [[id number]]  (Abnormal) Collected:  10/16/19 0400    Specimen:  Blood Updated:  10/16/19 0442     Magnesium 2.4 mg/dL     Lactic Acid, Plasma [[id number]]  (Normal) Collected:  10/16/19 0400    Specimen:  Blood Updated:  10/16/19 0439     Lactate 1.6 mmol/L     Vancomycin, Random [[id number]]  (Normal) Collected:  10/16/19 0400    Specimen:  Blood Updated:  10/16/19 0439     Vancomycin Random 20.60 mcg/mL     aPTT [[id number]]  (Normal) Collected:  10/16/19 0400    Specimen:  Blood Updated:  10/16/19 0425     PTT 24.7 seconds     Protime-INR [[id number]]  (Abnormal) Collected:  10/16/19 0400    Specimen:  Blood Updated:  10/16/19 0425     Protime 20.1 Seconds      INR 2.10    POC Glucose Once [[id number]]  (Abnormal) Collected:  10/15/19 2335    Specimen:  Blood Updated:  10/15/19 2336     Glucose 165 mg/dL      Comment: Serial Number: [id number]:  [id number]       POC Glucose Once [[id number]]  (Abnormal) Collected:  10/15/19 2314    Specimen:  Blood Updated:  10/15/19 2317     Glucose 180 mg/dL      Comment: Serial Number: [id number]:  [id number]       Lactic Acid, Plasma [[id number]]  (Normal) Collected:  10/15/19 2217    Specimen:  Blood Updated:  10/15/19 2249     Lactate 1.7 mmol/L     MRSA Screen Culture - Swab, Nares [[id number]]  (Normal) Collected:  10/14/19 2036    Specimen:  Swab from Nares Updated:  10/15/19 2036     MRSA SCREEN CX No Methicillin Resistant  Staphylococcus aureus isolated    Lactic Acid, Reflex Timer (This will reflex a repeat order 3-3:15 hours after ordered.) [[id number]] Collected:  10/15/19 1513    Specimen:  Blood Updated:  10/15/19 1930     Extra Tube Hold for add-ons.     Comment: Auto resulted.       POC Glucose Once [[id number]]  (Abnormal) Collected:  10/15/19 1835    Specimen:  Blood Updated:  10/15/19 1836     Glucose 174 mg/dL      Comment: Serial Number: [id number]:  [id number]       Blood Culture - Blood, Blood, Venous Line [[id number]] Collected:  10/14/19 1716    Specimen:  Blood, Venous Line Updated:  10/15/19 1730     Blood Culture No growth at 24 hours    Blood Culture - Blood, Arm, Right [[id number]] Collected:  10/14/19 1647    Specimen:  Blood from Arm, Right Updated:  10/15/19 1703     Blood Culture No growth at 24 hours    Lactic Acid, Plasma [[id number]]  (Abnormal) Collected:  10/15/19 1513    Specimen:  Blood Updated:  10/15/19 1625     Lactate 3.5 mmol/L      Comment: Result checked        Basic Metabolic Panel [[id number]]  (Abnormal) Collected:  10/15/19 1121    Specimen:  Blood Updated:  10/15/19 1351     Glucose 213 mg/dL      BUN 68 mg/dL      Creatinine 3.18 mg/dL      Sodium 146 mmol/L      Potassium 4.2 mmol/L      Chloride 104 mmol/L      CO2 23.0 mmol/L      Calcium 7.3 mg/dL      eGFR   Amer --     Comment: <15 Indicative of kidney failure.        eGFR Non African Amer 14 mL/min/1.73      Comment: <15 Indicative of kidney failure.        BUN/Creatinine Ratio 21.4     Anion Gap 23.2 mmol/L     Narrative:       GFR Normal >60  Chronic Kidney Disease <60  Kidney Failure <15    POC Glucose Once [[id number]]  (Abnormal) Collected:  10/15/19 1327    Specimen:  Blood Updated:  10/15/19 1329     Glucose 239 mg/dL      Comment: Serial Number: [id number]:  [id number]       Urine Culture - Urine, Urine, Catheter [[id number]]  (Abnormal) Collected:  10/14/19 1929    Specimen:  Urine, Catheter Updated:  10/15/19  1317     Urine Culture >100,000 CFU/mL Escherichia coli    Hepatic Function Panel [[id number]]  (Abnormal) Collected:  10/15/19 1121    Specimen:  Blood Updated:  10/15/19 1232     Total Protein 5.3 g/dL      Albumin 3.00 g/dL      ALT (SGPT) 27 U/L      AST (SGOT) 78 U/L      Alkaline Phosphatase 94 U/L      Total Bilirubin 1.4 mg/dL      Bilirubin, Direct 0.9 mg/dL      Bilirubin, Indirect 0.5 mg/dL     Lactic Acid, Plasma [[id number]]  (Abnormal) Collected:  10/15/19 1121    Specimen:  Blood Updated:  10/15/19 1223     Lactate 4.2 mmol/L     Troponin [[id number]]  (Abnormal) Collected:  10/15/19 1121    Specimen:  Blood Updated:  10/15/19 1222     Troponin T 0.134 ng/mL     Narrative:       Troponin T Reference Range:  <= 0.03 ng/mL-   Negative for AMI  >0.03 ng/mL-     Abnormal for myocardial necrosis.  Clinicians would have to utilize clinical acumen, EKG, Troponin and serial changes to determine if it is an Acute Myocardial Infarction or myocardial injury due to an underlying chronic condition.     Vancomycin, Random [[id number]]  (Normal) Collected:  10/15/19 1121    Specimen:  Blood Updated:  10/15/19 1209     Vancomycin Random 16.50 mcg/mL           Imaging & Other Studies    Imaging Results (last 72 hours)     Procedure Component Value Units Date/Time    CT Abdomen Pelvis Without Contrast [[id number]] Collected:  10/14/19 1912     Updated:  10/14/19 1925    Narrative:          DATE OF EXAM:  10/14/2019 6:53 PM     PROCEDURE:  CT ABDOMEN PELVIS WO CONTRAST-     INDICATIONS:  pain; A41.9-Sepsis, unspecified organism; R65.21-Severe sepsis with  septic shock     COMPARISON:  CT scan of the abdomen and pelvis 02/16/2019     TECHNIQUE:  Routine transaxial slices were obtained through the abdomen and pelvis  without the administration of intravenous contrast. Reconstructed  coronal and sagittal images were also obtained. Automated exposure  control and iterative construction methods were used.     FINDINGS:  There are  moderate to large bilateral pleural effusions with associated  bibasilar dependent subsegmental atelectasis which are worsened from the  previous CT scan. There is a transvenous pacemaker. The liver and spleen  and adrenal glands and pancreas and kidneys appear normal. There is free  fluid deep in the pelvis unusual for a patient of this age. The patient  appears to have had a subtotal colectomy with only the rectum and distal  most aspect of the sigmoid colon remaining. There are scattered small  bowel air-fluid levels in nondistended loops. The small bowel within the  subcutaneous tissues passing from the abdominal wall to the skin does  show some redundancy however there is no significant small bowel  distention to suggest obstruction. There is mild diffuse increased soft  tissue stranding in the subcutaneous fat and mesenteric fat. There are  degenerative changes of the lumbar spine.       Impression:          1. Subtotal colectomy. There is an ileostomy in the right lower  quadrant. The subcutaneous portion of the ileal loop is slightly  redundant however there is no small bowel distention to suggest  obstruction. There are some scattered small bowel air-fluid levels in  nondistended loops which could reflect an associated mild ileus or  enteritis.  2. Moderate to large bilateral pleural effusions and dependent bibasilar  atelectasis. In addition there is a mild amount of ascites deep in the  pelvis and there is diffuse increased soft tissue stranding in the  subcutaneous fat and mesenteric fat. The constellation of these findings  does raise concern for changes of congestive heart failure.  3. Incidental note is made of a 3 cm right lateral hernia containing  mesenteric fat.     Electronically Signed By-Ulysses Carpenter On:10/14/2019 7:16 PM  This report was finalized on [id number] by  Ulysses Carpenter, .    XR Chest 1 View [[id number]] Collected:  10/14/19 1902     Updated:  10/14/19 1905    Narrative:        DATE OF EXAM:  10/14/2019 6:45 PM     PROCEDURE:  XR CHEST 1 VW-     INDICATIONS:  picc tip verification     COMPARISON: Chest x-ray 10/14/2019 at 5:00 PM     TECHNIQUE:   Single radiographic AP view of the chest was obtained.     FINDINGS:  There is been placement of a right PICC line with the tip in the  superior vena cava. There is cardiomegaly with a transvenous pacemaker.  There are extensive bilateral alveolar infiltrates and pleural effusions  with pulmonary vascular congestion which have worsened from the previous  study.        Impression:       Under  1. PICC line in good position with the tip in the superior vena cava.  2. Findings suggesting changes of pulmonary edema and congestive failure  which are worsened from the previous study.     Electronically Signed By-Ulysses Carpenter On:10/14/2019 7:03 PM  This report was finalized on [id number] by  Ulysses Carpenter, .    XR Chest 1 View [[id number]] Collected:  10/14/19 1713     Updated:  10/14/19 1716    Narrative:       DATE OF EXAM:  10/14/2019 5:00 PM     PROCEDURE:  XR CHEST 1 VW-     INDICATIONS:  soa     COMPARISON:  Chest x-ray 02/11/2019     TECHNIQUE:   Single radiographic AP view of the chest was obtained.     FINDINGS:  There is cardiomegaly with a transvenous pacemaker. There is no  significant pulmonary vascular congestion however there are extensive  bibasilar areas of infiltrate and consolidation and effusion. There is a  possible PICC line on the right with the tip in the right axillary vein.          Impression:       Renomegaly. Basilar infiltrates and effusions. The appearance is  somewhat more suggestive of changes of pulmonary edema and congestive  failure. An underlying pneumonia cannot be excluded.     Electronically Signed By-Ulysses Carpenter On:10/14/2019 5:14 PM  This report was finalized on [id number] by  Ulysses Carpenter, .          Assessment/plan  Septic shock likely related to urosepsis with hx of ESBL E. Coli UTI  Acute  hypoxic respiratory failure  Leukocytosis   Bilateral pleural effusions   Hyperkalemia  LEYLA  Atrial fib with RVR  Acute hypoxic respiratory failure  H/o Hypertension  H/o LLE DVT and PE  New onset CHF  Chronic anticoagulation with Eliquis  ?Ileus  H/o ileostomy r/t C. Diff  NSTEMI    Plan:    -cont Merrem (hx of ESBL UTI).  D/C Vanco  -Precision Flow and now on 8L high flow nasal cannula, cont to wean for sats 94%  -CXR with pulmonary edema  -ABGs reviewed   -resume AMIO gtt   -currently off AREN  -resp viral panel negative   -Urine cx + Ecoli   -blood cx negative   -TTE reviewed, EF 60%  -troponin 0.43  -lactic acid 5.2 and now 1.6  -replace electrolytes as needed   -IVF bolus of 1449 in the ER  -Insulin, calcium, D50 and bicarb for hyperkalemia, K+ 4.3 given at admission  -Nephrology consulted  -OFF HCO3 gtt  -IVFs, 1/2 NS 75ml/hr per renal  -restart anticoagulation when appropriate, INR 2.1 (Eliquis home med)   -Renal ultrasound negative  -Cardiology consulted   -Diureses per renal  -passed swallow eval, diet ordered   -PT eval    PICC    PUD: Famotidine  Insulin:  Sliding scale  VTE:  SCDs  Nutrition: Diet ordered     DNR

## 2019-10-16 NOTE — THERAPY EVALUATION
Acute Care - Occupational Therapy Initial Evaluation   Christiano     Patient Name: Dinora Vásquez  : 1926  MRN: [id number]  Today's Date: 10/16/2019             Admit Date: 10/14/2019       ICD-10-CM ICD-9-CM   1. Septic shock (CMS/[facility]) A41.9 038.9    R65.21 785.52     995.92   2. Paroxysmal atrial fibrillation (CMS/[facility]) I48.0 427.31   3. Acute congestive heart failure, unspecified heart failure type (CMS/[facility]) I50.9 428.0   4. Non-ST elevation myocardial infarction (NSTEMI) (CMS/[facility]) I21.4 410.70   5. Acute kidney injury (LEYLA) with acute tubular necrosis (ATN) (CMS/[facility]) N17.0 584.5     Patient Active Problem List   Diagnosis   • Septic shock (CMS/[facility])   • Atrial fibrillation with RVR (CMS/[facility])   • Acute on chronic renal failure (CMS/[facility])   • Elevated brain natriuretic peptide (BNP) level   • Elevated troponin     Past Medical History:   Diagnosis Date   • Ankle wound, right, initial encounter    • Aortic stenosis    • Atrial fibrillation (CMS/[facility])    • Coronary artery disease    • DVT (deep venous thrombosis) (CMS/[facility])    • Hypothyroidism    • Left bundle branch block    • Pulmonary embolus (CMS/[facility])    • Pulmonary hypertension (CMS/[facility])    • Sinus bradycardia      Past Surgical History:   Procedure Laterality Date   • COLOSTOMY     • ORIF ANKLE FRACTURE Right 2013    Right Ankle ORIF    • PACEMAKER IMPLANTATION  2017    Dual Chamber [employer]   • TOTAL ABDOMINAL HYSTERECTOMY            OT ASSESSMENT FLOWSHEET (last 12 hours)      Occupational Therapy Evaluation     Row Name 10/16/19 1100                   OT Evaluation Time/Intention    Subjective Information  no complaints  -ES (r) KB (t) ES (c)        Document Type  evaluation  -ES (r) KB (t) ES (c)        Mode of Treatment  occupational therapy  -ES (r) KB (t) ES (c)        Patient Effort  good  -ES (r) KB (t) ES (c)           General Information    Patient Profile Reviewed?  yes  -ES (r) KB (t) ES (c)        Patient/Family Observations   Pt supine in bed with HR monitor, O2 sensor, O2 nasal canula, catheter and ostomy in place.   -ES (r) KB (t) ES (c)        Prior Level of Function  max assist:;ADL's  -ES (r) KB (t) ES (c)        Equipment Currently Used at Home  wheelchair  -ES (r) KB (t) ES (c)        Pertinent History of Current Functional Problem  Pt is 89 yo female admitted to [facility] with hypotension, now being treated for A-fib, CHF, LEYLA,  and septic shock.  -ES (r) KB (t) ES (c)        Existing Precautions/Restrictions  fall  -ES (r) KB (t) ES (c)           Relationship/Environment    Lives With  facility resident Shakir Gonzalez   -ES (r) KB (t) ES (c)           Resource/Environmental Concerns    Current Living Arrangements  extended care facility  -ES (r) KB (t) ES (c)           Cognitive Assessment/Intervention- PT/OT    Orientation Status (Cognition)  oriented x 4  -ES (r) KB (t) ES (c)        Cognitive Assessment/Intervention Comment  SBT 10/28 indicating cognitive impairment   -ES (r) KB (t) ES (c)           Safety Issues, Functional Mobility    Safety Issues Affecting Function (Mobility)  insight into deficits/self awareness;judgment  -ES (r) KB (t) ES (c)        Impairments Affecting Function (Mobility)  balance;coordination;endurance/activity tolerance;strength  -ES (r) KB (t) ES (c)           Bed Mobility Assessment/Treatment    Bed Mobility Assessment/Treatment  supine-sit;sit-supine;rolling left  -ES (r) KB (t) ES (c)        Rolling Left [place] (Bed Mobility)  minimum assist (75% patient effort)  -ES (r) KB (t) ES (c)        Supine-Sit [place] (Bed Mobility)  moderate assist (50% patient effort);2 person assist  -ES (r) KB (t) ES (c)        Sit-Supine [place] (Bed Mobility)  moderate assist (50% patient effort);2 person assist  -ES (r) KB (t) ES (c)           Transfer Assessment/Treatment    Transfer Assessment/Treatment  sit-stand transfer;stand-sit transfer  -ES (r) KB (t) ES (c)           Sit-Stand Transfer     Sit-Stand [place] (Transfers)  moderate assist (50% patient effort);2 person assist  -ES (r) KB (t) ES (c)           Stand-Sit Transfer    Stand-Sit [place] (Transfers)  moderate assist (50% patient effort);2 person assist  -ES (r) KB (t) ES (c)           ADL Assessment/Intervention    BADL Assessment/Intervention  lower body dressing  -ES (r) KB (t) ES (c)           Lower Body Dressing Assessment/Training    Lower Body Dressing [place] Level  doff;don;socks;dependent (less than 25% patient effort)  -ES (r) KB (t) ES (c)        Lower Body Dressing Position  edge of bed sitting  -ES (r) KB (t) ES (c)           BADL Safety/Performance    Impairments, BADL Safety/Performance  balance;cognition;endurance/activity tolerance;coordination  -ES (r) KB (t) ES (c)        Cognitive Impairments, BADL Safety/Performance  attention;judgment;insight into deficits/self awareness  -ES (r) KB (t) ES (c)        Skilled BADL Treatment/Intervention  compensatory training;energy conservation  -ES (r) KB (t) ES (c)           General ROM    GENERAL ROM COMMENTS  PROM B shoulders WFL AROM 0-90*, B elbow wrist and hand WFL  -ES (r) KB (t) ES (c)           MMT (Manual Muscle Testing)    General MMT Comments  B shoulders 2/5 B elbow and wrist 3/5   -ES (r) KB (t) ES (c)           Motor Assessment/Interventions    Additional Documentation  Balance (Group)  -ES (r) KB (t) ES (c)           Balance    Balance  static sitting balance;static standing balance  -ES (r) KB (t) ES (c)           Static Sitting Balance    Level of [place] (Unsupported Sitting, Static Balance)  minimal assist, 75% patient effort  -ES (r) KB (t) ES (c)        Sitting Position (Unsupported Sitting, Static Balance)  sitting on edge of bed  -ES (r) KB (t) ES (c)        Time Able to Maintain Position (Unsupported Sitting, Static Balance)  1 to 2 minutes  -ES (r) KB (t) ES (c)        Comment (Unsupported Sitting, Static Balance)  Pt requires verbal and  tactile cues to maintain upright posture  -ES (r) KB (t) ES (c)           Static Standing Balance    Level of [place] (Supported Standing, Static Balance)  minimal assist, 75% patient effort;2 person assist  -ES (r) KB (t) ES (c)        Time Able to Maintain Position (Supported Standing, Static Balance)  30 to 45 seconds  -ES (r) KB (t) ES (c)           Sensory Assessment/Intervention    Sensory General Assessment  no sensation deficits identified  -ES (r) KB (t) ES (c)           Positioning and Restraints    Pre-Treatment Position  in bed  -ES (r) KB (t) ES (c)        Post Treatment Position  bed  -ES (r) KB (t) ES (c)        In Bed  notified nsg;supine;call light within reach;encouraged to call for assist;exit alarm on  -ES (r) KB (t) ES (c)           Pain Scale: Numbers Pre/Post-Treatment    Pain Scale: Numbers, Pretreatment  0/10 - no pain  -ES (r) KB (t) ES (c)        Pain Scale: Numbers, Post-Treatment  0/10 - no pain  -ES (r) KB (t) ES (c)           Health Promotion    Additional Documentation  Coping (Group)  -ES (r) KB (t) ES (c)           Coping    Observed Emotional State  accepting;calm;cooperative;pleasant  -ES (r) KB (t) ES (c)        Verbalized Emotional State  acceptance  -ES (r) KB (t) ES (c)           Plan of Care Review    Plan of Care Reviewed With  patient  -ES (r) KB (t) ES (c)           Clinical Impression (OT)    Criteria for Skilled Therapeutic Interventions Met (OT Eval)  yes  -ES (r) KB (t) ES (c)        Rehab Potential (OT Eval)  good, to achieve stated therapy goals  -ES (r) KB (t) ES (c)        Therapy Frequency (OT Eval)  3 times/wk  -ES (r) KB (t) ES (c)        Predicted Duration of Therapy Intervention (Therapy Eval)  Until discharge  -ES (r) KB (t) ES (c)        Anticipated Discharge Disposition (OT)  skilled nursing facility  -ES (r) KB (t) ES (c)           Vital Signs    Pre Systolic BP Rehab  122  -ES (r) KB (t) ES (c)        Pre Treatment Diastolic BP  59  -ES (r) KB (t) ES  (c)        Intra Systolic BP Rehab  105  -ES (r) KB (t) ES (c)        Intra Treatment Diastolic BP  67  -ES (r) KB (t) ES (c)        Post Systolic BP Rehab  116  -ES (r) KB (t) ES (c)        Post Treatment Diastolic BP  72  -ES (r) KB (t) ES (c)        Pretreatment Heart Rate (beats/min)  113  -ES (r) KB (t) ES (c)        Intratreatment Heart Rate (beats/min)  133  -ES (r) KB (t) ES (c)        Posttreatment Heart Rate (beats/min)  123  -ES (r) KB (t) ES (c)        Pre SpO2 (%)  98  -ES (r) KB (t) ES (c)        O2 Delivery Pre Treatment  supplemental O2 7L  -ES (r) KB (t) ES (c)        Intra SpO2 (%)  94  -ES (r) KB (t) ES (c)        O2 Delivery Intra Treatment  supplemental O2  -ES (r) KB (t) ES (c)        Post SpO2 (%)  100  -ES (r) KB (t) ES (c)        O2 Delivery Post Treatment  supplemental O2  -ES (r) KB (t) ES (c)        Pre Patient Position  Supine  -ES (r) KB (t) ES (c)        Intra Patient Position  Standing  -ES (r) KB (t) ES (c)        Post Patient Position  Supine  -ES (r) KB (t) ES (c)           Planned OT Interventions    Planned Therapy Interventions (OT Eval)  activity tolerance training;occupation/activity based interventions;strengthening exercise  -ES (r) KB (t) ES (c)           OT Goals    Dressing Goal Selection (OT)  dressing, OT goal 1  -ES (r) KB (t) ES (c)        Toileting Goal Selection (OT)  toileting, OT goal 1  -ES (r) KB (t) ES (c)        Activity Tolerance Goal Selection (OT)  activity tolerance, OT goal 1  -ES (r) KB (t) ES (c)        Additional Documentation  Activity Tolerance Goal Selection (OT) (Row)  -ES (r) KB (t) ES (c)           Dressing Goal 1 (OT)    Activity/Assistive Device (Dressing Goal 1, OT)  dressing skills, all  -ES (r) KB (t) ES (c)        [place]/Cues Needed (Dressing Goal 1, OT)  maximum assist (25-49% patient effort)  -ES (r) KB (t) ES (c)        Time Frame (Dressing Goal 1, OT)  2 weeks  -ES (r) KB (t) ES (c)           Toileting Goal 1 (OT)     Activity/Device (Toileting Goal 1, OT)  toileting skills, all  -ES (r) KB (t) ES (c)        [place] Level/Cues Needed (Toileting Goal 1, OT)  maximum assist (25-49% patient effort)  -ES (r) KB (t) ES (c)        Time Frame (Toileting Goal 1, OT)  2 weeks  -ES (r) KB (t) ES (c)            Activity Tolerance Goal 1 (OT)    Activity Tolerance Goal 1 (OT)  Pt will sit EOB independently for 5 minutes to perform ADLs.  -ES (r) KB (t) ES (c)        Activity Level (Endurance Goal 1, OT)  5 min activity  -ES (r) KB (t) ES (c)        Time Frame (Activity Tolerance Goal 1, OT)  2 weeks  -ES (r) KB (t) ES (c)          User Key  (r) = Recorded By, (t) = Taken By, (c) = Cosigned By    Initials Name Effective Dates    Fransisca Winslow, OT 03/01/19 -     Eliza Pal OT Student 07/08/19 -          Occupational Therapy Education     Title: PT OT SLP Therapies (In Progress)     Topic: Occupational Therapy (In Progress)     Point: ADL training (Done)     Description: Instruct learner(s) on proper safety adaptation and remediation techniques during self care or transfers.   Instruct in proper use of assistive devices.    Learning Progress Summary           Patient Acceptance, E,TB, VU by HENNY at 10/16/2019 11:27 AM    Comment:  Pt educated on proper hand and foot placement during functional transfers and bed mobility. Pt educated on energy conservation during ADLs.                   Point: Body mechanics (Done)     Description: Instruct learner(s) on proper positioning and spine alignment during self-care, functional mobility activities and/or exercises.    Learning Progress Summary           Patient Acceptance, E,TB, VU by HENNY at 10/16/2019 11:27 AM    Comment:  Pt educated on proper hand and foot placement during functional transfers and bed mobility. Pt educated on energy conservation during ADLs.                               User Key     Initials Effective Dates Name Provider Type Discipline    HENNY 07/08/19 -   Eliza Rodriguez, OT Student OT Student OT                  OT Recommendation and Plan  Outcome Summary/Treatment Plan (OT)  Anticipated Discharge Disposition (OT): skilled nursing facility  Planned Therapy Interventions (OT Eval): activity tolerance training, occupation/activity based interventions, strengthening exercise  Therapy Frequency (OT Eval): 3 times/wk  Plan of Care Review  Plan of Care Reviewed With: patient  Plan of Care Reviewed With: patient  Outcome Summary: Pt is 89 yo female admitted to [facility] with hypotension, now being treated for A-fib, CHF, LEYLA,  and septic shock. Pt is a resident at [facility] and reports receiving assistance with ADLs and functional transfers. Pt us w/c for mobility. Pt completed bed mobility and functional transfers with Mod A x 2 and LB dressing with total assistance. Pt requires Min A for EOB sitting and requires verbal and tactile cues to maintain upright posture. Pt is presenting below baseline and demo'ing decreased strength and activity tolerance. OT is recommending return to skilled facility with daily therapy services. OT will follow up with pt 3x/week to monitor and assess progress.         Time Calculation:   Time Calculation- OT     Row Name 10/16/19 1130             Time Calculation- OT    OT Start Time  1043  -ES (r) KB (t) ES (c)      OT Stop Time  1106  -ES (r) KB (t) ES (c)      OT Time Calculation (min)  23 min  -ES (r) KB (t)      Total Timed Code Minutes- OT  12 minute(s)  -ES (r) KB (t) ES (c)      OT Non-Billable Time (min)  11 min  -ES (r) KB (t) ES (c)      OT Received On  10/16/19  -ES (r) KB (t) ES (c)      OT - Next Appointment  10/18/19  -ES (r) KB (t) ES (c)      OT Goal Re-Cert Due Date  10/30/19  -ES (r) KB (t) ES (c)        User Key  (r) = Recorded By, (t) = Taken By, (c) = Cosigned By    Initials Name Provider Type    Fransisca Winslow OT Occupational Therapist    Eliza Pal, OT Student OT Student        Therapy Charges for  Today     Code Description Service Date Service Provider Modifiers Qty    39618623175 HC OT SELF CARE/MGMT/TRAIN EA 15 MIN 10/16/2019 Eliza Rodriguez, OT Student GO 1    59042621146 HC OT EVAL MOD COMPLEXITY 4 10/16/2019 Eliza Rodriguez, OT Student GO 1               Eliza Rodriguez, OT Student  10/16/2019

## 2019-10-16 NOTE — PROGRESS NOTES
"    Reason for follow-up: Atrial fibrillation with rapid ventricular rate  Status post permanent pacemaker placement  Sepsis     Patient Care Team:  Florentino Kimbrough MD as PCP - General (Family Medicine)  Gosia Hodgson MD as PCP - Family Medicine    Subjective .   Feels okay     Review of Systems   Constitution: Positive for malaise/fatigue.       Cephalexin; Sulfadiazine; Trimethoprim; and Trospium    Scheduled Meds:  apixaban 2.5 mg Oral Q12H   dexamethasone 1 drop Both Eyes Q8H   insulin lispro 0-7 Units Subcutaneous 4x Daily With Meals & Nightly   levothyroxine 75 mcg Oral QAM AC   meropenem 500 mg Intravenous Q24H   mirtazapine 7.5 mg Oral Nightly   polyethyl glycol-propyl glycol 1 drop Both Eyes BID   potassium chloride 20 mEq Oral Daily   sertraline 25 mg Oral Daily   sodium chloride 10 mL Intravenous Q12H     Continuous Infusions:  amiodarone 0.5 mg/min Last Rate: 0.5 mg/min (10/16/19 1213)   Pharmacy to Dose meropenem (MERREM)     phenylephrine 0.5-3 mcg/kg/min Last Rate: Stopped (10/16/19 0830)   sodium chloride 75 mL/hr Last Rate: 75 mL/hr (10/16/19 1540)     PRN Meds:.dextrose  •  dextrose  •  glucagon (human recombinant)  •  metoprolol tartrate  •  ondansetron  •  Pharmacy to Dose meropenem (MERREM)  •  sodium chloride  •  sodium chloride    Objective   Looks comfortable lying in the bed    VITAL SIGNS  Vitals:    10/16/19 1431 10/16/19 1530 10/16/19 1614 10/16/19 1713   BP:   129/61 131/56   Pulse:  108 111 108   Resp:       Temp:       TempSrc:       SpO2: 97% 96% 99% 99%   Weight:       Height:           Flowsheet Rows      First Filed Value   Admission Height  160 cm (63\") Documented at 10/14/2019 1551   Admission Weight  48.3 kg (106 lb 7.7 oz) Documented at 10/14/2019 1551           TELEMETRY: Atrial fibrillation with rapid ventricular rate    Physical Exam:  Physical Exam   Constitutional: She is oriented to person, place, and time. She appears well-developed and well-nourished. She is " cooperative.   HENT:   Head: Normocephalic and atraumatic.   Mouth/Throat: Uvula is midline and oropharynx is clear and moist. No oral lesions.   Eyes: Conjunctivae are normal. No scleral icterus.   Neck: Trachea normal. Neck supple. No JVD present. Carotid bruit is not present. No thyromegaly present.   Cardiovascular: Normal rate, S1 normal, S2 normal, normal heart sounds, intact distal pulses and normal pulses. An irregularly irregular rhythm present. PMI is not displaced. Exam reveals no gallop and no friction rub.   No murmur heard.  Pulmonary/Chest: Effort normal and breath sounds normal.   Abdominal: Soft. Bowel sounds are normal.   Musculoskeletal: Normal range of motion.   Neurological: She is alert and oriented to person, place, and time. She has normal strength.   No focal deficits   Skin: Skin is warm. No cyanosis.   Psychiatric: She has a normal mood and affect.                LAB RESULTS (LAST 7 DAYS)    CBC  Results from last 7 days   Lab Units 10/16/19  0400 10/15/19  0424 10/15/19  0034 10/14/19  1620   WBC 10*3/mm3 20.50* 24.90* 21.70* 18.80*   RBC 10*6/mm3 3.59* 3.72* 3.55* 4.29   HEMOGLOBIN g/dL 11.0* 11.2* 11.0* 13.1   HEMATOCRIT % 34.2 36.4 35.1 44.9   MCV fL 95.2 97.9* 98.9* 104.8*   PLATELETS 10*3/mm3 160 185 187 210       BMP  Results from last 7 days   Lab Units 10/16/19  0400 10/15/19  1121 10/15/19  0424 10/15/19  0029 10/14/19  1647 10/14/19  1619   SODIUM mmol/L 148* 146* 147* 148* 144 142   POTASSIUM mmol/L 3.6 4.2 4.6 4.3 5.2* 5.2*   CHLORIDE mmol/L 105 104 107 109 102 103   CO2 mmol/L 33.0* 23.0 20.0* 15.0* 14.0* 12.0*   BUN mg/dL 63* 68* 69* 67* 71* 71*   CREATININE mg/dL 2.65* 3.18* 3.50* 3.70* 3.80* 4.00*   GLUCOSE mg/dL 162* 213* 161* 101* 142* 152*   MAGNESIUM mg/dL 2.4*  --  2.8*  --   --  3.4*   PHOSPHORUS mg/dL 3.1  --  4.3  --   --  6.5*       CMP Results from last 7 days   Lab Units 10/16/19  0400 10/15/19  1121 10/15/19  0424 10/15/19  0029 10/14/19  1647 10/14/19  1613    SODIUM mmol/L 148* 146* 147* 148* 144 142   POTASSIUM mmol/L 3.6 4.2 4.6 4.3 5.2* 5.2*   CHLORIDE mmol/L 105 104 107 109 102 103   CO2 mmol/L 33.0* 23.0 20.0* 15.0* 14.0* 12.0*   BUN mg/dL 63* 68* 69* 67* 71* 71*   CREATININE mg/dL 2.65* 3.18* 3.50* 3.70* 3.80* 4.00*   GLUCOSE mg/dL 162* 213* 161* 101* 142* 152*   ALBUMIN g/dL  --  3.00*  --   --  3.50 3.50   BILIRUBIN mg/dL  --  1.4*  --   --  2.3* 2.1*   ALK PHOS U/L  --  94  --   --  97* 97*   AST (SGOT) U/L  --  78*  --   --  51* 51*   ALT (SGPT) U/L  --  27  --   --  <5* <5*         BNP  Results from last 7 days   Lab Units 10/14/19  1619   BNP pg/mL 1,784.0*       TROPONIN  Results from last 7 days   Lab Units 10/15/19  1121 10/15/19  0424   TROPONIN I ng/mL  --  0.430*   TROPONIN T ng/mL 0.134*  --        CoAg  Results from last 7 days   Lab Units 10/16/19  0400 10/15/19  0423 10/14/19  1619   INR  2.10* 3.14* 2.44*   APTT seconds 24.7  --  26.2       Creatinine Clearance  Estimated Creatinine Clearance: 12.8 mL/min (A) (by C-G formula based on SCr of 2.65 mg/dL (H)).    ABG  Results from last 7 days   Lab Units 10/15/19  0426 10/14/19  2358 10/14/19  1646   PH, ARTERIAL pH units 7.383 7.151* 7.185*   PCO2, ARTERIAL mm Hg 32.0* 22.2* 31.9*   PO2 ART mm Hg 93.3 91.2 19.5*   O2 SATURATION ART % 97.2 94.4 21.8*   BASE EXCESS ART mmol/L -5.1* -19.3* -15.1*       Radiology  Us Renal Limited    Result Date: 10/15/2019  Unremarkable retroperitoneal ultrasound examination. No evidence for hydronephrosis. Electronically signed by:  Jw Weiner M.D.  10/15/2019 3:23 AM    Xr Abdomen Kub    Result Date: 10/15/2019  1.Tip of the enteric tube terminates in the left mid abdomen, likely in the gastric body. 2.Nonspecific upper abdominal bowel gas pattern. 3.Moderate bilateral pleural effusions with underlying atelectasis.  Electronically Signed By-Gloria Heath On:10/15/2019 8:37 AM This report was finalized on [id number] by  Gloria Heath,  .            EKG        I personally viewed and interpreted the patient's EKG/Telemetry data:    ECHOCARDIOGRAM:    Results for orders placed during the hospital encounter of 10/14/19   Adult Transthoracic Echo Complete W/ Cont if Necessary Per Protocol    Narrative · Estimated EF = 60%.  · Left ventricular systolic function is normal.  · Right ventricular cavity is moderately dilated.  · Left atrial cavity size is mild-to-moderately dilated.  · There is calcification of the aortic valve.  · Mild-to-moderate mitral valve regurgitation is present  · Mild tricuspid valve regurgitation is present.        STRESS MYOVIEW:    CARDIAC CATHETERIZATION:    OTHER:         Assessment/Plan       Septic shock (CMS/HCC)    Atrial fibrillation with RVR (CMS/[facility])    Acute on chronic renal failure (CMS/[facility])    Elevated brain natriuretic peptide (BNP) level    Elevated troponin      Continue antibiotics per primary team  Continue IV amiodarone we will try IV digoxin 1 dose for rate control  Acute on chronic renal failure per primary team  Elevated BNP caution with diuresis  Elevated troponin probably demand ischemia continue conservative treatment    I discussed the patients findings and my recommendations with patient and attending nurse    Royal Palacios MD  10/16/19  7:40 PM

## 2019-10-16 NOTE — THERAPY EVALUATION
Acute Care - Physical Therapy Initial Evaluation   Christiano     Patient Name: Dinora Vásquez  : 1926  MRN: [id number]  Today's Date: 10/16/2019                Admit Date: 10/14/2019    Visit Dx:     ICD-10-CM ICD-9-CM   1. Septic shock (CMS/[facility]) A41.9 038.9    R65.21 785.52     995.92   2. Paroxysmal atrial fibrillation (CMS/[facility]) I48.0 427.31   3. Acute congestive heart failure, unspecified heart failure type (CMS/[facility]) I50.9 428.0   4. Non-ST elevation myocardial infarction (NSTEMI) (CMS/[facility]) I21.4 410.70   5. Acute kidney injury (LEYLA) with acute tubular necrosis (ATN) (CMS/[facility]) N17.0 584.5     Patient Active Problem List   Diagnosis   • Septic shock (CMS/[facility])   • Atrial fibrillation with RVR (CMS/[facility])   • Acute on chronic renal failure (CMS/[facility])   • Elevated brain natriuretic peptide (BNP) level   • Elevated troponin     Past Medical History:   Diagnosis Date   • Ankle wound, right, initial encounter    • Aortic stenosis    • Atrial fibrillation (CMS/[facility])    • Coronary artery disease    • DVT (deep venous thrombosis) (CMS/[facility])    • Hypothyroidism    • Left bundle branch block    • Pulmonary embolus (CMS/[facility])    • Pulmonary hypertension (CMS/[facility])    • Sinus bradycardia      Past Surgical History:   Procedure Laterality Date   • COLOSTOMY     • ORIF ANKLE FRACTURE Right 2013    Right Ankle ORIF    • PACEMAKER IMPLANTATION  2017    Dual Chamber [employer]   • TOTAL ABDOMINAL HYSTERECTOMY          PT ASSESSMENT (last 12 hours)      Physical Therapy Evaluation     Row Name 10/16/19 1041          PT Evaluation Time/Intention    Subjective Information  complains of;weakness  -HD     Document Type  evaluation  -HD     Mode of Treatment  physical therapy  -HD     Patient Effort  good  -HD     Row Name 10/16/19 1041          General Information    Patient Profile Reviewed?  yes  -HD     Prior Level of Function  max assist:;ADL's;mod assist:;w/c or scooter  -HD     Equipment Currently Used at Home   wheelchair  -HD     Pertinent History of Current Functional Problem  Pt is 89 yo female admitted for hypotension, A-fib, septic shock, NSTEMI, LEYLA, CHF.   -HD     Existing Precautions/Restrictions  fall  -HD     Row Name 10/16/19 1041          Relationship/Environment    Lives With  facility resident Shakir Gonzalez   -HD     Row Name 10/16/19 1041          Resource/Environmental Concerns    Current Living Arrangements  extended care facility  -HD     Row Name 10/16/19 1041          Cognitive Assessment/Intervention- PT/OT    Orientation Status (Cognition)  oriented x 4  -HD     Follows Commands (Cognition)  WFL  -HD     Row Name 10/16/19 1041          Safety Issues, Functional Mobility    Safety Issues Affecting Function (Mobility)  insight into deficits/self awareness;problem solving  -HD     Impairments Affecting Function (Mobility)  balance;endurance/activity tolerance;motor control;strength;shortness of breath  -HD     Row Name 10/16/19 1041          Bed Mobility Assessment/Treatment    Bed Mobility Assessment/Treatment  supine-sit;sit-supine  -HD     Rolling Left [place] (Bed Mobility)  minimum assist (75% patient effort)  -HD     Supine-Sit [place] (Bed Mobility)  moderate assist (50% patient effort)  -HD     Sit-Supine [place] (Bed Mobility)  moderate assist (50% patient effort)  -HD     Row Name 10/16/19 1041          Transfer Assessment/Treatment    Transfer Assessment/Treatment  sit-stand transfer;stand-sit transfer  -HD     Sit-Stand [place] (Transfers)  2 person assist;moderate assist (50% patient effort)  -HD     Stand-Sit [place] (Transfers)  2 person assist;moderate assist (50% patient effort)  -HD     Row Name 10/16/19 1041          Gait/Stairs Assessment/Training    [place] Level (Gait)  unable to assess  -HD     Row Name 10/16/19 1041          General ROM    GENERAL ROM COMMENTS  BLEs WFL  -HD     Row Name 10/16/19 1041          MMT (Manual Muscle Testing)    General  MMT Comments  BLEs 4-/5 MMT   -HD     [facility] Name 10/16/19 1041          Balance    Balance  static sitting balance;static standing balance  -HD     [facility] Name 10/16/19 1041          Static Sitting Balance    Level of [place] (Unsupported Sitting, Static Balance)  minimal assist, 75% patient effort  -HD     Sitting Position (Unsupported Sitting, Static Balance)  sitting on edge of bed  -HD     Time Able to Maintain Position (Unsupported Sitting, Static Balance)  1 to 2 minutes  -HD     Row Name 10/16/19 1041          Static Standing Balance    Level of [place] (Supported Standing, Static Balance)  2 person assist;minimal assist, 75% patient effort  -HD     Time Able to Maintain Position (Supported Standing, Static Balance)  30 to 45 seconds  -HD     [facility] Name 10/16/19 1041          Pain Assessment    Additional Documentation  Pain Scale: Numbers Pre/Post-Treatment (Group)  -HD     Row Name 10/16/19 1041          Pain Scale: Numbers Pre/Post-Treatment    Pain Scale: Numbers, Pretreatment  0/10 - no pain  -HD     Pain Scale: Numbers, Post-Treatment  0/10 - no pain  -HD     Row Name 10/16/19 1041          Plan of Care Review    Plan of Care Reviewed With  patient  -HD     [facility] Name 10/16/19 1041          Physical Therapy Clinical Impression    Patient/Family Goals Statement (PT Clinical Impression)  Increase strength  -HD     Criteria for Skilled Interventions Met (PT Clinical Impression)  yes;treatment indicated  -HD     Impairments Found (describe specific impairments)  aerobic capacity/endurance;gait, locomotion, and balance;ventilation and respiration/gas exchange  -HD     Rehab Potential (PT Clinical Summary)  good, to achieve stated therapy goals  -HD     Predicted Duration of Therapy (PT)  2 weeks   -HD     Row Name 10/16/19 1041          Vital Signs    Pre Systolic BP Rehab  122  -HD     Pre Treatment Diastolic BP  59  -HD     Intra Systolic BP Rehab  105  -HD     Intra Treatment Diastolic BP  67  -HD      Post Systolic BP Rehab  116  -HD     Post Treatment Diastolic BP  72  -HD     Pretreatment Heart Rate (beats/min)  113  -HD     Intratreatment Heart Rate (beats/min)  133  -HD     Pre SpO2 (%)  98  -HD     O2 Delivery Pre Treatment  supplemental O2 7L  -HD     Intra SpO2 (%)  94  -HD     O2 Delivery Intra Treatment  supplemental O2  -HD     Post SpO2 (%)  100  -HD     O2 Delivery Post Treatment  supplemental O2  -HD     Row Name 10/16/19 1041          Physical Therapy Goals    Bed Mobility Goal Selection (PT)  bed mobility, PT goal 1  -HD     Transfer Goal Selection (PT)  transfer, PT goal 1  -HD     Gait Training Goal Selection (PT)  --  -HD     Balance Goal Selection (PT)  balance, PT goal 1  -HD     Additional Documentation  Balance Goal Selection (PT) (Row)  -HD     Row Name 10/16/19 1041          Bed Mobility Goal 1 (PT)    Activity/Assistive Device (Bed Mobility Goal 1, PT)  bed mobility activities, all  -HD     [place] Level/Cues Needed (Bed Mobility Goal 1, PT)  contact guard assist  -HD     Time Frame (Bed Mobility Goal 1, PT)  2 weeks  -HD     Row Name 10/16/19 1041          Transfer Goal 1 (PT)    Activity/Assistive Device (Transfer Goal 1, PT)  transfers, all  -HD     [place] Level/Cues Needed (Transfer Goal 1, PT)  contact guard assist  -HD     Time Frame (Transfer Goal 1, PT)  2 weeks  -HD     Row Name 10/16/19 1041          Balance Goal 1 (PT)    Activity/Assistive Device (Balance Goal 1, PT)  sitting, static  -HD     [place] Level/Cues Needed (Balance Goal 1, PT)  standby assist  -HD     Time Frame (Balance Goal 1, PT)  2 weeks  -HD     Row Name 10/16/19 1041          Positioning and Restraints    Pre-Treatment Position  in bed  -HD     Post Treatment Position  bed  -HD     In Bed  notified nsg;call light within reach  -HD       User Key  (r) = Recorded By, (t) = Taken By, (c) = Cosigned By    Initials Name Provider Type    Valerie Eid, PT Physical Therapist        Physical  Therapy Education     Title: PT OT SLP Therapies (In Progress)     Topic: Physical Therapy (In Progress)     Point: Mobility training (In Progress)     Learning Progress Summary           Patient Acceptance, E, NR by HD at 10/16/2019 10:48 AM                   Point: Precautions (In Progress)     Learning Progress Summary           Patient Acceptance, E, NR by HD at 10/16/2019 10:48 AM                               User Key     Initials Effective Dates Name Provider Type Discipline     03/01/19 -  Valerie Rios, PT Physical Therapist PT              PT Recommendation and Plan  Anticipated Discharge Disposition (PT): skilled nursing facility  Planned Therapy Interventions (PT Eval): bed mobility training, balance training, neuromuscular re-education, patient/family education, ROM (range of motion), strengthening, transfer training, stretching  Therapy Frequency (PT Clinical Impression): 3 times/wk  Outcome Summary/Treatment Plan (PT)  Anticipated Discharge Disposition (PT): skilled nursing facility  Plan of Care Reviewed With: patient  Progress: improving  Outcome Summary: Pt is 89 yo female admitted for hypotension, septick shock, afib, NSTEMI, CHF, LEYLA.  Pt requiring modA-modAx2 for safety with bed mobility and transfers.  Pt fatigues quickly and exhibits tachycardia limiting mobility.  Transfer to chair not attempted due to tachycardia.  PT will follow 3x/week while at [facility].  PT is recommending SNF.      Time Calculation:   PT Charges     Row Name 10/16/19 1245             Time Calculation    Start Time  1041  -HD      Stop Time  1108  -HD      Time Calculation (min)  27 min  -HD      PT Received On  10/16/19  -HD      PT - Next Appointment  10/18/19  -HD      PT Goal Re-Cert Due Date  10/30/19  -HD         Time Calculation- PT    Total Timed Code Minutes- PT  15 minute(s)  -HD        User Key  (r) = Recorded By, (t) = Taken By, (c) = Cosigned By    Initials Name Provider Type    HD Valerie Rios, PT Physical  Therapist        Therapy Charges for Today     Code Description Service Date Service Provider Modifiers Qty    49338093941 HC PT EVAL MOD COMPLEXITY 4 10/16/2019 Valerie Rios, PT GP 1    29977205393 HC PT NEUROMUSC RE EDUCATION EA 15 MIN 10/16/2019 Valerie Rios, PT GP 1                 Valerie Rios, PT  10/16/2019

## 2019-10-16 NOTE — PLAN OF CARE
Problem: Patient Care Overview  Goal: Plan of Care Review   10/16/19 1355   Coping/Psychosocial   Plan of Care Reviewed With patient   OTHER   Outcome Summary Pt is 89 yo female admitted to [facility] with hypotension, now being treated for A-fib, CHF, LEYLA, and septic shock. Pt is a resident at [facility] and reports receiving assistance with ADLs and functional transfers. Pt us w/c for mobility. Pt completed bed mobility and functional transfers with Mod A x 2 and LB dressing with total assistance. Pt requires Min A for EOB sitting and requires verbal and tactile cues to maintain upright posture. Pt is presenting below baseline and demo'ing decreased strength and activity tolerance. OT is recommending return to skilled facility with daily therapy services. OT will follow up with pt 3x/week to monitor and assess progress.

## 2019-10-16 NOTE — PLAN OF CARE
Problem: Patient Care Overview  Goal: Plan of Care Review  Outcome: Ongoing (interventions implemented as appropriate)   10/16/19 1241   Coping/Psychosocial   Plan of Care Reviewed With patient   Plan of Care Review   Progress improving   OTHER   Outcome Summary Pt is 89 yo female admitted for hypotension, septick shock, afib, NSTEMI, CHF, LEYLA. Pt requiring modA-modAx2 for safety with bed mobility and transfers. Pt fatigues quickly and exhibits tachycardia limiting mobility. Transfer to chair not attempted due to tachycardia. PT will follow 3x/week while at [facility]. PT is recommending SNF.

## 2019-10-17 PROBLEM — I35.0 AORTIC STENOSIS: Status: ACTIVE | Noted: 2017-12-19

## 2019-10-17 PROBLEM — R65.21 SEPTIC SHOCK (HCC): Status: RESOLVED | Noted: 2019-01-01 | Resolved: 2019-01-01

## 2019-10-17 PROBLEM — A41.9 SEPTIC SHOCK (HCC): Status: RESOLVED | Noted: 2019-01-01 | Resolved: 2019-01-01

## 2019-10-17 PROBLEM — E03.9 HYPOTHYROIDISM: Status: ACTIVE | Noted: 2017-09-29

## 2019-10-17 PROBLEM — I44.2 AV BLOCK, COMPLETE (HCC): Status: ACTIVE | Noted: 2017-11-29

## 2019-10-17 PROBLEM — L97.309 CHRONIC ULCER OF ANKLE (HCC): Status: ACTIVE | Noted: 2018-06-11

## 2019-10-17 PROBLEM — I27.20 PULMONARY HYPERTENSION (HCC): Status: ACTIVE | Noted: 2017-12-19

## 2019-10-17 PROBLEM — I48.91 ATRIAL FIBRILLATION (HCC): Status: ACTIVE | Noted: 2017-09-29

## 2019-10-17 PROBLEM — Z95.0 PRESENCE OF CARDIAC PACEMAKER: Status: ACTIVE | Noted: 2017-12-19

## 2019-10-17 NOTE — PROGRESS NOTES
"                                                                                                                                      Nephrology  Progress Note                                        [facility]    Patient Identification    Name: Dinora Vásquez  Age: 89 y.o.  Sex: female  :  1926  MRN: [id number]      DATE OF SERVICE:  10/17/2019        Subective    Awake on less oxygen and off pressors     Objective   Scheduled Meds:    apixaban 2.5 mg Oral Q12H   dexamethasone 1 drop Both Eyes Q8H   insulin lispro 0-7 Units Subcutaneous 4x Daily With Meals & Nightly   levothyroxine 75 mcg Oral QAM AC   meropenem 500 mg Intravenous Q24H   mirtazapine 7.5 mg Oral Nightly   polyethyl glycol-propyl glycol 1 drop Both Eyes BID   potassium chloride 20 mEq Oral Daily   sertraline 25 mg Oral Daily   sodium chloride 10 mL Intravenous Q12H         Continuous Infusions:    amiodarone 0.5 mg/min Last Rate: 0.5 mg/min (10/16/19 8821)   Pharmacy to Dose meropenem (MERREM)     phenylephrine 0.5-3 mcg/kg/min Last Rate: Stopped (10/16/19 1730)   sodium chloride 75 mL/hr Last Rate: 75 mL/hr (10/16/19 1543)       PRN Meds:dextrose  •  dextrose  •  glucagon (human recombinant)  •  metoprolol tartrate  •  ondansetron  •  Pharmacy to Dose meropenem (MERREM)  •  sodium chloride  •  sodium chloride     Exam:  /50   Pulse 101   Temp 98.3 °F (36.8 °C) (Oral)   Resp 18   Ht 160 cm (63\")   Wt 60 kg (132 lb 4.4 oz)   SpO2 100%   BMI 23.43 kg/m²     Intake/Output last 3 shifts:  I/O last 3 completed shifts:  In: 4485 [P.O.:360; I.V.:4125]  Out: 1115 [Urine:1015; Stool:100]    Intake/Output this shift:  No intake/output data recorded.    Physical exam:  Awake and  Alert  PERTL  No JVD  Chest: decreased BS occasional ronchi  CVS Regular rate and rhythm, S1 and S2 normal  Abdomen:  Soft, non-tender, bowel sounds active   LE: trace edema  Skin:  No rashes or lesions       Data Review:  All labs (24hrs): "   Recent Results (from the past 24 hour(s))   Lactic Acid, Plasma    Collection Time: 10/16/19  8:15 AM   Result Value Ref Range    Lactate 1.5 0.5 - 2.0 mmol/L   POC Glucose Once    Collection Time: 10/16/19 11:27 AM   Result Value Ref Range    Glucose 178 (H) 70 - 105 mg/dL   POC Glucose Once    Collection Time: 10/16/19  6:12 PM   Result Value Ref Range    Glucose 118 (H) 70 - 105 mg/dL   POC Glucose Once    Collection Time: 10/16/19  7:34 PM   Result Value Ref Range    Glucose 116 (H) 70 - 105 mg/dL   Basic Metabolic Panel    Collection Time: 10/17/19  4:05 AM   Result Value Ref Range    Glucose 146 (H) 65 - 99 mg/dL    BUN 48 (H) 8 - 23 mg/dL    Creatinine 2.05 (H) 0.57 - 1.00 mg/dL    Sodium 142 136 - 145 mmol/L    Potassium 3.8 3.5 - 5.2 mmol/L    Chloride 100 98 - 107 mmol/L    CO2 31.0 (H) 22.0 - 29.0 mmol/L    Calcium 7.5 (L) 8.2 - 9.6 mg/dL    eGFR Non African Amer 23 (L) >60 mL/min/1.73    BUN/Creatinine Ratio 23.4 7.0 - 25.0    Anion Gap 14.8 5.0 - 15.0 mmol/L   Magnesium    Collection Time: 10/17/19  4:05 AM   Result Value Ref Range    Magnesium 2.0 1.7 - 2.3 mg/dL   Phosphorus    Collection Time: 10/17/19  4:05 AM   Result Value Ref Range    Phosphorus 2.8 2.5 - 4.5 mg/dL   Lactic Acid, Plasma    Collection Time: 10/17/19  4:05 AM   Result Value Ref Range    Lactate 1.6 0.5 - 2.0 mmol/L   CBC Auto Differential    Collection Time: 10/17/19  4:05 AM   Result Value Ref Range    WBC 13.10 (H) 3.40 - 10.80 10*3/mm3    RBC 3.57 (L) 3.77 - 5.28 10*6/mm3    Hemoglobin 11.0 (L) 12.0 - 15.9 g/dL    Hematocrit 34.7 34.0 - 46.6 %    MCV 97.0 79.0 - 97.0 fL    MCH 30.9 26.6 - 33.0 pg    MCHC 31.8 31.5 - 35.7 g/dL    RDW 18.2 (H) 12.3 - 15.4 %    RDW-SD 62.1 (H) 37.0 - 54.0 fl    MPV 7.8 6.0 - 12.0 fL    Platelets 116 (L) 140 - 450 10*3/mm3    Neutrophil % 85.2 (H) 42.7 - 76.0 %    Lymphocyte % 6.4 (L) 19.6 - 45.3 %    Monocyte % 7.6 5.0 - 12.0 %    Eosinophil % 0.5 0.3 - 6.2 %    Basophil % 0.3 0.0 - 1.5 %     Neutrophils, Absolute 11.20 (H) 1.70 - 7.00 10*3/mm3    Lymphocytes, Absolute 0.80 0.70 - 3.10 10*3/mm3    Monocytes, Absolute 1.00 (H) 0.10 - 0.90 10*3/mm3    Eosinophils, Absolute 0.10 0.00 - 0.40 10*3/mm3    Basophils, Absolute 0.00 0.00 - 0.20 10*3/mm3    nRBC 0.3 (H) 0.0 - 0.2 /100 WBC   POC Glucose Once    Collection Time: 10/17/19  7:32 AM   Result Value Ref Range    Glucose 128 (H) 70 - 105 mg/dL          Imaging:  [unfilled]    Assessment/Plan:     Septic shock (CMS/HCC)    Atrial fibrillation with RVR (CMS/HCC)    Acute on chronic renal failure (CMS/HCC)    Elevated brain natriuretic peptide (BNP) level    Elevated troponin     Acute kidney injury on chronic kidney disease stage III baseline cr 1.4              -Creatinine up to 2    -Half-normal saline   - labs pm  Hyponatremia on half-normal saline  Hypocalcemia replace  Acute hypoxic respiratory failure  Septic shock  Urosepsis  Leukocytosis  Pleural effusions  Status post hyperkalemia  Atrial fibrillation with rapid ventricular response  History of hypertension  History of lower extremity DVT and PE  New onset congestive heart failure  Abdominal pain with possible ileus  Non-ST elevation MI

## 2019-10-17 NOTE — PROGRESS NOTES
Continued Stay Note  LULY Alvarado     Patient Name: Dinora Vásquez  MRN: [id number]  Today's Date: 10/17/2019    Admit Date: 10/14/2019    Discharge Plan     Row Name 10/17/19 1113       Plan    Plan  Return to [facility] [place] pending pre-cert.  Pre-cert started 10/17.  No PASRR needed.  Pt came from [facility] private pay but attempting to return skilled.       Meme Wren, MADDYW, LSW    Phone:  [phone]

## 2019-10-17 NOTE — PLAN OF CARE
Problem: Fall Risk (Adult)  Goal: Identify Related Risk Factors and Signs and Symptoms  Outcome: Ongoing (interventions implemented as appropriate)    Goal: Absence of Fall  Outcome: Ongoing (interventions implemented as appropriate)      Problem: Patient Care Overview  Goal: Plan of Care Review  Outcome: Ongoing (interventions implemented as appropriate)    Goal: Individualization and Mutuality  Outcome: Ongoing (interventions implemented as appropriate)    Goal: Discharge Needs Assessment  Outcome: Ongoing (interventions implemented as appropriate)    Goal: Interprofessional Rounds/Family Conf  Outcome: Ongoing (interventions implemented as appropriate)      Problem: Skin Injury Risk (Adult)  Goal: Identify Related Risk Factors and Signs and Symptoms  Outcome: Ongoing (interventions implemented as appropriate)    Goal: Skin Health and Integrity  Outcome: Ongoing (interventions implemented as appropriate)      Problem: Sepsis/Septic Shock (Adult)  Goal: Signs and Symptoms of Listed Potential Problems Will be Absent, Minimized or Managed (Sepsis/Septic Shock)  Outcome: Ongoing (interventions implemented as appropriate)

## 2019-10-17 NOTE — PAYOR COMM NOTE
"Resending this review (sent back to me saying you could not locate this member  -  I had one too many 3's in the reference number - corrected on this form  Per call to Cornelio at [facility] - he said this was approved for 3 days and update due today 10/17/2019    RE:  REF   PENDING [id number]    UTILIZATION REVIEW  RETURN CONTACT:  AVTAR DUARTE RN CCM  PH:   FAX: [phone]  [facility]  NPI# [id number]  TAX ID # [id number]      SENDING CLINICAL UPDATE FOR INPATIENT ADMISSION 10/14/2019  =  REMAINS IN ICU    PENDING AUTH REPLY    DX: ATRIAL FIB, S/P PPM PLACEMENT, SEPSIS/SEPTIC SHOCK    Dinora Hdez (89 y.o. Female)     Date of Birth Social Security Number Address Home Phone MRN    06/18/1926  [id number] [place] [address] [phone] [id number]    Voodoo Marital Status          None        Admission Date Admission Type Admitting Provider Attending Provider Department, Room/Bed    10/14/19 Emergency Amanda Kaba MD Khan, Zaka Urrehman, MD [facility] INTENSIVE CARE UNIT, 2309/1    Discharge Date Discharge Disposition Discharge Destination                       Attending Provider:  Amanda Kaba MD    Allergies:  Cephalexin, Sulfadiazine, Trimethoprim, Trospium    Isolation:  Contact   Infection:  ESBL E coli (10/15/19)   Code Status:  No CPR    Ht:  160 cm (63\")   Wt:  60 kg (132 lb 4.4 oz)    Admission Cmt:  None   Principal Problem:  None                Active Insurance as of 10/14/2019     Primary Coverage     Payor Plan Insurance Group Employer/Plan Group    [facility] MEDICARE REPLACEMENT [employer] MED[employer]ARE ADV[employer]AGE [id number]     Payor Plan Address Payor Plan Phone Number Payor Plan Fax Number Effective Dates    [address] [phone]  1/1/2015 - None Entered    [facility] [address]       Subscriber Name Subscriber Birth Date Member ID       OLIMPIA HDEZJUSTYNA PADILLA 6/18/1926 [id number]                 Emergency Contacts      " (Rel.) Home Phone Work Phone Mobile Phone    PEYTON MALCOLM (Care Giver) [phone] -- [phone]        10/17  VS- 98.3 101 115/50, 147/103  SATS 100% ON 2L  10/17 LABS:  CREATININE 2.05 H, WBC 13.10 H, HGB 11,   --------------------------------           Physician Progress Notes (last 24 hours) (Notes from 10/16/19 0735 through 10/17/19 0735)      Royal Palacios MD at 10/16/19 1940              Reason for follow-up: Atrial fibrillation with rapid ventricular rate  Status post permanent pacemaker placement  Sepsis     Patient Care Team:  Florentino Kimbrough MD as PCP - General (Family Medicine)  Gosia Hodgson MD as PCP - Family Medicine    Subjective .   Feels okay     Review of Systems   Constitution: Positive for malaise/fatigue.       Cephalexin; Sulfadiazine; Trimethoprim; and Trospium    Scheduled Meds:  apixaban 2.5 mg Oral Q12H   dexamethasone 1 drop Both Eyes Q8H   insulin lispro 0-7 Units Subcutaneous 4x Daily With Meals & Nightly   levothyroxine 75 mcg Oral QAM AC   meropenem 500 mg Intravenous Q24H   mirtazapine 7.5 mg Oral Nightly   polyethyl glycol-propyl glycol 1 drop Both Eyes BID   potassium chloride 20 mEq Oral Daily   sertraline 25 mg Oral Daily   sodium chloride 10 mL Intravenous Q12H     Continuous Infusions:  amiodarone 0.5 mg/min Last Rate: 0.5 mg/min (10/16/19 1213)   Pharmacy to Dose meropenem (MERREM)     phenylephrine 0.5-3 mcg/kg/min Last Rate: Stopped (10/16/19 0830)   sodium chloride 75 mL/hr Last Rate: 75 mL/hr (10/16/19 1540)     PRN Meds:.dextrose  •  dextrose  •  glucagon (human recombinant)  •  metoprolol tartrate  •  ondansetron  •  Pharmacy to Dose meropenem (MERREM)  •  sodium chloride  •  sodium chloride    Objective   Looks comfortable lying in the bed    VITAL SIGNS  Vitals:    10/16/19 1431 10/16/19 1530 10/16/19 1614 10/16/19 1713   BP:   129/61 131/56   Pulse:  108 111 108   Resp:       Temp:       TempSrc:       SpO2: 97% 96% 99% 99%   Weight:       Height:     "       Flowsheet Rows      First Filed Value   Admission Height  160 cm (63\") Documented at 10/14/2019 1551   Admission Weight  48.3 kg (106 lb 7.7 oz) Documented at 10/14/2019 1551           TELEMETRY: Atrial fibrillation with rapid ventricular rate    Physical Exam:  Physical Exam   Constitutional: She is oriented to person, place, and time. She appears well-developed and well-nourished. She is cooperative.   HENT:   Head: Normocephalic and atraumatic.   Mouth/Throat: Uvula is midline and oropharynx is clear and moist. No oral lesions.   Eyes: Conjunctivae are normal. No scleral icterus.   Neck: Trachea normal. Neck supple. No JVD present. Carotid bruit is not present. No thyromegaly present.   Cardiovascular: Normal rate, S1 normal, S2 normal, normal heart sounds, intact distal pulses and normal pulses. An irregularly irregular rhythm present. PMI is not displaced. Exam reveals no gallop and no friction rub.   No murmur heard.  Pulmonary/Chest: Effort normal and breath sounds normal.   Abdominal: Soft. Bowel sounds are normal.   Musculoskeletal: Normal range of motion.   Neurological: She is alert and oriented to person, place, and time. She has normal strength.   No focal deficits   Skin: Skin is warm. No cyanosis.   Psychiatric: She has a normal mood and affect.                LAB RESULTS (LAST 7 DAYS)    CBC  Results from last 7 days   Lab Units 10/16/19  0400 10/15/19  0424 10/15/19  0034 10/14/19  1620   WBC 10*3/mm3 20.50* 24.90* 21.70* 18.80*   RBC 10*6/mm3 3.59* 3.72* 3.55* 4.29   HEMOGLOBIN g/dL 11.0* 11.2* 11.0* 13.1   HEMATOCRIT % 34.2 36.4 35.1 44.9   MCV fL 95.2 97.9* 98.9* 104.8*   PLATELETS 10*3/mm3 160 185 187 210       BMP  Results from last 7 days   Lab Units 10/16/19  0400 10/15/19  1121 10/15/19  0424 10/15/19  0029 10/14/19  1647 10/14/19  1619   SODIUM mmol/L 148* 146* 147* 148* 144 142   POTASSIUM mmol/L 3.6 4.2 4.6 4.3 5.2* 5.2*   CHLORIDE mmol/L 105 104 107 109 102 103   CO2 mmol/L 33.0* " 23.0 20.0* 15.0* 14.0* 12.0*   BUN mg/dL 63* 68* 69* 67* 71* 71*   CREATININE mg/dL 2.65* 3.18* 3.50* 3.70* 3.80* 4.00*   GLUCOSE mg/dL 162* 213* 161* 101* 142* 152*   MAGNESIUM mg/dL 2.4*  --  2.8*  --   --  3.4*   PHOSPHORUS mg/dL 3.1  --  4.3  --   --  6.5*       CMP Results from last 7 days   Lab Units 10/16/19  0400 10/15/19  1121 10/15/19  0424 10/15/19  0029 10/14/19  1647 10/14/19  1619   SODIUM mmol/L 148* 146* 147* 148* 144 142   POTASSIUM mmol/L 3.6 4.2 4.6 4.3 5.2* 5.2*   CHLORIDE mmol/L 105 104 107 109 102 103   CO2 mmol/L 33.0* 23.0 20.0* 15.0* 14.0* 12.0*   BUN mg/dL 63* 68* 69* 67* 71* 71*   CREATININE mg/dL 2.65* 3.18* 3.50* 3.70* 3.80* 4.00*   GLUCOSE mg/dL 162* 213* 161* 101* 142* 152*   ALBUMIN g/dL  --  3.00*  --   --  3.50 3.50   BILIRUBIN mg/dL  --  1.4*  --   --  2.3* 2.1*   ALK PHOS U/L  --  94  --   --  97* 97*   AST (SGOT) U/L  --  78*  --   --  51* 51*   ALT (SGPT) U/L  --  27  --   --  <5* <5*         BNP  Results from last 7 days   Lab Units 10/14/19  1619   BNP pg/mL 1,784.0*       TROPONIN  Results from last 7 days   Lab Units 10/15/19  1121 10/15/19  0424   TROPONIN I ng/mL  --  0.430*   TROPONIN T ng/mL 0.134*  --        CoAg  Results from last 7 days   Lab Units 10/16/19  0400 10/15/19  0423 10/14/19  1619   INR  2.10* 3.14* 2.44*   APTT seconds 24.7  --  26.2       Creatinine Clearance  Estimated Creatinine Clearance: 12.8 mL/min (A) (by C-G formula based on SCr of 2.65 mg/dL (H)).    ABG  Results from last 7 days   Lab Units 10/15/19  0426 10/14/19  2358 10/14/19  1646   PH, ARTERIAL pH units 7.383 7.151* 7.185*   PCO2, ARTERIAL mm Hg 32.0* 22.2* 31.9*   PO2 ART mm Hg 93.3 91.2 19.5*   O2 SATURATION ART % 97.2 94.4 21.8*   BASE EXCESS ART mmol/L -5.1* -19.3* -15.1*       Radiology  Us Renal Limited    Result Date: 10/15/2019  Unremarkable retroperitoneal ultrasound examination. No evidence for hydronephrosis. Electronically signed by:  Jw Weiner M.D.  10/15/2019 3:23  AM    Xr Abdomen Kub    Result Date: 10/15/2019  1.Tip of the enteric tube terminates in the left mid abdomen, likely in the gastric body. 2.Nonspecific upper abdominal bowel gas pattern. 3.Moderate bilateral pleural effusions with underlying atelectasis.  Electronically Signed By-Gloria Heath On:10/15/2019 8:37 AM This report was finalized on [id number] by  Gloria Heath, .            EKG        I personally viewed and interpreted the patient's EKG/Telemetry data:    ECHOCARDIOGRAM:    Results for orders placed during the hospital encounter of 10/14/19   Adult Transthoracic Echo Complete W/ Cont if Necessary Per Protocol    Narrative · Estimated EF = 60%.  · Left ventricular systolic function is normal.  · Right ventricular cavity is moderately dilated.  · Left atrial cavity size is mild-to-moderately dilated.  · There is calcification of the aortic valve.  · Mild-to-moderate mitral valve regurgitation is present  · Mild tricuspid valve regurgitation is present.        STRESS MYOVIEW:    CARDIAC CATHETERIZATION:    OTHER:         Assessment/Plan       Septic shock (CMS/HCC)    Atrial fibrillation with RVR (CMS/HCC)    Acute on chronic renal failure (CMS/HCC)    Elevated brain natriuretic peptide (BNP) level    Elevated troponin      Continue antibiotics per primary team  Continue IV amiodarone we will try IV digoxin 1 dose for rate control  Acute on chronic renal failure per primary team  Elevated BNP caution with diuresis  Elevated troponin probably demand ischemia continue conservative treatment    I discussed the patients findings and my recommendations with patient and attending nurse    Royal Palacios MD  10/16/19  7:40 PM                Electronically signed by Royal Palacios MD at 10/16/19 1941     Haydee Meier APRN at 10/16/19 5757     Attestation signed by Amanda Kaba MD at 10/16/19 2051    Attending physician statement:  Above note scribed by nurse practitioner  for me and later reviewed for accuracy. I've examined the patient and reviewed all labs and images.   I have directly participated in the evaluation and management of this patient.  Amanda Kaba MD                      Pulmonary/ Critical Care progress Note        Patient Name:  Dinora Vásquez    :  1926    Medical Record:  [id number]    Primary Care Physician     Florentino Kimbrough MD [facility]  Dinora Vásquez is a 89 y.o. female who was presented to the ER after being seen earlier by Nephrology and found to be hypotensive and tachycardic; in addition she complained of dyspnea, abdominal pain and not feeling well.  She had a bolus of IVFs started and transferred to the ER.  Workup in the ER revealed afib with RVR, UTI, septic shock, CHF, STEMI, acute hypoxic respiratory failure and LEYLA.  She had IVFs per sepsis protocol given and started on Meropenem.  She was given a bolus of amio and Cardizem without improvement in her heart rate and subsequently developed hypotension.  She had a PICC ordered by the ER and was started on Levophed.   The patient is currently difficult to get a history from as she is obtunded.  Her sats were in the 70's and she was started on Precision Flow with improvement.      10/15/19: Short of breath with minimal activity.  Remains on Precision flow supplemental oxygen.  No chest pains.  No nausea or vomiting  10/16/19:  Improved today on 8L high flow nasal cannula.  HR remains 120s.  No abdominal pain.  No CP    Review of Systems    As above        Home medicationS  Prior to Admission medications    Medication Sig Start Date End Date Taking? Authorizing Provider   cholecalciferol (VITAMIN D3) 1000 units tablet Daily. 17   Aravind Silva MD   Cranberry 600 MG tablet Take 600 mg by mouth 3 (Three) Times a Day.    Aravind Silva MD   ELIQUIS 2.5 MG tablet tablet Take 2.5 mg by mouth 2 (Two) Times a Day. 19   Aravind Silva MD   fluorometholone (FML)  0.1 % ophthalmic suspension Administer 1 Drop/kg to both eyes Daily. 5/21/19   Aravind Silva MD   levothyroxine (SYNTHROID, LEVOTHROID) 75 MCG tablet Every Morning Before Breakfast. 6/11/19   Aravind Silva MD   loratadine (CLARITIN) 10 MG tablet Take 10 mg by mouth Daily.    Aravind Silva MD   Magnesium 400 MG tablet MAGNESIUM 400 MG TABS 11/2/18   Aravind Silva MD   metoprolol tartrate (LOPRESSOR) 50 MG tablet Take 50 mg by mouth 2 (Two) Times a Day. 6/11/19   Aravind Silva MD   mirtazapine (REMERON) 7.5 MG tablet 7.5 mg Daily. 6/18/19   Aravind Silva MD   polyvinyl alcohol (ARTIFICIAL TEARS) 1.4 % ophthalmic solution Every 12 (Twelve) Hours. 11/2/18   Aravind Silva MD   Prenatal w/o A Vit-Fe Fum-FA (BP MULTINATAL PLUS) 30-1 MG tablet Daily. 12/19/17   Aravind Silva MD   saccharomyces boulardii (FLORASTOR) 250 MG capsule FLORASTOR 250 MG CAPS 6/11/18   Aravind Silva MD   sertraline (ZOLOFT) 25 MG tablet Take 25 mg by mouth Daily. 6/11/19   Aravind Silva MD       Medical History    Past Medical History:   Diagnosis Date   • Ankle wound, right, initial encounter    • Aortic stenosis    • Atrial fibrillation (CMS/HCC)    • Coronary artery disease    • DVT (deep venous thrombosis) (CMS/HCC)    • Hypothyroidism    • Left bundle branch block    • Pulmonary embolus (CMS/HCC)    • Pulmonary hypertension (CMS/HCC)    • Sinus bradycardia         Surgical History    Past Surgical History:   Procedure Laterality Date   • COLOSTOMY     • ORIF ANKLE FRACTURE Right 2013    Right Ankle ORIF    • PACEMAKER IMPLANTATION  11/29/2017    Dual Chamber [employer]   • TOTAL ABDOMINAL HYSTERECTOMY          Family History    Family History   Problem Relation Age of Onset   • Heart disease Mother    • Diabetes Mother    • Heart disease Sister    • Diabetes Sister        Social History    Social History     Tobacco Use   • Smoking status: Never Smoker   •  Smokeless tobacco: Never Used   Substance Use Topics   • Alcohol use: No     Frequency: Never        Allergies    Allergies   Allergen Reactions   • Cephalexin Swelling   • Sulfadiazine Rash   • Trimethoprim Hives   • Trospium Hives       Medications    Scheduled Meds:    dexamethasone 1 drop Both Eyes Q8H   famotidine 20 mg Oral Q AM   insulin lispro 0-7 Units Subcutaneous 4x Daily With Meals & Nightly   levothyroxine 75 mcg Oral QAM AC   meropenem 500 mg Intravenous Q24H   mirtazapine 7.5 mg Oral Nightly   polyethyl glycol-propyl glycol 1 drop Both Eyes BID   potassium chloride 20 mEq Oral Daily   sertraline 25 mg Oral Daily   sodium chloride 10 mL Intravenous Q12H     Continuous Infusions:    Pharmacy to Dose meropenem (MERREM)     Pharmacy to dose vancomycin     phenylephrine 0.5-3 mcg/kg/min Last Rate: Stopped (10/16/19 0830)   sodium chloride 75 mL/hr Last Rate: 75 mL/hr (10/15/19 2040)     PRN Meds:.dextrose  •  dextrose  •  glucagon (human recombinant)  •  metoprolol tartrate  •  ondansetron  •  Pharmacy to Dose meropenem (MERREM)  •  Pharmacy to dose vancomycin  •  sodium chloride  •  sodium chloride  •  vancomycin      Physical Exam    tMax 24 hrs:  Temp (24hrs), Av °F (37.2 °C), Min:98.7 °F (37.1 °C), Max:99.2 °F (37.3 °C)    Vitals Ranges:  Temp:  [98.7 °F (37.1 °C)-99.2 °F (37.3 °C)] 98.7 °F (37.1 °C)  Heart Rate:  [] 116  BP: ()/() 124/96  Intake and Output Last 3 Shifts:  I/O last 3 completed shifts:  In: 6019 [I.V.:6019]  Out: 1050 [Urine:850; Stool:200]    Constitution:  NAD   HEENT:  Atraumatic, PERRL, conjunctiva normal, moist oral mucosa, no nasal discharge.  Trachea is midline.  Respiratory:  No respiratory distress. Diminished breath sounds bilaterally.    Cardiovascular:  AFIB  Pulses 2+ and equal in all four extremities.    GI:  Soft, nondistended.  Nontender to palpation.   Ileostomy  with stool output noted.   Extremities: No edema, cyanosis or tenderness.  Integument:   No rashes.   Neurologic:  Alert and oriented x 3.  Moves all four extremities to painful stimuli.   No focal neurologic deficits observed.      labs    Lab Results (last 24 hours)     Procedure Component Value Units Date/Time    Lactic Acid, Plasma [[id number]]  (Normal) Collected:  10/16/19 0815    Specimen:  Blood Updated:  10/16/19 0916     Lactate 1.5 mmol/L     CBC & Differential [[id number]] Collected:  10/16/19 0400    Specimen:  Blood Updated:  10/16/19 0605    Narrative:       The following orders were created for panel order CBC & Differential.  Procedure                               Abnormality         Status                     ---------                               -----------         ------                     CBC Auto Differential[[id number]]        Abnormal            Final result                 Please view results for these tests on the individual orders.    CBC Auto Differential [[id number]]  (Abnormal) Collected:  10/16/19 0400    Specimen:  Blood Updated:  10/16/19 0605     WBC 20.50 10*3/mm3      RBC 3.59 10*6/mm3      Hemoglobin 11.0 g/dL      Hematocrit 34.2 %      MCV 95.2 fL      MCH 30.7 pg      MCHC 32.3 g/dL      RDW 18.0 %      RDW-SD 60.4 fl      MPV 8.6 fL      Platelets 160 10*3/mm3     Scan Slide [[id number]] Collected:  10/16/19 0400    Specimen:  Blood Updated:  10/16/19 0605     Scan Slide --     Comment: See Manual Differential Results       Manual Differential [[id number]]  (Abnormal) Collected:  10/16/19 0400    Specimen:  Blood Updated:  10/16/19 0605     Neutrophil % 86.0 %      Lymphocyte % 2.0 %      Monocyte % 7.0 %      Bands %  5.0 %      Neutrophils Absolute 18.66 10*3/mm3      Lymphocytes Absolute 0.41 10*3/mm3      Monocytes Absolute 1.44 10*3/mm3      nRBC 2.0 /100 WBC      Polychromasia Slight/1+     Toxic Granulation Slight/1+     Vacuolated Neutrophils Slight/1+     Platelet Morphology Normal    POC Glucose Once [[id number]]  (Abnormal) Collected:  10/16/19 5751     Specimen:  Blood Updated:  10/16/19 0552     Glucose 130 mg/dL      Comment: Serial Number: [id number]:  [id number]       Phosphorus [[id number]]  (Normal) Collected:  10/16/19 0400    Specimen:  Blood Updated:  10/16/19 0443     Phosphorus 3.1 mg/dL     Basic Metabolic Panel [[id number]]  (Abnormal) Collected:  10/16/19 0400    Specimen:  Blood Updated:  10/16/19 0443     Glucose 162 mg/dL      BUN 63 mg/dL      Creatinine 2.65 mg/dL      Sodium 148 mmol/L      Potassium 3.6 mmol/L      Chloride 105 mmol/L      CO2 33.0 mmol/L      Calcium 7.2 mg/dL      eGFR Non African Amer 17 mL/min/1.73      BUN/Creatinine Ratio 23.8     Anion Gap 13.6 mmol/L     Narrative:       GFR Normal >60  Chronic Kidney Disease <60  Kidney Failure <15    Magnesium [[id number]]  (Abnormal) Collected:  10/16/19 0400    Specimen:  Blood Updated:  10/16/19 0442     Magnesium 2.4 mg/dL     Lactic Acid, Plasma [[id number]]  (Normal) Collected:  10/16/19 0400    Specimen:  Blood Updated:  10/16/19 0439     Lactate 1.6 mmol/L     Vancomycin, Random [[id number]]  (Normal) Collected:  10/16/19 0400    Specimen:  Blood Updated:  10/16/19 0439     Vancomycin Random 20.60 mcg/mL     aPTT [[id number]]  (Normal) Collected:  10/16/19 0400    Specimen:  Blood Updated:  10/16/19 0425     PTT 24.7 seconds     Protime-INR [[id number]]  (Abnormal) Collected:  10/16/19 0400    Specimen:  Blood Updated:  10/16/19 0425     Protime 20.1 Seconds      INR 2.10    POC Glucose Once [[id number]]  (Abnormal) Collected:  10/15/19 2335    Specimen:  Blood Updated:  10/15/19 2336     Glucose 165 mg/dL      Comment: Serial Number: [id number]:  [id number]       POC Glucose Once [[id number]]  (Abnormal) Collected:  10/15/19 2314    Specimen:  Blood Updated:  10/15/19 2317     Glucose 180 mg/dL      Comment: Serial Number: [id number]:  [id number]       Lactic Acid, Plasma [[id number]]  (Normal) Collected:  10/15/19 2217    Specimen:  Blood Updated:  10/15/19 2243      Lactate 1.7 mmol/L     MRSA Screen Culture - Swab, Nares [[id number]]  (Normal) Collected:  10/14/19 2036    Specimen:  Swab from Nares Updated:  10/15/19 2036     MRSA SCREEN CX No Methicillin Resistant Staphylococcus aureus isolated    Lactic Acid, Reflex Timer (This will reflex a repeat order 3-3:15 hours after ordered.) [[id number]] Collected:  10/15/19 1513    Specimen:  Blood Updated:  10/15/19 1930     Extra Tube Hold for add-ons.     Comment: Auto resulted.       POC Glucose Once [[id number]]  (Abnormal) Collected:  10/15/19 1835    Specimen:  Blood Updated:  10/15/19 1836     Glucose 174 mg/dL      Comment: Serial Number: [id number]:  [id number]       Blood Culture - Blood, Blood, Venous Line [[id number]] Collected:  10/14/19 1716    Specimen:  Blood, Venous Line Updated:  10/15/19 1730     Blood Culture No growth at 24 hours    Blood Culture - Blood, Arm, Right [[id number]] Collected:  10/14/19 1647    Specimen:  Blood from Arm, Right Updated:  10/15/19 1703     Blood Culture No growth at 24 hours    Lactic Acid, Plasma [[id number]]  (Abnormal) Collected:  10/15/19 1513    Specimen:  Blood Updated:  10/15/19 1625     Lactate 3.5 mmol/L      Comment: Result checked        Basic Metabolic Panel [[id number]]  (Abnormal) Collected:  10/15/19 1121    Specimen:  Blood Updated:  10/15/19 1351     Glucose 213 mg/dL      BUN 68 mg/dL      Creatinine 3.18 mg/dL      Sodium 146 mmol/L      Potassium 4.2 mmol/L      Chloride 104 mmol/L      CO2 23.0 mmol/L      Calcium 7.3 mg/dL      eGFR   Amer --     Comment: <15 Indicative of kidney failure.        eGFR Non African Amer 14 mL/min/1.73      Comment: <15 Indicative of kidney failure.        BUN/Creatinine Ratio 21.4     Anion Gap 23.2 mmol/L     Narrative:       GFR Normal >60  Chronic Kidney Disease <60  Kidney Failure <15    POC Glucose Once [[id number]]  (Abnormal) Collected:  10/15/19 1327    Specimen:  Blood Updated:  10/15/19 1329     Glucose 239 mg/dL       Comment: Serial Number: [id number]:  [id number]       Urine Culture - Urine, Urine, Catheter [[id number]]  (Abnormal) Collected:  10/14/19 1929    Specimen:  Urine, Catheter Updated:  10/15/19 1317     Urine Culture >100,000 CFU/mL Escherichia coli    Hepatic Function Panel [[id number]]  (Abnormal) Collected:  10/15/19 1121    Specimen:  Blood Updated:  10/15/19 1232     Total Protein 5.3 g/dL      Albumin 3.00 g/dL      ALT (SGPT) 27 U/L      AST (SGOT) 78 U/L      Alkaline Phosphatase 94 U/L      Total Bilirubin 1.4 mg/dL      Bilirubin, Direct 0.9 mg/dL      Bilirubin, Indirect 0.5 mg/dL     Lactic Acid, Plasma [[id number]]  (Abnormal) Collected:  10/15/19 1121    Specimen:  Blood Updated:  10/15/19 1223     Lactate 4.2 mmol/L     Troponin [[id number]]  (Abnormal) Collected:  10/15/19 1121    Specimen:  Blood Updated:  10/15/19 1222     Troponin T 0.134 ng/mL     Narrative:       Troponin T Reference Range:  <= 0.03 ng/mL-   Negative for AMI  >0.03 ng/mL-     Abnormal for myocardial necrosis.  Clinicians would have to utilize clinical acumen, EKG, Troponin and serial changes to determine if it is an Acute Myocardial Infarction or myocardial injury due to an underlying chronic condition.     Vancomycin, Random [[id number]]  (Normal) Collected:  10/15/19 1121    Specimen:  Blood Updated:  10/15/19 1209     Vancomycin Random 16.50 mcg/mL           Imaging & Other Studies    Imaging Results (last 72 hours)     Procedure Component Value Units Date/Time    CT Abdomen Pelvis Without Contrast [[id number]] Collected:  10/14/19 1912     Updated:  10/14/19 1925    Narrative:          DATE OF EXAM:  10/14/2019 6:53 PM     PROCEDURE:  CT ABDOMEN PELVIS WO CONTRAST-     INDICATIONS:  pain; A41.9-Sepsis, unspecified organism; R65.21-Severe sepsis with  septic shock     COMPARISON:  CT scan of the abdomen and pelvis 02/16/2019     TECHNIQUE:  Routine transaxial slices were obtained through the abdomen and pelvis  without the  administration of intravenous contrast. Reconstructed  coronal and sagittal images were also obtained. Automated exposure  control and iterative construction methods were used.     FINDINGS:  There are moderate to large bilateral pleural effusions with associated  bibasilar dependent subsegmental atelectasis which are worsened from the  previous CT scan. There is a transvenous pacemaker. The liver and spleen  and adrenal glands and pancreas and kidneys appear normal. There is free  fluid deep in the pelvis unusual for a patient of this age. The patient  appears to have had a subtotal colectomy with only the rectum and distal  most aspect of the sigmoid colon remaining. There are scattered small  bowel air-fluid levels in nondistended loops. The small bowel within the  subcutaneous tissues passing from the abdominal wall to the skin does  show some redundancy however there is no significant small bowel  distention to suggest obstruction. There is mild diffuse increased soft  tissue stranding in the subcutaneous fat and mesenteric fat. There are  degenerative changes of the lumbar spine.       Impression:          1. Subtotal colectomy. There is an ileostomy in the right lower  quadrant. The subcutaneous portion of the ileal loop is slightly  redundant however there is no small bowel distention to suggest  obstruction. There are some scattered small bowel air-fluid levels in  nondistended loops which could reflect an associated mild ileus or  enteritis.  2. Moderate to large bilateral pleural effusions and dependent bibasilar  atelectasis. In addition there is a mild amount of ascites deep in the  pelvis and there is diffuse increased soft tissue stranding in the  subcutaneous fat and mesenteric fat. The constellation of these findings  does raise concern for changes of congestive heart failure.  3. Incidental note is made of a 3 cm right lateral hernia containing  mesenteric fat.     Electronically Signed By-Ulysses  Jayden On:10/14/2019 7:16 PM  This report was finalized on [id number] by  Ulysses Carpenter, .    XR Chest 1 View [[id number]] Collected:  10/14/19 1902     Updated:  10/14/19 1905    Narrative:       DATE OF EXAM:  10/14/2019 6:45 PM     PROCEDURE:  XR CHEST 1 VW-     INDICATIONS:  picc tip verification     COMPARISON: Chest x-ray 10/14/2019 at 5:00 PM     TECHNIQUE:   Single radiographic AP view of the chest was obtained.     FINDINGS:  There is been placement of a right PICC line with the tip in the  superior vena cava. There is cardiomegaly with a transvenous pacemaker.  There are extensive bilateral alveolar infiltrates and pleural effusions  with pulmonary vascular congestion which have worsened from the previous  study.        Impression:       Under  1. PICC line in good position with the tip in the superior vena cava.  2. Findings suggesting changes of pulmonary edema and congestive failure  which are worsened from the previous study.     Electronically Signed By-Ulysses Carpenter On:10/14/2019 7:03 PM  This report was finalized on [id number] by  Ulysses Carpenter, .    XR Chest 1 View [[id number]] Collected:  10/14/19 1713     Updated:  10/14/19 1716    Narrative:       DATE OF EXAM:  10/14/2019 5:00 PM     PROCEDURE:  XR CHEST 1 VW-     INDICATIONS:  soa     COMPARISON:  Chest x-ray 02/11/2019     TECHNIQUE:   Single radiographic AP view of the chest was obtained.     FINDINGS:  There is cardiomegaly with a transvenous pacemaker. There is no  significant pulmonary vascular congestion however there are extensive  bibasilar areas of infiltrate and consolidation and effusion. There is a  possible PICC line on the right with the tip in the right axillary vein.          Impression:       Renomegaly. Basilar infiltrates and effusions. The appearance is  somewhat more suggestive of changes of pulmonary edema and congestive  failure. An underlying pneumonia cannot be excluded.     Electronically Signed By-Ulysses  Jayden On:10/14/2019 5:14 PM  This report was finalized on [id number] by  Ulysses Carpenter, .          Assessment/plan  Septic shock likely related to urosepsis with hx of ESBL E. Coli UTI  Acute hypoxic respiratory failure  Leukocytosis   Bilateral pleural effusions   Hyperkalemia  LEYLA  Atrial fib with RVR  Acute hypoxic respiratory failure  H/o Hypertension  H/o LLE DVT and PE  New onset CHF  Chronic anticoagulation with Eliquis  ?Ileus  H/o ileostomy r/t C. Diff  NSTEMI    Plan:    -cont Merrem (hx of ESBL UTI).  D/C Vanco  -Precision Flow and now on 8L high flow nasal cannula, cont to wean for sats 94%  -CXR with pulmonary edema  -ABGs reviewed   -resume AMIO gtt   -currently off AREN  -resp viral panel negative   -Urine cx + Ecoli   -blood cx negative   -TTE reviewed, EF 60%  -troponin 0.43  -lactic acid 5.2 and now 1.6  -replace electrolytes as needed   -IVF bolus of 1449 in the ER  -Insulin, calcium, D50 and bicarb for hyperkalemia, K+ 4.3 given at admission  -Nephrology consulted  -OFF HCO3 gtt  -IVFs, 1/2 NS 75ml/hr per renal  -restart anticoagulation when appropriate, INR 2.1 (Eliquis home med)   -Renal ultrasound negative  -Cardiology consulted   -Diureses per renal  -passed swallow eval, diet ordered   -PT eval    PICC    PUD: Famotidine  Insulin:  Sliding scale  VTE:  SCDs  Nutrition: Diet ordered     DNR        Electronically signed by Amanda Kaba MD at 10/16/19 2051     Margarita Gallardo MD at 10/16/19 0813                                                                                                                                                Nephrology  Progress Note                                        [facility]    Patient Identification    Name: Dinora Vásquez  Age: 89 y.o.  Sex: female  :  1926  MRN: [id number]      DATE OF SERVICE:  10/16/2019        Subective    Awake on less oxygen and less pressure     Objective   Scheduled Meds:  dexamethasone 1  "drop Both Eyes Q8H   famotidine 20 mg Oral Q AM   insulin lispro 0-7 Units Subcutaneous Q6H   levothyroxine 75 mcg Oral QAM AC   meropenem 500 mg Intravenous Q24H   mirtazapine 7.5 mg Oral Nightly   polyethyl glycol-propyl glycol 1 drop Both Eyes BID   sertraline 25 mg Oral Daily   sodium chloride 10 mL Intravenous Q12H         Continuous Infusions:  Pharmacy to Dose meropenem (MERREM)     Pharmacy to dose vancomycin     phenylephrine 0.5-3 mcg/kg/min Last Rate: 1.7 mcg/kg/min (10/15/19 2117)   sodium chloride 75 mL/hr Last Rate: 75 mL/hr (10/15/19 2040)       PRN Meds:dextrose  •  dextrose  •  glucagon (human recombinant)  •  metoprolol tartrate  •  ondansetron  •  Pharmacy to Dose meropenem (MERREM)  •  Pharmacy to dose vancomycin  •  sodium chloride  •  sodium chloride  •  vancomycin     Exam:  /68   Pulse 110   Temp 98.8 °F (37.1 °C) (Oral)   Resp (!) 29   Ht 160 cm (63\")   Wt 60.9 kg (134 lb 4.2 oz)   SpO2 95%   BMI 23.78 kg/m²      Intake/Output last 3 shifts:  I/O last 3 completed shifts:  In: 6019 [I.V.:6019]  Out: 1050 [Urine:850; Stool:200]    Intake/Output this shift:  No intake/output data recorded.    Physical exam:  General Appearance:  Alert  Head:  Normocephalic, without obvious abnormality, atraumatic  Eyes:  PERRL, conjunctiva/corneas clear     Neck:  Supple,  no adenopathy;      Lungs:  Decreased BS occasion ronchi  Heart:  Regular rate and rhythm, S1 and S2 normal  Abdomen:  Soft, non-tender, bowel sounds active   Extremities: trace edema  Pulses: 2+ and symmetric all extremities  Skin:  No rashes or lesions       Data Review:  All labs (24hrs):   Recent Results (from the past 24 hour(s))   Calcium, Ionized    Collection Time: 10/15/19  8:14 AM   Result Value Ref Range    Ionized Calcium 0.96 (L) 1.20 - 1.30 mmol/L   Troponin    Collection Time: 10/15/19 11:21 AM   Result Value Ref Range    Troponin T 0.134 (C) 0.000 - 0.030 ng/mL   Lactic Acid, Plasma    Collection Time: 10/15/19 " 11:21 AM   Result Value Ref Range    Lactate 4.2 (C) 0.5 - 2.0 mmol/L   Vancomycin, Random    Collection Time: 10/15/19 11:21 AM   Result Value Ref Range    Vancomycin Random 16.50 5.00 - 40.00 mcg/mL   Hepatic Function Panel    Collection Time: 10/15/19 11:21 AM   Result Value Ref Range    Total Protein 5.3 (L) 6.0 - 8.5 g/dL    Albumin 3.00 (L) 3.50 - 5.20 g/dL    ALT (SGPT) 27 1 - 33 U/L    AST (SGOT) 78 (H) 1 - 32 U/L    Alkaline Phosphatase 94 39 - 117 U/L    Total Bilirubin 1.4 (H) 0.2 - 1.2 mg/dL    Bilirubin, Direct 0.9 (H) 0.2 - 0.3 mg/dL    Bilirubin, Indirect 0.5 mg/dL   Basic Metabolic Panel    Collection Time: 10/15/19 11:21 AM   Result Value Ref Range    Glucose 213 (H) 65 - 99 mg/dL    BUN 68 (H) 8 - 23 mg/dL    Creatinine 3.18 (H) 0.57 - 1.00 mg/dL    Sodium 146 (H) 136 - 145 mmol/L    Potassium 4.2 3.5 - 5.2 mmol/L    Chloride 104 98 - 107 mmol/L    CO2 23.0 22.0 - 29.0 mmol/L    Calcium 7.3 (L) 8.2 - 9.6 mg/dL    eGFR  African Amer      eGFR Non African Amer 14 (L) >60 mL/min/1.73    BUN/Creatinine Ratio 21.4 7.0 - 25.0    Anion Gap 23.2 (H) 5.0 - 15.0 mmol/L   POC Glucose Once    Collection Time: 10/15/19  1:27 PM   Result Value Ref Range    Glucose 239 (H) 70 - 105 mg/dL   Lactic Acid, Plasma    Collection Time: 10/15/19  3:13 PM   Result Value Ref Range    Lactate 3.5 (C) 0.5 - 2.0 mmol/L   Lactic Acid, Reflex Timer (This will reflex a repeat order 3-3:15 hours after ordered.)    Collection Time: 10/15/19  3:13 PM   Result Value Ref Range    Extra Tube Hold for add-ons.    POC Glucose Once    Collection Time: 10/15/19  6:35 PM   Result Value Ref Range    Glucose 174 (H) 70 - 105 mg/dL   Lactic Acid, Plasma    Collection Time: 10/15/19 10:17 PM   Result Value Ref Range    Lactate 1.7 0.5 - 2.0 mmol/L   POC Glucose Once    Collection Time: 10/15/19 11:14 PM   Result Value Ref Range    Glucose 180 (H) 70 - 105 mg/dL   POC Glucose Once    Collection Time: 10/15/19 11:35 PM   Result Value Ref Range     Glucose 165 (H) 70 - 105 mg/dL   Lactic Acid, Plasma    Collection Time: 10/16/19  4:00 AM   Result Value Ref Range    Lactate 1.6 0.5 - 2.0 mmol/L   Basic Metabolic Panel    Collection Time: 10/16/19  4:00 AM   Result Value Ref Range    Glucose 162 (H) 65 - 99 mg/dL    BUN 63 (H) 8 - 23 mg/dL    Creatinine 2.65 (H) 0.57 - 1.00 mg/dL    Sodium 148 (H) 136 - 145 mmol/L    Potassium 3.6 3.5 - 5.2 mmol/L    Chloride 105 98 - 107 mmol/L    CO2 33.0 (H) 22.0 - 29.0 mmol/L    Calcium 7.2 (L) 8.2 - 9.6 mg/dL    eGFR Non African Amer 17 (L) >60 mL/min/1.73    BUN/Creatinine Ratio 23.8 7.0 - 25.0    Anion Gap 13.6 5.0 - 15.0 mmol/L   Magnesium    Collection Time: 10/16/19  4:00 AM   Result Value Ref Range    Magnesium 2.4 (H) 1.7 - 2.3 mg/dL   Phosphorus    Collection Time: 10/16/19  4:00 AM   Result Value Ref Range    Phosphorus 3.1 2.5 - 4.5 mg/dL   aPTT    Collection Time: 10/16/19  4:00 AM   Result Value Ref Range    PTT 24.7 24.0 - 31.0 seconds   Protime-INR    Collection Time: 10/16/19  4:00 AM   Result Value Ref Range    Protime 20.1 (H) 9.6 - 11.7 Seconds    INR 2.10 (C) 0.90 - 1.10   Vancomycin, Random    Collection Time: 10/16/19  4:00 AM   Result Value Ref Range    Vancomycin Random 20.60 5.00 - 40.00 mcg/mL   CBC Auto Differential    Collection Time: 10/16/19  4:00 AM   Result Value Ref Range    WBC 20.50 (H) 3.40 - 10.80 10*3/mm3    RBC 3.59 (L) 3.77 - 5.28 10*6/mm3    Hemoglobin 11.0 (L) 12.0 - 15.9 g/dL    Hematocrit 34.2 34.0 - 46.6 %    MCV 95.2 79.0 - 97.0 fL    MCH 30.7 26.6 - 33.0 pg    MCHC 32.3 31.5 - 35.7 g/dL    RDW 18.0 (H) 12.3 - 15.4 %    RDW-SD 60.4 (H) 37.0 - 54.0 fl    MPV 8.6 6.0 - 12.0 fL    Platelets 160 140 - 450 10*3/mm3   Scan Slide    Collection Time: 10/16/19  4:00 AM   Result Value Ref Range    Scan Slide     Manual Differential    Collection Time: 10/16/19  4:00 AM   Result Value Ref Range    Neutrophil % 86.0 (H) 42.7 - 76.0 %    Lymphocyte % 2.0 (L) 19.6 - 45.3 %    Monocyte % 7.0  5.0 - 12.0 %    Bands %  5.0 0.0 - 5.0 %    Neutrophils Absolute 18.66 (H) 1.70 - 7.00 10*3/mm3    Lymphocytes Absolute 0.41 (L) 0.70 - 3.10 10*3/mm3    Monocytes Absolute 1.44 (H) 0.10 - 0.90 10*3/mm3    nRBC 2.0 (H) 0.0 - 0.2 /100 WBC    Polychromasia Slight/1+ None Seen    Toxic Granulation Slight/1+ None Seen    Vacuolated Neutrophils Slight/1+ None Seen    Platelet Morphology Normal Normal   POC Glucose Once    Collection Time: 10/16/19  5:51 AM   Result Value Ref Range    Glucose 130 (H) 70 - 105 mg/dL          Imaging:  [unfilled]    Assessment/Plan:     Septic shock (CMS/HCC)    Atrial fibrillation with RVR (CMS/HCC)    Acute on chronic renal failure (CMS/HCC)    Elevated brain natriuretic peptide (BNP) level    Elevated troponin     Acute kidney injury on chronic kidney disease stage III baseline creatinine 1.4              - Creatinine trending down    -Half-normal saline  Hyponatremia on half-normal saline  Hypocalcemia replace  Acute hypoxic respiratory failure  Septic shock  Urosepsis  Leukocytosis  Pleural effusions  Status post hyperkalemia  Atrial fibrillation with rapid ventricular response  History of hypertension  History of lower extremity DVT and PE  New onset congestive heart failure  Abdominal pain with possible ileus  Non-ST elevation MI     Kidney function better  Oxygenation better  Blood pressure is better      Electronically signed by Margarita Gallardo MD at 10/16/19 0815       Consult Notes (last 24 hours) (Notes from 10/16/19 0736 through 10/17/19 0736)     No notes of this type exist for this encounter.

## 2019-10-17 NOTE — CONSULTS
[facility]      Primary Care Provider:  Florentino Kimbrough MD    Patient Care Team:  Florentino Kimbrough MD as PCP - General (Family Medicine)  Gosia Hodgson MD as PCP - Family Medicine    CHIEF COMPLAINT: shortness of breath     Chief Complaint   Patient presents with   • Abnormal Lab     HISTORY OF PRESENT ILLNESS:    HPI    Per intensivist's note and edited accordingly:     Dinora Vásquez is a 89 y.o. female who was presented to the ER after being seen earlier by Nephrology and found to be hypotensive and tachycardic; in addition she complained of dyspnea, abdominal pain and not feeling well.  She had a bolus of IVFs started and transferred to the ER.  Workup in the ER revealed afib with RVR, UTI, septic shock, CHF, acute hypoxic respiratory failure and LEYLA.  She had IVFs per sepsis protocol given and started on Meropenem.  She was given a bolus of amio and Cardizem without improvement in her heart rate and subsequently developed hypotension.  She had a PICC ordered by the ER and was started on Levophed.   The patient is currently difficult to get a history from as she is obtunded.  Her sats were in the 70's and she was started on Precision Flow with improvement.       10/15/19: Short of breath with minimal activity.  Remains on Precision flow supplemental oxygen.  No chest pains.  No nausea or vomiting  10/16/19:  Improved today on 8L high flow nasal cannula.  HR remains 120s.  No abdominal pain.  No CP.  10/17/19:  No issues overnight.  Off vasopressor.  The hospitalists were consulted for medical management.  The patient states she is doing well.  She reports mild shortness of breath.  She denies chest pain, nausea, vomiting, abdominal pain.      Past Medical History:   Diagnosis Date   • Ankle wound, right, initial encounter    • Aortic stenosis    • Atrial fibrillation (CMS/HCC)    • Coronary artery disease    • DVT (deep venous thrombosis) (CMS/HCC)    •  Hypothyroidism    • Left bundle branch block    • Pulmonary embolus (CMS/HCC)    • Pulmonary hypertension (CMS/HCC)    • Sinus bradycardia        Past Surgical History:   Procedure Laterality Date   • COLOSTOMY     • ORIF ANKLE FRACTURE Right 2013    Right Ankle ORIF    • PACEMAKER IMPLANTATION  11/29/2017    Dual Chamber [employer]   • TOTAL ABDOMINAL HYSTERECTOMY         Family History   Problem Relation Age of Onset   • Heart disease Mother    • Diabetes Mother    • Heart disease Sister    • Diabetes Sister        Social History     Tobacco Use   • Smoking status: Never Smoker   • Smokeless tobacco: Never Used   Substance Use Topics   • Alcohol use: No     Frequency: Never   • Drug use: No       Medications Prior to Admission   Medication Sig Dispense Refill Last Dose   • cholecalciferol (VITAMIN D3) 1000 units tablet Daily.      • Cranberry 600 MG tablet Take 600 mg by mouth 3 (Three) Times a Day.      • ELIQUIS 2.5 MG tablet tablet Take 2.5 mg by mouth 2 (Two) Times a Day.      • fluorometholone (FML) 0.1 % ophthalmic suspension Administer 1 Drop/kg to both eyes Daily.      • levothyroxine (SYNTHROID, LEVOTHROID) 75 MCG tablet Every Morning Before Breakfast.      • loratadine (CLARITIN) 10 MG tablet Take 10 mg by mouth Daily.      • Magnesium 400 MG tablet MAGNESIUM 400 MG TABS      • metoprolol tartrate (LOPRESSOR) 50 MG tablet Take 50 mg by mouth 2 (Two) Times a Day.      • mirtazapine (REMERON) 7.5 MG tablet 7.5 mg Daily.      • polyvinyl alcohol (ARTIFICIAL TEARS) 1.4 % ophthalmic solution Every 12 (Twelve) Hours.      • Prenatal w/o A Vit-Fe Fum-FA (BP MULTINATAL PLUS) 30-1 MG tablet Daily.      • saccharomyces boulardii (FLORASTOR) 250 MG capsule FLORASTOR 250 MG CAPS      • sertraline (ZOLOFT) 25 MG tablet Take 25 mg by mouth Daily.          Allergies:  Cephalexin; Sulfadiazine; Trimethoprim; and Trospium      There is no immunization history on file for this patient.        REVIEW OF SYSTEMS:  "    Review of Systems   Cardiovascular: Negative for chest pain and palpitations.   Respiratory: Positive for shortness of breath.    Gastrointestinal: Negative for abdominal pain, nausea and vomiting.   Genitourinary: Negative for dysuria.   All other systems reviewed and are negative.      Vital Signs  Temp:  [97.5 °F (36.4 °C)-98.8 °F (37.1 °C)] 98.8 °F (37.1 °C)  Heart Rate:  [] 104  Resp:  [15-18] 18  BP: (105-165)/() 165/93    Flowsheet Rows      First Filed Value   Admission Height  160 cm (63\") Documented at 10/14/2019 1551   Admission Weight  48.3 kg (106 lb 7.7 oz) Documented at 10/14/2019 1551           Physical Exam:    Physical Exam   Constitutional: She is oriented to person, place, and time. She appears well-developed and well-nourished. No distress.   HENT:   Head: Normocephalic and atraumatic.   Eyes: EOM are normal. Pupils are equal, round, and reactive to light.   Neck: Normal range of motion.   Cardiovascular: Normal rate and normal heart sounds. Exam reveals no gallop and no friction rub.   No murmur heard.  IRR   Pulmonary/Chest: Effort normal and breath sounds normal. No stridor. No respiratory distress. She has no wheezes.   Abdominal: Soft. Bowel sounds are normal. She exhibits no distension. There is no tenderness. There is no guarding.   Musculoskeletal: Normal range of motion. She exhibits no edema or deformity.   Neurological: She is alert and oriented to person, place, and time.   Skin: Skin is warm and dry. She is not diaphoretic.   Psychiatric: She has a normal mood and affect.   Vitals reviewed.    Results Review:      I reviewed the patient's new clinical results.    Lab Results (most recent)     Procedure Component Value Units Date/Time    POC Glucose Once [[id number]]  (Abnormal) Collected:  10/17/19 1135    Specimen:  Blood Updated:  10/17/19 1142     Glucose 149 mg/dL      Comment: Serial Number: [id number]:  [id number]       POC Glucose Once [[id number]]  " (Abnormal) Collected:  10/17/19 0732    Specimen:  Blood Updated:  10/17/19 0736     Glucose 128 mg/dL      Comment: Serial Number: [id number]:  [id number]       Blood Culture - Blood, Arm, Right [[id number]] Collected:  10/14/19 1647    Specimen:  Blood from Arm, Right Updated:  10/17/19 0608     Blood Culture Growth present, too young to evaluate     Gram Stain Aerobic Bottle Gram positive bacilli resembling diphtheroids    Narrative:       Blood culture does not meet the specified criteria for PCR identification.  If pregnant, immunocompromised, or clinical concern for meningitis, call lab to run BCID for Listeria monocytogenes.    Basic Metabolic Panel [[id number]]  (Abnormal) Collected:  10/17/19 0405    Specimen:  Blood Updated:  10/17/19 0441     Glucose 146 mg/dL      BUN 48 mg/dL      Creatinine 2.05 mg/dL      Sodium 142 mmol/L      Potassium 3.8 mmol/L      Chloride 100 mmol/L      CO2 31.0 mmol/L      Calcium 7.5 mg/dL      eGFR Non African Amer 23 mL/min/1.73      BUN/Creatinine Ratio 23.4     Anion Gap 14.8 mmol/L     Narrative:       GFR Normal >60  Chronic Kidney Disease <60  Kidney Failure <15    Magnesium [[id number]]  (Normal) Collected:  10/17/19 0405    Specimen:  Blood Updated:  10/17/19 0441     Magnesium 2.0 mg/dL     Phosphorus [[id number]]  (Normal) Collected:  10/17/19 0405    Specimen:  Blood Updated:  10/17/19 0441     Phosphorus 2.8 mg/dL     Lactic Acid, Plasma [[id number]]  (Normal) Collected:  10/17/19 0405    Specimen:  Blood Updated:  10/17/19 0436     Lactate 1.6 mmol/L     CBC & Differential [[id number]] Collected:  10/17/19 0405    Specimen:  Blood Updated:  10/17/19 0416    Narrative:       The following orders were created for panel order CBC & Differential.  Procedure                               Abnormality         Status                     ---------                               -----------         ------                     CBC Auto Differential[[id number]]         Abnormal            Final result                 Please view results for these tests on the individual orders.    CBC Auto Differential [[id number]]  (Abnormal) Collected:  10/17/19 0405    Specimen:  Blood Updated:  10/17/19 0416     WBC 13.10 10*3/mm3      RBC 3.57 10*6/mm3      Hemoglobin 11.0 g/dL      Hematocrit 34.7 %      MCV 97.0 fL      MCH 30.9 pg      MCHC 31.8 g/dL      RDW 18.2 %      RDW-SD 62.1 fl      MPV 7.8 fL      Platelets 116 10*3/mm3      Neutrophil % 85.2 %      Lymphocyte % 6.4 %      Monocyte % 7.6 %      Eosinophil % 0.5 %      Basophil % 0.3 %      Neutrophils, Absolute 11.20 10*3/mm3      Lymphocytes, Absolute 0.80 10*3/mm3      Monocytes, Absolute 1.00 10*3/mm3      Eosinophils, Absolute 0.10 10*3/mm3      Basophils, Absolute 0.00 10*3/mm3      nRBC 0.3 /100 WBC     Blood Culture - Blood, Blood, Venous Line [[id number]] Collected:  10/14/19 1716    Specimen:  Blood, Venous Line Updated:  10/16/19 1730     Blood Culture No growth at 2 days    Urine Culture - Urine, Urine, Catheter [[id number]]  (Abnormal)  (Susceptibility) Collected:  10/14/19 1929    Specimen:  Urine, Catheter Updated:  10/16/19 1248     Urine Culture >100,000 CFU/mL Escherichia coli ESBL     Comment:   Consider infectious disease consult.  Susceptibility results may not correlate to clinical outcomes.       Susceptibility      Escherichia coli ESBL     WESLEY     Gentamicin Resistant     Levofloxacin Resistant     Meropenem Susceptible     Nitrofurantoin Susceptible     Piperacillin + Tazobactam Susceptible     Tetracycline Resistant     Trimethoprim + Sulfamethoxazole Resistant                    Lactic Acid, Plasma [[id number]]  (Normal) Collected:  10/16/19 0815    Specimen:  Blood Updated:  10/16/19 0916     Lactate 1.5 mmol/L     CBC & Differential [[id number]] Collected:  10/16/19 0400    Specimen:  Blood Updated:  10/16/19 0605    Narrative:       The following orders were created for panel order CBC &  Differential.  Procedure                               Abnormality         Status                     ---------                               -----------         ------                     CBC Auto Differential[[id number]]        Abnormal            Final result                 Please view results for these tests on the individual orders.    CBC Auto Differential [[id number]]  (Abnormal) Collected:  10/16/19 0400    Specimen:  Blood Updated:  10/16/19 0605     WBC 20.50 10*3/mm3      RBC 3.59 10*6/mm3      Hemoglobin 11.0 g/dL      Hematocrit 34.2 %      MCV 95.2 fL      MCH 30.7 pg      MCHC 32.3 g/dL      RDW 18.0 %      RDW-SD 60.4 fl      MPV 8.6 fL      Platelets 160 10*3/mm3     Scan Slide [[id number]] Collected:  10/16/19 0400    Specimen:  Blood Updated:  10/16/19 0605     Scan Slide --     Comment: See Manual Differential Results       Manual Differential [[id number]]  (Abnormal) Collected:  10/16/19 0400    Specimen:  Blood Updated:  10/16/19 0605     Neutrophil % 86.0 %      Lymphocyte % 2.0 %      Monocyte % 7.0 %      Bands %  5.0 %      Neutrophils Absolute 18.66 10*3/mm3      Lymphocytes Absolute 0.41 10*3/mm3      Monocytes Absolute 1.44 10*3/mm3      nRBC 2.0 /100 WBC      Polychromasia Slight/1+     Toxic Granulation Slight/1+     Vacuolated Neutrophils Slight/1+     Platelet Morphology Normal    Phosphorus [[id number]]  (Normal) Collected:  10/16/19 0400    Specimen:  Blood Updated:  10/16/19 0443     Phosphorus 3.1 mg/dL     Basic Metabolic Panel [[id number]]  (Abnormal) Collected:  10/16/19 0400    Specimen:  Blood Updated:  10/16/19 0443     Glucose 162 mg/dL      BUN 63 mg/dL      Creatinine 2.65 mg/dL      Sodium 148 mmol/L      Potassium 3.6 mmol/L      Chloride 105 mmol/L      CO2 33.0 mmol/L      Calcium 7.2 mg/dL      eGFR Non African Amer 17 mL/min/1.73      BUN/Creatinine Ratio 23.8     Anion Gap 13.6 mmol/L     Narrative:       GFR Normal >60  Chronic Kidney Disease <60  Kidney Failure <15     Magnesium [[id number]]  (Abnormal) Collected:  10/16/19 0400    Specimen:  Blood Updated:  10/16/19 0442     Magnesium 2.4 mg/dL     Vancomycin, Random [[id number]]  (Normal) Collected:  10/16/19 0400    Specimen:  Blood Updated:  10/16/19 0439     Vancomycin Random 20.60 mcg/mL     aPTT [[id number]]  (Normal) Collected:  10/16/19 0400    Specimen:  Blood Updated:  10/16/19 0425     PTT 24.7 seconds     Protime-INR [[id number]]  (Abnormal) Collected:  10/16/19 0400    Specimen:  Blood Updated:  10/16/19 0425     Protime 20.1 Seconds      INR 2.10    MRSA Screen Culture - Swab, Nares [[id number]]  (Normal) Collected:  10/14/19 2036    Specimen:  Swab from Nares Updated:  10/15/19 2036     MRSA SCREEN CX No Methicillin Resistant Staphylococcus aureus isolated    Lactic Acid, Reflex Timer (This will reflex a repeat order 3-3:15 hours after ordered.) [[id number]] Collected:  10/15/19 1513    Specimen:  Blood Updated:  10/15/19 1930     Extra Tube Hold for add-ons.     Comment: Auto resulted.       Hepatic Function Panel [[id number]]  (Abnormal) Collected:  10/15/19 1121    Specimen:  Blood Updated:  10/15/19 1232     Total Protein 5.3 g/dL      Albumin 3.00 g/dL      ALT (SGPT) 27 U/L      AST (SGOT) 78 U/L      Alkaline Phosphatase 94 U/L      Total Bilirubin 1.4 mg/dL      Bilirubin, Direct 0.9 mg/dL      Bilirubin, Indirect 0.5 mg/dL     Troponin [[id number]]  (Abnormal) Collected:  10/15/19 1121    Specimen:  Blood Updated:  10/15/19 1222     Troponin T 0.134 ng/mL     Narrative:       Troponin T Reference Range:  <= 0.03 ng/mL-   Negative for AMI  >0.03 ng/mL-     Abnormal for myocardial necrosis.  Clinicians would have to utilize clinical acumen, EKG, Troponin and serial changes to determine if it is an Acute Myocardial Infarction or myocardial injury due to an underlying chronic condition.     Vancomycin, Random [[id number]]  (Normal) Collected:  10/15/19 1121    Specimen:  Blood Updated:  10/15/19 1209     Vancomycin  Random 16.50 mcg/mL     S. Pneumo Ag Urine or CSF - Urine, Urine, Catheter [[id number]]  (Normal) Collected:  10/14/19 1929    Specimen:  Urine, Catheter Updated:  10/15/19 0831     Strep Pneumo Ag Negative    Legionella Antigen, Urine - Urine, Urine, Catheter [[id number]]  (Normal) Collected:  10/14/19 1929    Specimen:  Urine, Catheter Updated:  10/15/19 0830     LEGIONELLA ANTIGEN, URINE Negative    Calcium, Ionized [[id number]]  (Abnormal) Collected:  10/15/19 0814    Specimen:  Blood Updated:  10/15/19 0830     Ionized Calcium 0.96 mmol/L     [place] Draw [[id number]] Collected:  10/14/19 1619    Specimen:  Blood Updated:  10/15/19 0729    Narrative:       The following orders were created for panel order [place] Draw.  Procedure                               Abnormality         Status                     ---------                               -----------         ------                     Light Blue Top[[id number]]                                   Final result               Green Top (Gel)[[id number]]                                  Final result               Lavender Top[[id number]]                                                                Gold Top - SST[[id number]]                                   Final result                 Please view results for these tests on the individual orders.    Protime-INR [[id number]]  (Abnormal) Collected:  10/15/19 0423    Specimen:  Blood Updated:  10/15/19 0604     Protime 29.6 Seconds      INR 3.14    Scan Slide [[id number]] Collected:  10/15/19 0424    Specimen:  Blood Updated:  10/15/19 0558     Scan Slide --     Comment: See Manual Differential Results       Manual Differential [[id number]]  (Abnormal) Collected:  10/15/19 0424    Specimen:  Blood Updated:  10/15/19 0558     Neutrophil % 84.0 %      Lymphocyte % 4.0 %      Monocyte % 6.0 %      Bands %  6.0 %      Neutrophils Absolute 22.41 10*3/mm3      Lymphocytes Absolute 1.00 10*3/mm3      Monocytes Absolute 1.49  10*3/mm3      nRBC 2.0 /100 WBC      [place] Cells Mod/2+     Polychromasia Slight/1+     Toxic Granulation Mod/2+     Vacuolated Neutrophils Slight/1+     Platelet Morphology Normal    Troponin [[id number]]  (Abnormal) Collected:  10/15/19 0424    Specimen:  Blood Updated:  10/15/19 0526     Troponin I 0.430 ng/mL      Comment: Result checked        Narrative:       Troponin I Reference Range:    0.00-0.03  Negative.  Repeat testing in 4-6 hours if clinically indicated.    0.04-0.29  Suspicious for myocardial injury. Serial measurements and clinical  correlation may be necessary to confirm or exclude diagnosis of acute  coronary syndrome.  Repeat testing in 4-6 hours if indicated.     >0.29 Consistent with myocardial injury.  Recommend clinical and laboratory correlation.     Results my be falsely decreased if patient taking Biotin.     Blood Gas, Arterial [[id number]]  (Abnormal) Collected:  10/15/19 0426    Specimen:  Arterial Blood Updated:  10/15/19 0428     Site Left Brachial     John's Test N/A     pH, Arterial 7.383 pH units      pCO2, Arterial 32.0 mm Hg      pO2, Arterial 93.3 mm Hg      HCO3, Arterial 19.1 mmol/L      Base Excess, Arterial -5.1 mmol/L      Comment: Serial Number: [id number]:  [id number]        O2 Saturation, Arterial 97.2 %      CO2 Content 20.1 mmol/L      Barometric Pressure for Blood Gas --     Comment: N/A        Modality Vapotherm     FIO2 60 %      Hemodilution No    CBC (No Diff) [[id number]]  (Abnormal) Collected:  10/15/19 0034    Specimen:  Blood Updated:  10/15/19 0048     WBC 21.70 10*3/mm3      RBC 3.55 10*6/mm3      Hemoglobin 11.0 g/dL      Comment: Result checked         Hematocrit 35.1 %      MCV 98.9 fL      MCH 30.9 pg      MCHC 31.2 g/dL      RDW 17.5 %      RDW-SD 60.8 fl      MPV 8.7 fL      Platelets 187 10*3/mm3     Blood Gas, Arterial [[id number]]  (Abnormal) Collected:  10/14/19 2358    Specimen:  Arterial Blood Updated:  10/15/19 0006     Site Left Brachial     John's  Test N/A     pH, Arterial 7.151 pH units      pCO2, Arterial 22.2 mm Hg      pO2, Arterial 91.2 mm Hg      HCO3, Arterial 7.7 mmol/L      Base Excess, Arterial -19.3 mmol/L      Comment: Serial Number: [id number]:  [id number]        O2 Saturation, Arterial 94.4 %      CO2 Content 8.4 mmol/L      Barometric Pressure for Blood Gas --     Comment: N/A        Modality Vapotherm     FIO2 60 %      Hemodilution No    Respiratory Panel, PCR - Swab, Nasopharynx [[id number]]  (Normal) Collected:  10/14/19 1951    Specimen:  Swab from Nasopharynx Updated:  10/14/19 2236     ADENOVIRUS, PCR Not Detected     Coronavirus 229E Not Detected     Coronavirus HKU1 Not Detected     Coronavirus NL63 Not Detected     Coronavirus OC43 Not Detected     Human Metapneumovirus Not Detected     Human Rhinovirus/Enterovirus Not Detected     Influenza B PCR Not Detected     Parainfluenza Virus 1 Not Detected     Parainfluenza Virus 2 Not Detected     Parainfluenza Virus 3 Not Detected     Parainfluenza Virus 4 Not Detected     Bordetella pertussis pcr Not Detected     Influenza A H1 2009 PCR Not Detected     Chlamydophila pneumoniae PCR Not Detected     Mycoplasma pneumo by PCR Not Detected     Influenza A PCR Not Detected     Influenza A H3 Not Detected     Influenza A H1 Not Detected     RSV, PCR Not Detected    Comprehensive Metabolic Panel [[id number]]  (Abnormal) Collected:  10/14/19 1647    Specimen:  Blood Updated:  10/14/19 2105     Glucose 142 mg/dL      BUN 71 mg/dL      Creatinine 3.80 mg/dL      Sodium 144 mmol/L      Potassium 5.2 mmol/L      Chloride 102 mmol/L      CO2 14.0 mmol/L      Calcium 8.2 mg/dL      Total Protein 6.2 g/dL      Albumin 3.50 g/dL      ALT (SGPT) <5 U/L      AST (SGOT) 51 U/L      Alkaline Phosphatase 97 U/L      Total Bilirubin 2.3 mg/dL      eGFR Non African Amer 11 mL/min/1.73      Comment: <15 Indicative of kidney failure.        eGFR   Amer --     Comment: <15 Indicative of kidney failure.         Globulin 2.7 gm/dL      A/G Ratio 1.3 g/dL      BUN/Creatinine Ratio 18.7     Anion Gap 33.2 mmol/L     Narrative:       The MDRD GFR formula is only valid for adults with stable renal function between ages 18 and 70.    Comprehensive Metabolic Panel [[id number]]  (Abnormal) Collected:  10/14/19 1619    Specimen:  Blood Updated:  10/14/19 2017     Glucose 152 mg/dL      BUN 71 mg/dL      Creatinine 4.00 mg/dL      Sodium 142 mmol/L      Potassium 5.2 mmol/L      Chloride 103 mmol/L      CO2 12.0 mmol/L      Calcium 8.0 mg/dL      Total Protein 6.4 g/dL      Albumin 3.50 g/dL      ALT (SGPT) <5 U/L      AST (SGOT) 51 U/L      Alkaline Phosphatase 97 U/L      Total Bilirubin 2.1 mg/dL      eGFR Non African Amer 10 mL/min/1.73      Comment: <15 Indicative of kidney failure.        eGFR   Amer --     Comment: <15 Indicative of kidney failure.        Globulin 2.9 gm/dL      A/G Ratio 1.2 g/dL      BUN/Creatinine Ratio 17.8     Anion Gap 32.2 mmol/L     Narrative:       The MDRD GFR formula is only valid for adults with stable renal function between ages 18 and 70.    Urinalysis With Culture If Indicated - Urine, Catheter [[id number]]  (Abnormal) Collected:  10/14/19 1929    Specimen:  Urine, Catheter Updated:  10/14/19 2015     Color, UA Dark Yellow     Appearance, UA Turbid     Comment: Result checked         pH, UA 5.5     Specific Gravity, UA 1.016     Glucose, UA Negative     Ketones, UA Trace     Bilirubin, UA Small (1+)     Comment: Confirmation testing is unavailable.  A serum bilirubin is recommended for further assessment.        Blood, UA Moderate (2+)     Protein, UA 30 mg/dL (1+)     Leuk Esterase, UA Large (3+)     Nitrite, UA Negative     Urobilinogen, UA 0.2 E.U./dL    Urinalysis, Microscopic Only - Urine, Catheter [[id number]]  (Abnormal) Collected:  10/14/19 1929    Specimen:  Urine, Catheter Updated:  10/14/19 2015     RBC, UA 3-5 /HPF      WBC, UA Too Numerous to Count /HPF      Bacteria, UA 3+  /HPF      Squamous Epithelial Cells, UA 3-6 /HPF      Hyaline Casts, UA 3-6 /LPF      Granular Casts, UA 0-2 /LPF      Methodology Manual Light Microscopy    POC Lactate [[id number]]  (Abnormal) Collected:  10/14/19 1910    Specimen:  Blood Updated:  10/14/19 1912     Lactate 9.6 mmol/L      Comment: Serial Number: [id number]:  [id number]       C-reactive Protein [[id number]]  (Abnormal) Collected:  10/14/19 1619    Specimen:  Blood Updated:  10/14/19 1857     C-Reactive Protein 11.81 mg/dL     Calcium, Ionized [[id number]]  (Abnormal) Collected:  10/14/19 1647    Specimen:  Blood Updated:  10/14/19 1808     Ionized Calcium 1.02 mmol/L     Light Blue Top [[id number]] Collected:  10/14/19 1619    Specimen:  Blood Updated:  10/14/19 1800     Extra Tube hold for add-on     Comment: Auto resulted       Green Top (Gel) [[id number]] Collected:  10/14/19 1619    Specimen:  Blood Updated:  10/14/19 1800     Extra Tube Hold for add-ons.     Comment: Auto resulted.       Gold Top - SST [[id number]] Collected:  10/14/19 1647    Specimen:  Blood Updated:  10/14/19 1800     Extra Tube Hold for add-ons.     Comment: Auto resulted.       aPTT [[id number]]  (Normal) Collected:  10/14/19 1619    Specimen:  Blood Updated:  10/14/19 1723     PTT 26.2 seconds     BNP [[id number]]  (Abnormal) Collected:  10/14/19 1619    Specimen:  Blood Updated:  10/14/19 1722     BNP 1,784.0 pg/mL      Comment: Results may be falsely decreased if patient taking Biotin.       POC Lactate [[id number]]  (Abnormal) Collected:  10/14/19 1654    Specimen:  Blood Updated:  10/14/19 1657     Lactate 9.2 mmol/L      Comment: Serial Number: [id number]:  [id number]             Imaging Results (most recent)     Procedure Component Value Units Date/Time    XR Abdomen KUB [[id number]] Collected:  10/15/19 0835     Updated:  10/15/19 0839    Narrative:       DATE OF EXAM:  10/15/2019 4:26 AM     PROCEDURE:  XR ABDOMEN KUB-     INDICATIONS:  NG placement;  A41.9-Sepsis, unspecified organism; R65.21-Severe sepsis  with septic shock; I48.0-Paroxysmal atrial fibrillation; I50.9-Heart  failure, unspecified; I21.4-Non-ST elevation (NSTEMI) myocardial  infarction; N17.0-Acute kidney failure with tubular necrosis     COMPARISON:  CT abdomen and pelvis 10/14/2019.     TECHNIQUE:   Radiographic view(s) of the abdomen obtained.     FINDINGS:  Tip of the enteric tube terminates in the left mid abdomen, likely in  the gastric body. There is a nonspecific paucity of gas-filled small  bowel loops in the included upper abdomen. The pelvis was not imaged.  Bilateral pleural effusions with underlying atelectasis are included.        Impression:       1.Tip of the enteric tube terminates in the left mid abdomen, likely in  the gastric body.  2.Nonspecific upper abdominal bowel gas pattern.  3.Moderate bilateral pleural effusions with underlying atelectasis.     Electronically Signed By-Gloria Heath On:10/15/2019 8:37 AM  This report was finalized on [id number] by  Gloria Heath, .    US Renal Limited [[id number]] Collected:  10/15/19 0322     Updated:  10/15/19 0524    Narrative:       EXAMINATION: Complete retroperitoneal ultrasound examination.    INDICATIONS: Acute kidney injury.    TECHNIQUE: Ultrasound of the kidneys and urinary bladder was performed.    COMPARISON: CT examination from 2/16/2019    FINDINGS:    The right kidney has normal contour and sonographic appearance without hydronephrosis, measuring 8.1 x 4.4 x 5.3 cm. Renal parenchymal echotexture is normal.    The left kidney has normal contour and sonographic appearance without hydronephrosis, measuring 8.5 x 4.6 x 5.3 cm. Renal parenchymal echotexture is normal.    The urinary bladder is partially distended and has an unremarkable sonographic appearance.      Impression:       Unremarkable retroperitoneal ultrasound examination. No evidence for hydronephrosis.      Electronically signed by:  Jw Weiner M.D.     10/15/2019 3:23 AM    CT Abdomen Pelvis Without Contrast [[id number]] Collected:  10/14/19 1912     Updated:  10/14/19 1925    Narrative:          DATE OF EXAM:  10/14/2019 6:53 PM     PROCEDURE:  CT ABDOMEN PELVIS WO CONTRAST-     INDICATIONS:  pain; A41.9-Sepsis, unspecified organism; R65.21-Severe sepsis with  septic shock     COMPARISON:  CT scan of the abdomen and pelvis 02/16/2019     TECHNIQUE:  Routine transaxial slices were obtained through the abdomen and pelvis  without the administration of intravenous contrast. Reconstructed  coronal and sagittal images were also obtained. Automated exposure  control and iterative construction methods were used.     FINDINGS:  There are moderate to large bilateral pleural effusions with associated  bibasilar dependent subsegmental atelectasis which are worsened from the  previous CT scan. There is a transvenous pacemaker. The liver and spleen  and adrenal glands and pancreas and kidneys appear normal. There is free  fluid deep in the pelvis unusual for a patient of this age. The patient  appears to have had a subtotal colectomy with only the rectum and distal  most aspect of the sigmoid colon remaining. There are scattered small  bowel air-fluid levels in nondistended loops. The small bowel within the  subcutaneous tissues passing from the abdominal wall to the skin does  show some redundancy however there is no significant small bowel  distention to suggest obstruction. There is mild diffuse increased soft  tissue stranding in the subcutaneous fat and mesenteric fat. There are  degenerative changes of the lumbar spine.       Impression:          1. Subtotal colectomy. There is an ileostomy in the right lower  quadrant. The subcutaneous portion of the ileal loop is slightly  redundant however there is no small bowel distention to suggest  obstruction. There are some scattered small bowel air-fluid levels in  nondistended loops which could reflect an associated mild  ileus or  enteritis.  2. Moderate to large bilateral pleural effusions and dependent bibasilar  atelectasis. In addition there is a mild amount of ascites deep in the  pelvis and there is diffuse increased soft tissue stranding in the  subcutaneous fat and mesenteric fat. The constellation of these findings  does raise concern for changes of congestive heart failure.  3. Incidental note is made of a 3 cm right lateral hernia containing  mesenteric fat.     Electronically Signed ByIsrael Carpenter On:10/14/2019 7:16 PM  This report was finalized on [id number] by  Ulysses Carpenter, .    XR Chest 1 View [[id number]] Collected:  10/14/19 1902     Updated:  10/14/19 1905    Narrative:       DATE OF EXAM:  10/14/2019 6:45 PM     PROCEDURE:  XR CHEST 1 VW-     INDICATIONS:  picc tip verification     COMPARISON: Chest x-ray 10/14/2019 at 5:00 PM     TECHNIQUE:   Single radiographic AP view of the chest was obtained.     FINDINGS:  There is been placement of a right PICC line with the tip in the  superior vena cava. There is cardiomegaly with a transvenous pacemaker.  There are extensive bilateral alveolar infiltrates and pleural effusions  with pulmonary vascular congestion which have worsened from the previous  study.        Impression:       Under  1. PICC line in good position with the tip in the superior vena cava.  2. Findings suggesting changes of pulmonary edema and congestive failure  which are worsened from the previous study.     Electronically Signed ByIsrael Carpenter On:10/14/2019 7:03 PM  This report was finalized on [id number] by  Ulysses Carpenter, .    XR Chest 1 View [[id number]] Collected:  10/14/19 1713     Updated:  10/14/19 1716    Narrative:       DATE OF EXAM:  10/14/2019 5:00 PM     PROCEDURE:  XR CHEST 1 VW-     INDICATIONS:  soa     COMPARISON:  Chest x-ray 02/11/2019     TECHNIQUE:   Single radiographic AP view of the chest was obtained.     FINDINGS:  There is cardiomegaly with a transvenous  pacemaker. There is no  significant pulmonary vascular congestion however there are extensive  bibasilar areas of infiltrate and consolidation and effusion. There is a  possible PICC line on the right with the tip in the right axillary vein.          Impression:       Renomegaly. Basilar infiltrates and effusions. The appearance is  somewhat more suggestive of changes of pulmonary edema and congestive  failure. An underlying pneumonia cannot be excluded.     Electronically Signed By-Ulysses Carpenter On:10/14/2019 5:14 PM  This report was finalized on [id number] by  Ulysses Carpenter, .        reviewed    ECG/EMG Results (most recent)     Procedure Component Value Units Date/Time    ECG 12 Lead [[id number]] Collected:  10/14/19 1620     Updated:  10/15/19 0639    Narrative:       HEART RATE= 125  bpm  RR Interval= 481  ms  LA Interval= 141  ms  P Horizontal Axis=   deg  P Front Axis= 0  deg  QRSD Interval= 71  ms  QT Interval= 345  ms  QRS Axis= -22  deg  T Wave Axis= 37  deg  - ABNORMAL ECG -  Atrial Fibrillation  Inferior infarct, old  Compare to previous ventricle response decreased  When compared with ECG of 15-Feb-2019 0:28:46,  New or worsened ischemia or infarction  Electronically Signed By: Jossue Donovan (BARBARA) 15-Oct-2019 06:39:40  Date and Time of Study: 2019-10-14 16:20:23    ECG 12 Lead [[id number]] Collected:  10/14/19 1602     Updated:  10/15/19 0640    Narrative:       HEART RATE= 187  bpm  RR Interval= 320  ms  LA Interval=   ms  P Horizontal Axis=   deg  P Front Axis= 0  deg  QRSD Interval= 131  ms  QT Interval= 296  ms  QRS Axis= -82  deg  T Wave Axis= 151  deg  - ABNORMAL ECG -  Wide-QRS tachycardia  When compared with ECG of 15-Feb-2019 0:28:46,  Significant rate increase  Electronically Signed By: Jossue Donovan (BARBARA) 15-Oct-2019 06:40:17  Date and Time of Study: 2019-10-14 16:02:33    Adult Transthoracic Echo Complete W/ Cont if Necessary Per Protocol [[id number]] Collected:  10/15/19 0764      Updated:  10/16/19 0839     BSA 1.6 m^2       CV ECHO FEDERICO - RVDD 2.5 cm      IVSd 0.85 cm      LVIDd 3.6 cm      LVIDs 2.4 cm      LVPWd 0.9 cm      IVS/LVPW 0.94     FS 32.6 %      EDV(Teich) 54.0 ml      ESV(Teich) 20.6 ml      EF(Teich) 61.9 %      EDV(cubed) 46.2 ml      ESV(cubed) 14.2 ml      EF(cubed) 69.3 %      LV mass(C)d 88.9 grams      LV mass(C)dI 55.8 grams/m^2      SV(Teich) 33.4 ml      SI(Teich) 21.0 ml/m^2      SV(cubed) 32.0 ml      SI(cubed) 20.1 ml/m^2      Ao root diam 2.6 cm      Ao root area 5.2 cm^2      ACS 1.6 cm      LVOT diam 1.8 cm      LVOT area 2.4 cm^2      RVOT diam 2.4 cm      RVOT area 4.4 cm^2      EDV(MOD-sp4) 31.5 ml      ESV(MOD-sp4) 14.5 ml      EF(MOD-sp4) 54.0 %      SV(MOD-sp4) 17.0 ml      SI(MOD-sp4) 10.7 ml/m^2      Ao root area (BSA corrected) 1.6     LV [place] Vol (BSA corrected) 19.8 ml/m^2      LV Sys Vol (BSA corrected) 9.1 ml/m^2      MV E max jermaine 81.7 cm/sec      MV V2 max 83.7 cm/sec      MV max PG 2.8 mmHg      MV V2 mean 44.4 cm/sec      MV mean PG 1.1 mmHg      MV V2 VTI 13.8 cm      MVA(VTI) 1.8 cm^2      MV dec time 0.2 sec      LV V1 max PG 1.2 mmHg      LV V1 mean PG 0.59 mmHg      LV V1 max 54.8 cm/sec      LV V1 mean 35.8 cm/sec      LV V1 VTI 10.4 cm      MR max jermaine 255.2 cm/sec      MR max PG 26.1 mmHg      SV(LVOT) 25.4 ml      SV(RVOT) 34.2 ml      SI(LVOT) 15.9 ml/m^2      PA V2 max 61.0 cm/sec      PA max PG 1.5 mmHg      PA max PG (full) 0.43 mmHg       CV ECHO FEDERICO - PVA(V,A) 3.7 cm^2       CV ECHO FEDERICO - PVA(V,D) 3.7 cm^2      RV V1 max PG 1.1 mmHg      RV V1 mean PG 0.44 mmHg      RV V1 max 51.4 cm/sec      RV V1 mean 30.7 cm/sec      RV V1 VTI 7.8 cm      TR max jermaine 291.7 cm/sec      RVSP(TR) 37.0 mmHg      RAP systole 3.0 mmHg      Qp/Qs 1.3      CV ECHO FEDERICO - BZI_BMI 22.5 kilograms/m^2       CV ECHO FEDERICO - BSA([facility]) 1.6 m^2       CV ECHO FEDERICO - BZI_METRIC_WEIGHT 57.6 kg       CV ECHO FEDERICO - BZI_METRIC_HEIGHT 160.0 cm       EF(MOD-bp) 54.0 %      LA dimension(2D) 4.0 cm      Echo EF Estimated 60 %     Narrative:       · Estimated EF = 60%.  · Left ventricular systolic function is normal.  · Right ventricular cavity is moderately dilated.  · Left atrial cavity size is mild-to-moderately dilated.  · There is calcification of the aortic valve.  · Mild-to-moderate mitral valve regurgitation is present  · Mild tricuspid valve regurgitation is present.           reviewed         Results for orders placed during the hospital encounter of 10/14/19   Adult Transthoracic Echo Complete W/ Cont if Necessary Per Protocol    Narrative · Estimated EF = 60%.  · Left ventricular systolic function is normal.  · Right ventricular cavity is moderately dilated.  · Left atrial cavity size is mild-to-moderately dilated.  · There is calcification of the aortic valve.  · Mild-to-moderate mitral valve regurgitation is present  · Mild tricuspid valve regurgitation is present.          Adult Transthoracic Echo Complete W/ Cont if Necessary Per Protocol  · Estimated EF = 60%.  · Left ventricular systolic function is normal.  · Right ventricular cavity is moderately dilated.  · Left atrial cavity size is mild-to-moderately dilated.  · There is calcification of the aortic valve.  · Mild-to-moderate mitral valve regurgitation is present  · Mild tricuspid valve regurgitation is present.         Active Hospital Problems    Diagnosis  POA   • Atrial fibrillation with RVR (CMS/HCC) [I48.91]  Unknown   • Acute on chronic renal failure (CMS/HCC) [N17.9, N18.9]  Unknown   • Elevated brain natriuretic peptide (BNP) level [R79.89]  Unknown   • Elevated troponin [R79.89]  Unknown      Resolved Hospital Problems    Diagnosis Date Resolved POA   • Septic shock (CMS/HCC) [A41.9, R65.21] 10/17/2019 Yes         Assessment/Plan       Atrial fibrillation with RVR (CMS/HCC)    Acute on chronic renal failure (CMS/HCC)    Elevated brain natriuretic peptide (BNP) level    Elevated  troponin    Septic shock likely related to urosepsis with hx of ESBL E. coli UTI  - urine culture positive for E. Coli  - lactic acid 9.1 on admission, currently 1.6  - continue Merrem   - patient previously on Vancomycin, discontinued   - diabetic diet     Acute hypoxic respiratory failure  - CXR: pulmonary edema   - respiratory viral panel negative     Bilateral pleural effusions  - diuresis per renal   - imaging as above     Leukocytosis, mild  - WBC 13.1  - monitor     Hyperkalemia, resolved  - K 3.8  - insulin, calcium, D50 and bicarb given on admission  - monitor     Elevated troponin, likely demand ischemia  - troponin 0.34, 0.34, 0.43  - cardiology following, continue conservative treatment    Acute on chronic kidney disease stage III  - baseline Cr 1.4  - BUN 48 / Cr 2.05  - renal ultrasound negative  - nephrology following   - off bicarb drip   - IVF 1/2 NS @ 75ml/hr per nephrology    Atrial fibrillation with RVR s/p permanent pacemaker   - TTE: EF 60%  - continue amiodarone drip per cardiology   - metoprolol 12.5 mg BID added   - on chronic anticoagulation with Eliquis  - cardiac monitoring     Possible ileus noted on CT   - patient had NG tube placed, since removed     H/o Hypertension  - on home BB    H/o LLE DVT and PE    Depression  - continue home Zoloft and Remeron     Hypothyroidism  - continue home synthroid     New onset CHF    H/o ileostomy r/t C. Diff    VTE prophylaxis - bilateral SCDs, Eliquis      Disposition  Defer for now, will return to [facility] [place] pending precert    I discussed the patients findings and my recommendations with patient and the patient is agreeable to this plan.     Rosaura Jarvis PA-C  10/17/19  12:55 PM

## 2019-10-17 NOTE — PLAN OF CARE
Problem: Fall Risk (Adult)  Goal: Identify Related Risk Factors and Signs and Symptoms  Outcome: Ongoing (interventions implemented as appropriate)   10/17/19 0305   Fall Risk (Adult)   Related Risk Factors (Fall Risk) age-related changes;bladder function altered;fatigue/slow reaction;fear of falling;gait/mobility problems;history of falls;homeostatic imbalance;polypharmacy;sleep pattern alteration;environment unfamiliar   Signs and Symptoms (Fall Risk) presence of risk factors     Goal: Absence of Fall  Outcome: Ongoing (interventions implemented as appropriate)   10/17/19 0305   Fall Risk (Adult)   Absence of Fall making progress toward outcome       Problem: Skin Injury Risk (Adult)  Goal: Identify Related Risk Factors and Signs and Symptoms  Outcome: Ongoing (interventions implemented as appropriate)   10/17/19 0305   Skin Injury Risk (Adult)   Related Risk Factors (Skin Injury Risk) advanced age;critical care admission;hospitalization prolonged;infection;medical devices;mobility impaired;nutritional deficiencies;tissue perfusion altered     Goal: Skin Health and Integrity  Outcome: Ongoing (interventions implemented as appropriate)   10/17/19 0305   Skin Injury Risk (Adult)   Skin Health and Integrity making progress toward outcome       Problem: Sepsis/Septic Shock (Adult)  Goal: Signs and Symptoms of Listed Potential Problems Will be Absent, Minimized or Managed (Sepsis/Septic Shock)  Outcome: Ongoing (interventions implemented as appropriate)   10/17/19 0305   Goal/Outcome Evaluation   Problems Assessed (Sepsis) all   Problems Present (Sepsis) undernutrition

## 2019-10-17 NOTE — PROGRESS NOTES
"    Reason for follow-up: Atrial fibrillation with rapid ventricular rate  Status post permanent pacemaker placement  Sepsis     Patient Care Team:  Florentino Kimbrough MD as PCP - General (Family Medicine)  Gosia Hodgson MD as PCP - Family Medicine    Subjective .   Feels okay     Review of Systems   Constitution: Positive for malaise/fatigue.       Cephalexin; Sulfadiazine; Trimethoprim; and Trospium    Scheduled Meds:    apixaban 2.5 mg Oral Q12H   dexamethasone 1 drop Both Eyes Q8H   insulin lispro 0-7 Units Subcutaneous 4x Daily With Meals & Nightly   levothyroxine 75 mcg Oral QAM AC   meropenem 500 mg Intravenous Q24H   metoprolol tartrate 12.5 mg Oral Q12H   mirtazapine 7.5 mg Oral Nightly   polyethyl glycol-propyl glycol 1 drop Both Eyes BID   potassium chloride 20 mEq Oral Daily   sertraline 25 mg Oral Daily   sodium chloride 10 mL Intravenous Q12H     Continuous Infusions:    amiodarone 0.5 mg/min Last Rate: 0.5 mg/min (10/17/19 1449)   Pharmacy to Dose meropenem (MERREM)     phenylephrine 0.5-3 mcg/kg/min Last Rate: Stopped (10/16/19 0830)   sodium chloride 75 mL/hr Last Rate: 75 mL/hr (10/17/19 1700)     PRN Meds:.dextrose  •  dextrose  •  glucagon (human recombinant)  •  metoprolol tartrate  •  ondansetron  •  Pharmacy to Dose meropenem (MERREM)  •  sodium chloride  •  sodium chloride    Objective   Looks comfortable lying in the bed    VITAL SIGNS  Vitals:    10/17/19 1014 10/17/19 1200 10/17/19 1615 10/17/19 1719   BP: 165/93  162/80    Pulse: 104  103 100   Resp:    17   Temp:  98.8 °F (37.1 °C)  98.2 °F (36.8 °C)   TempSrc:    Oral   SpO2: 98%  99% 95%   Weight:       Height:           Flowsheet Rows      First Filed Value   Admission Height  160 cm (63\") Documented at 10/14/2019 1551   Admission Weight  48.3 kg (106 lb 7.7 oz) Documented at 10/14/2019 1551           TELEMETRY: Atrial fibrillation with rapid ventricular rate    Physical Exam:  Physical Exam   Constitutional: She is oriented to " person, place, and time. She appears well-developed and well-nourished. She is cooperative.   HENT:   Head: Normocephalic and atraumatic.   Mouth/Throat: Uvula is midline and oropharynx is clear and moist. No oral lesions.   Eyes: Conjunctivae are normal. No scleral icterus.   Neck: Trachea normal. Neck supple. No JVD present. Carotid bruit is not present. No thyromegaly present.   Cardiovascular: Normal rate, S1 normal, S2 normal, normal heart sounds, intact distal pulses and normal pulses. An irregularly irregular rhythm present. PMI is not displaced. Exam reveals no gallop and no friction rub.   No murmur heard.  Pulmonary/Chest: Effort normal and breath sounds normal.   Abdominal: Soft. Bowel sounds are normal.   Musculoskeletal: Normal range of motion.   Neurological: She is alert and oriented to person, place, and time. She has normal strength.   No focal deficits   Skin: Skin is warm. No cyanosis.   Psychiatric: She has a normal mood and affect.                LAB RESULTS (LAST 7 DAYS)    CBC  Results from last 7 days   Lab Units 10/17/19  0405 10/16/19  0400 10/15/19  0424 10/15/19  0034 10/14/19  1620   WBC 10*3/mm3 13.10* 20.50* 24.90* 21.70* 18.80*   RBC 10*6/mm3 3.57* 3.59* 3.72* 3.55* 4.29   HEMOGLOBIN g/dL 11.0* 11.0* 11.2* 11.0* 13.1   HEMATOCRIT % 34.7 34.2 36.4 35.1 44.9   MCV fL 97.0 95.2 97.9* 98.9* 104.8*   PLATELETS 10*3/mm3 116* 160 185 187 210       BMP  Results from last 7 days   Lab Units 10/17/19  1653 10/17/19  0405 10/16/19  0400 10/15/19  1121 10/15/19  0424 10/15/19  0029 10/14/19  1647 10/14/19  1619   SODIUM mmol/L 139 142 148* 146* 147* 148* 144 142   POTASSIUM mmol/L 4.1 3.8 3.6 4.2 4.6 4.3 5.2* 5.2*   CHLORIDE mmol/L 99 100 105 104 107 109 102 103   CO2 mmol/L 28.0 31.0* 33.0* 23.0 20.0* 15.0* 14.0* 12.0*   BUN mg/dL 40* 48* 63* 68* 69* 67* 71* 71*   CREATININE mg/dL 1.70* 2.05* 2.65* 3.18* 3.50* 3.70* 3.80* 4.00*   GLUCOSE mg/dL 169* 146* 162* 213* 161* 101* 142* 152*   MAGNESIUM  mg/dL  --  2.0 2.4*  --  2.8*  --   --  3.4*   PHOSPHORUS mg/dL  --  2.8 3.1  --  4.3  --   --  6.5*       CMP   Results from last 7 days   Lab Units 10/17/19  1653 10/17/19  0405 10/16/19  0400 10/15/19  1121 10/15/19  0424 10/15/19  0029 10/14/19  1647 10/14/19  1619   SODIUM mmol/L 139 142 148* 146* 147* 148* 144 142   POTASSIUM mmol/L 4.1 3.8 3.6 4.2 4.6 4.3 5.2* 5.2*   CHLORIDE mmol/L 99 100 105 104 107 109 102 103   CO2 mmol/L 28.0 31.0* 33.0* 23.0 20.0* 15.0* 14.0* 12.0*   BUN mg/dL 40* 48* 63* 68* 69* 67* 71* 71*   CREATININE mg/dL 1.70* 2.05* 2.65* 3.18* 3.50* 3.70* 3.80* 4.00*   GLUCOSE mg/dL 169* 146* 162* 213* 161* 101* 142* 152*   ALBUMIN g/dL  --   --   --  3.00*  --   --  3.50 3.50   BILIRUBIN mg/dL  --   --   --  1.4*  --   --  2.3* 2.1*   ALK PHOS U/L  --   --   --  94  --   --  97* 97*   AST (SGOT) U/L  --   --   --  78*  --   --  51* 51*   ALT (SGPT) U/L  --   --   --  27  --   --  <5* <5*         BNP  Results from last 7 days   Lab Units 10/14/19  1619   BNP pg/mL 1,784.0*       TROPONIN  Results from last 7 days   Lab Units 10/15/19  1121 10/15/19  0424   TROPONIN I ng/mL  --  0.430*   TROPONIN T ng/mL 0.134*  --        CoAg  Results from last 7 days   Lab Units 10/16/19  0400 10/15/19  0423 10/14/19  1619   INR  2.10* 3.14* 2.44*   APTT seconds 24.7  --  26.2       Creatinine Clearance  Estimated Creatinine Clearance: 19.6 mL/min (A) (by C-G formula based on SCr of 1.7 mg/dL (H)).    ABG  Results from last 7 days   Lab Units 10/15/19  0426 10/14/19  2358 10/14/19  1646   PH, ARTERIAL pH units 7.383 7.151* 7.185*   PCO2, ARTERIAL mm Hg 32.0* 22.2* 31.9*   PO2 ART mm Hg 93.3 91.2 19.5*   O2 SATURATION ART % 97.2 94.4 21.8*   BASE EXCESS ART mmol/L -5.1* -19.3* -15.1*       Radiology  No radiology results for the last day          EKG        I personally viewed and interpreted the patient's EKG/Telemetry data:    ECHOCARDIOGRAM:    Results for orders placed during the hospital encounter of  10/14/19   Adult Transthoracic Echo Complete W/ Cont if Necessary Per Protocol    Narrative · Estimated EF = 60%.  · Left ventricular systolic function is normal.  · Right ventricular cavity is moderately dilated.  · Left atrial cavity size is mild-to-moderately dilated.  · There is calcification of the aortic valve.  · Mild-to-moderate mitral valve regurgitation is present  · Mild tricuspid valve regurgitation is present.        STRESS MYOVIEW:    CARDIAC CATHETERIZATION:    OTHER:         Assessment/Plan       Atrial fibrillation with RVR (CMS/HCC)    Acute on chronic renal failure (CMS/HCC)    Elevated brain natriuretic peptide (BNP) level    Elevated troponin      Continue antibiotics per primary team  Continue IV amiodarone   Restart beta-blockers  Acute on chronic renal failure per primary team creatinine is better  Elevated BNP caution with diuresis  Elevated troponin probably demand ischemia continue conservative treatment  Elevated INR probably from sepsis will recheck tomorrow    I discussed the patients findings and my recommendations with patient and attending nurse    Royal Palacios MD  10/17/19  6:02 PM

## 2019-10-17 NOTE — PROGRESS NOTES
Pulmonary/ Critical Care progress Note        Patient Name:  Dinora Vásquez    :  1926    Medical Record:  [id number]    Primary Care Physician     Florentino Kimbrough MD [facility]  Dinora Vásquez is a 89 y.o. female who was presented to the ER after being seen earlier by Nephrology and found to be hypotensive and tachycardic; in addition she complained of dyspnea, abdominal pain and not feeling well.  She had a bolus of IVFs started and transferred to the ER.  Workup in the ER revealed afib with RVR, UTI, septic shock, CHF, STEMI, acute hypoxic respiratory failure and LEYLA.  She had IVFs per sepsis protocol given and started on Meropenem.  She was given a bolus of amio and Cardizem without improvement in her heart rate and subsequently developed hypotension.  She had a PICC ordered by the ER and was started on Levophed.   The patient is currently difficult to get a history from as she is obtunded.  Her sats were in the 70's and she was started on Precision Flow with improvement.      10/15/19: Short of breath with minimal activity.  Remains on Precision flow supplemental oxygen.  No chest pains.  No nausea or vomiting  10/16/19:  Improved today on 8L high flow nasal cannula.  HR remains 120s.  No abdominal pain.  No CP  10/17/19:  No issues overnight.  Off vasopressor     Review of Systems    As above        Home medicationS  Prior to Admission medications    Medication Sig Start Date End Date Taking? Authorizing Provider   cholecalciferol (VITAMIN D3) 1000 units tablet Daily. 17   ProviderAravind MD   Cranberry 600 MG tablet Take 600 mg by mouth 3 (Three) Times a Day.    Aravind Silva MD   ELIQUIS 2.5 MG tablet tablet Take 2.5 mg by mouth 2 (Two) Times a Day. 19   Aravind Silva MD   fluorometholone (FML) 0.1 % ophthalmic suspension Administer 1 Drop/kg to both eyes Daily. 19   Aravind Silva MD   levothyroxine (SYNTHROID, LEVOTHROID) 75 MCG tablet Every  Morning Before Breakfast. 6/11/19   Aravind Silva MD   loratadine (CLARITIN) 10 MG tablet Take 10 mg by mouth Daily.    Aravind Silva MD   Magnesium 400 MG tablet MAGNESIUM 400 MG TABS 11/2/18   Aravind Silva MD   metoprolol tartrate (LOPRESSOR) 50 MG tablet Take 50 mg by mouth 2 (Two) Times a Day. 6/11/19   Aravind Silva MD   mirtazapine (REMERON) 7.5 MG tablet 7.5 mg Daily. 6/18/19   Aravind Silva MD   polyvinyl alcohol (ARTIFICIAL TEARS) 1.4 % ophthalmic solution Every 12 (Twelve) Hours. 11/2/18   Aravind Silva MD   Prenatal w/o A Vit-Fe Fum-FA (BP MULTINATAL PLUS) 30-1 MG tablet Daily. 12/19/17   Aravind Silva MD   saccharomyces boulardii (FLORASTOR) 250 MG capsule FLORASTOR 250 MG CAPS 6/11/18   Aravind Silva MD   sertraline (ZOLOFT) 25 MG tablet Take 25 mg by mouth Daily. 6/11/19   Aravind Silva MD       Medical History    Past Medical History:   Diagnosis Date   • Ankle wound, right, initial encounter    • Aortic stenosis    • Atrial fibrillation (CMS/HCC)    • Coronary artery disease    • DVT (deep venous thrombosis) (CMS/HCC)    • Hypothyroidism    • Left bundle branch block    • Pulmonary embolus (CMS/HCC)    • Pulmonary hypertension (CMS/HCC)    • Sinus bradycardia         Surgical History    Past Surgical History:   Procedure Laterality Date   • COLOSTOMY     • ORIF ANKLE FRACTURE Right 2013    Right Ankle ORIF    • PACEMAKER IMPLANTATION  11/29/2017    Dual Chamber [employer]   • TOTAL ABDOMINAL HYSTERECTOMY          Family History    Family History   Problem Relation Age of Onset   • Heart disease Mother    • Diabetes Mother    • Heart disease Sister    • Diabetes Sister        Social History    Social History     Tobacco Use   • Smoking status: Never Smoker   • Smokeless tobacco: Never Used   Substance Use Topics   • Alcohol use: No     Frequency: Never        Allergies    Allergies   Allergen Reactions   • Cephalexin  Swelling   • Sulfadiazine Rash   • Trimethoprim Hives   • Trospium Hives       Medications    Scheduled Meds:    apixaban 2.5 mg Oral Q12H   dexamethasone 1 drop Both Eyes Q8H   insulin lispro 0-7 Units Subcutaneous 4x Daily With Meals & Nightly   levothyroxine 75 mcg Oral QAM AC   meropenem 500 mg Intravenous Q24H   mirtazapine 7.5 mg Oral Nightly   polyethyl glycol-propyl glycol 1 drop Both Eyes BID   potassium chloride 20 mEq Oral Daily   sertraline 25 mg Oral Daily   sodium chloride 10 mL Intravenous Q12H     Continuous Infusions:    amiodarone 0.5 mg/min Last Rate: 0.5 mg/min (10/16/19 2328)   Pharmacy to Dose meropenem (MERREM)     phenylephrine 0.5-3 mcg/kg/min Last Rate: Stopped (10/16/19 0830)   sodium chloride 75 mL/hr Last Rate: 75 mL/hr (10/16/19 1540)     PRN Meds:.dextrose  •  dextrose  •  glucagon (human recombinant)  •  metoprolol tartrate  •  ondansetron  •  Pharmacy to Dose meropenem (MERREM)  •  sodium chloride  •  sodium chloride      Physical Exam    tMax 24 hrs:  Temp (24hrs), Av.9 °F (36.6 °C), Min:97.5 °F (36.4 °C), Max:98.3 °F (36.8 °C)    Vitals Ranges:  Temp:  [97.5 °F (36.4 °C)-98.3 °F (36.8 °C)] 97.8 °F (36.6 °C)  Heart Rate:  [] 101  Resp:  [15-18] 18  BP: (105-159)/() 115/50  Intake and Output Last 3 Shifts:  I/O last 3 completed shifts:  In: 4485 [P.O.:360; I.V.:4125]  Out: 1115 [Urine:1015; Stool:100]    Constitution:  NAD   HEENT:  Atraumatic, PERRL, conjunctiva normal, moist oral mucosa, no nasal discharge.  Trachea is midline.  Respiratory:  No respiratory distress. Diminished breath sounds bilaterally.    Cardiovascular:  AFIB  Pulses 2+ and equal in all four extremities.    GI:  Soft, nondistended.  Nontender to palpation.   Ileostomy  with stool output noted.   Extremities: No edema, cyanosis or tenderness.  Integument:  No rashes.   Neurologic:  Alert and oriented x 3.  Moves all four extremities to painful stimuli.   No focal neurologic deficits observed.       labs    Lab Results (last 24 hours)     Procedure Component Value Units Date/Time    POC Glucose Once [[id number]]  (Abnormal) Collected:  10/17/19 0732    Specimen:  Blood Updated:  10/17/19 0736     Glucose 128 mg/dL      Comment: Serial Number: [id number]:  [id number]       Blood Culture - Blood, Arm, Right [[id number]] Collected:  10/14/19 1647    Specimen:  Blood from Arm, Right Updated:  10/17/19 0608     Blood Culture Growth present, too young to evaluate     Gram Stain Aerobic Bottle Gram positive bacilli resembling diphtheroids    Narrative:       Blood culture does not meet the specified criteria for PCR identification.  If pregnant, immunocompromised, or clinical concern for meningitis, call lab to run BCID for Listeria monocytogenes.    Basic Metabolic Panel [[id number]]  (Abnormal) Collected:  10/17/19 0405    Specimen:  Blood Updated:  10/17/19 0441     Glucose 146 mg/dL      BUN 48 mg/dL      Creatinine 2.05 mg/dL      Sodium 142 mmol/L      Potassium 3.8 mmol/L      Chloride 100 mmol/L      CO2 31.0 mmol/L      Calcium 7.5 mg/dL      eGFR Non African Amer 23 mL/min/1.73      BUN/Creatinine Ratio 23.4     Anion Gap 14.8 mmol/L     Narrative:       GFR Normal >60  Chronic Kidney Disease <60  Kidney Failure <15    Magnesium [[id number]]  (Normal) Collected:  10/17/19 0405    Specimen:  Blood Updated:  10/17/19 0441     Magnesium 2.0 mg/dL     Phosphorus [[id number]]  (Normal) Collected:  10/17/19 0405    Specimen:  Blood Updated:  10/17/19 0441     Phosphorus 2.8 mg/dL     Lactic Acid, Plasma [[id number]]  (Normal) Collected:  10/17/19 0405    Specimen:  Blood Updated:  10/17/19 0436     Lactate 1.6 mmol/L     CBC & Differential [[id number]] Collected:  10/17/19 0405    Specimen:  Blood Updated:  10/17/19 0416    Narrative:       The following orders were created for panel order CBC & Differential.  Procedure                               Abnormality         Status                     ---------                                -----------         ------                     CBC Auto Differential[[id number]]        Abnormal            Final result                 Please view results for these tests on the individual orders.    CBC Auto Differential [[id number]]  (Abnormal) Collected:  10/17/19 0405    Specimen:  Blood Updated:  10/17/19 0416     WBC 13.10 10*3/mm3      RBC 3.57 10*6/mm3      Hemoglobin 11.0 g/dL      Hematocrit 34.7 %      MCV 97.0 fL      MCH 30.9 pg      MCHC 31.8 g/dL      RDW 18.2 %      RDW-SD 62.1 fl      MPV 7.8 fL      Platelets 116 10*3/mm3      Neutrophil % 85.2 %      Lymphocyte % 6.4 %      Monocyte % 7.6 %      Eosinophil % 0.5 %      Basophil % 0.3 %      Neutrophils, Absolute 11.20 10*3/mm3      Lymphocytes, Absolute 0.80 10*3/mm3      Monocytes, Absolute 1.00 10*3/mm3      Eosinophils, Absolute 0.10 10*3/mm3      Basophils, Absolute 0.00 10*3/mm3      nRBC 0.3 /100 WBC     POC Glucose Once [[id number]]  (Abnormal) Collected:  10/1934    Specimen:  Blood Updated:  10/16/19 1935     Glucose 116 mg/dL      Comment: Serial Number: [id number]:  [id number]       POC Glucose Once [[id number]]  (Abnormal) Collected:  10/16/19 1812    Specimen:  Blood Updated:  10/16/19 1815     Glucose 118 mg/dL      Comment: Serial Number: [id number]:  [id number]       Blood Culture - Blood, Blood, Venous Line [[id number]] Collected:  10/14/19 1716    Specimen:  Blood, Venous Line Updated:  10/16/19 1730     Blood Culture No growth at 2 days    Urine Culture - Urine, Urine, Catheter [[id number]]  (Abnormal)  (Susceptibility) Collected:  10/14/19 1929    Specimen:  Urine, Catheter Updated:  10/16/19 1248     Urine Culture >100,000 CFU/mL Escherichia coli ESBL     Comment:   Consider infectious disease consult.  Susceptibility results may not correlate to clinical outcomes.       Susceptibility      Escherichia coli ESBL     WESLEY     Gentamicin Resistant     Levofloxacin Resistant     Meropenem  Susceptible     Nitrofurantoin Susceptible     Piperacillin + Tazobactam Susceptible     Tetracycline Resistant     Trimethoprim + Sulfamethoxazole Resistant                    POC Glucose Once [[id number]]  (Abnormal) Collected:  10/16/19 1127    Specimen:  Blood Updated:  10/16/19 1135     Glucose 178 mg/dL      Comment: Serial Number: [id number]:  [id number]             Imaging & Other Studies    Imaging Results (last 72 hours)     Procedure Component Value Units Date/Time    CT Abdomen Pelvis Without Contrast [[id number]] Collected:  10/14/19 1912     Updated:  10/14/19 1925    Narrative:          DATE OF EXAM:  10/14/2019 6:53 PM     PROCEDURE:  CT ABDOMEN PELVIS WO CONTRAST-     INDICATIONS:  pain; A41.9-Sepsis, unspecified organism; R65.21-Severe sepsis with  septic shock     COMPARISON:  CT scan of the abdomen and pelvis 02/16/2019     TECHNIQUE:  Routine transaxial slices were obtained through the abdomen and pelvis  without the administration of intravenous contrast. Reconstructed  coronal and sagittal images were also obtained. Automated exposure  control and iterative construction methods were used.     FINDINGS:  There are moderate to large bilateral pleural effusions with associated  bibasilar dependent subsegmental atelectasis which are worsened from the  previous CT scan. There is a transvenous pacemaker. The liver and spleen  and adrenal glands and pancreas and kidneys appear normal. There is free  fluid deep in the pelvis unusual for a patient of this age. The patient  appears to have had a subtotal colectomy with only the rectum and distal  most aspect of the sigmoid colon remaining. There are scattered small  bowel air-fluid levels in nondistended loops. The small bowel within the  subcutaneous tissues passing from the abdominal wall to the skin does  show some redundancy however there is no significant small bowel  distention to suggest obstruction. There is mild diffuse increased  soft  tissue stranding in the subcutaneous fat and mesenteric fat. There are  degenerative changes of the lumbar spine.       Impression:          1. Subtotal colectomy. There is an ileostomy in the right lower  quadrant. The subcutaneous portion of the ileal loop is slightly  redundant however there is no small bowel distention to suggest  obstruction. There are some scattered small bowel air-fluid levels in  nondistended loops which could reflect an associated mild ileus or  enteritis.  2. Moderate to large bilateral pleural effusions and dependent bibasilar  atelectasis. In addition there is a mild amount of ascites deep in the  pelvis and there is diffuse increased soft tissue stranding in the  subcutaneous fat and mesenteric fat. The constellation of these findings  does raise concern for changes of congestive heart failure.  3. Incidental note is made of a 3 cm right lateral hernia containing  mesenteric fat.     Electronically Signed By-Ulysses Carpenter On:10/14/2019 7:16 PM  This report was finalized on [id number] by  Ulysses Carpenter, .    XR Chest 1 View [[id number]] Collected:  10/14/19 1902     Updated:  10/14/19 1905    Narrative:       DATE OF EXAM:  10/14/2019 6:45 PM     PROCEDURE:  XR CHEST 1 VW-     INDICATIONS:  picc tip verification     COMPARISON: Chest x-ray 10/14/2019 at 5:00 PM     TECHNIQUE:   Single radiographic AP view of the chest was obtained.     FINDINGS:  There is been placement of a right PICC line with the tip in the  superior vena cava. There is cardiomegaly with a transvenous pacemaker.  There are extensive bilateral alveolar infiltrates and pleural effusions  with pulmonary vascular congestion which have worsened from the previous  study.        Impression:       Under  1. PICC line in good position with the tip in the superior vena cava.  2. Findings suggesting changes of pulmonary edema and congestive failure  which are worsened from the previous study.     Electronically Signed  By-Ulysses Carpenter On:10/14/2019 7:03 PM  This report was finalized on [id number] by  Ulysses Carpenter, .    XR Chest 1 View [[id number]] Collected:  10/14/19 1713     Updated:  10/14/19 1716    Narrative:       DATE OF EXAM:  10/14/2019 5:00 PM     PROCEDURE:  XR CHEST 1 VW-     INDICATIONS:  soa     COMPARISON:  Chest x-ray 02/11/2019     TECHNIQUE:   Single radiographic AP view of the chest was obtained.     FINDINGS:  There is cardiomegaly with a transvenous pacemaker. There is no  significant pulmonary vascular congestion however there are extensive  bibasilar areas of infiltrate and consolidation and effusion. There is a  possible PICC line on the right with the tip in the right axillary vein.          Impression:       Renomegaly. Basilar infiltrates and effusions. The appearance is  somewhat more suggestive of changes of pulmonary edema and congestive  failure. An underlying pneumonia cannot be excluded.     Electronically Signed By-Ulysses Carpenter On:10/14/2019 5:14 PM  This report was finalized on [id number] by  Ulysses Carpenter, .          Assessment/plan  Septic shock likely related to urosepsis with hx of ESBL E. Coli UTI  Acute hypoxic respiratory failure  Leukocytosis   Bilateral pleural effusions   Hyperkalemia  LELYA  Atrial fib with RVR  Acute hypoxic respiratory failure  H/o Hypertension  H/o LLE DVT and PE  New onset CHF  Chronic anticoagulation with Eliquis  ?Ileus  H/o ileostomy r/t C. Diff  NSTEMI    Plan:    -cont Merrem (hx of ESBL UTI).  D/C Vanco  -Precision Flow and now on 2L high flow nasal cannula, cont to wean for sats 94%  -CXR with pulmonary edema  -ABGs reviewed   -on AMIO gtt per cards  -add Metoprolol 12.5 mg BID  -currently off AREN  -resp viral panel negative   -Urine cx + Ecoli   -blood cx negative   -TTE reviewed, EF 60%  -troponin 0.43  -lactic acid 5.2 and now 1.6  -replace electrolytes as needed   -IVF bolus of 1449 in the ER  -Insulin, calcium, D50 and bicarb for  hyperkalemia, K+ 4.3 given at admission  -Nephrology consulted  -OFF HCO3 gtt  -IVFs, 1/2 NS 75ml/hr per renal  -resume Eliquis   -Renal ultrasound negative  -Cardiology consulted   -Diureses per renal  -passed swallow eval, diet ordered   -PT eval    PICC    PUD: Famotidine  Insulin:  Sliding scale  VTE:  SCDs, Eliquis   Nutrition: Diet ordered     DNR      Transfer out of ICU

## 2019-10-18 NOTE — PROGRESS NOTES
Pulmonary/ Critical Care progress Note        Patient Name:  Dinora Vásquez    :  1926    Medical Record:  [id number]    Primary Care Physician     Florentino Kimbrough MD [facility]  Dinora Vásquez is a 89 y.o. female who was presented to the ER after being seen earlier by Nephrology and found to be hypotensive and tachycardic; in addition she complained of dyspnea, abdominal pain and not feeling well.  She had a bolus of IVFs started and transferred to the ER.  Workup in the ER revealed afib with RVR, UTI, septic shock, CHF, STEMI, acute hypoxic respiratory failure and LEYLA.  She had IVFs per sepsis protocol given and started on Meropenem.  She was given a bolus of amio and Cardizem without improvement in her heart rate and subsequently developed hypotension.  She had a PICC ordered by the ER and was started on Levophed.   The patient is currently difficult to get a history from as she is obtunded.  Her sats were in the 70's and she was started on Precision Flow with improvement.      10/15/19: Short of breath with minimal activity.  Remains on Precision flow supplemental oxygen.  No chest pains.  No nausea or vomiting  10/16/19:  Improved today on 8L high flow nasal cannula.  HR remains 120s.  No abdominal pain.  No CP  10/17/19:  No issues overnight.  Off vasopressor   10/18: reports feeling somewhat better.  Tolerating O2 7L NC.  Continued on amiodarone gtt, denies chest pain.  Required continuous AVAP over night for hypercapnia, now improving. Denies SOA at rest     Review of Systems    As above        Home medicationS  Prior to Admission medications    Medication Sig Start Date End Date Taking? Authorizing Provider   cholecalciferol (VITAMIN D3) 1000 units tablet Daily. 17   ProviderAravind MD   Cranberry 600 MG tablet Take 600 mg by mouth 3 (Three) Times a Day.    ProviderAravind MD   ELIQUIS 2.5 MG tablet tablet Take 2.5 mg by mouth 2 (Two) Times a Day. 19   Provider  MD Aravind   fluorometholone (FML) 0.1 % ophthalmic suspension Administer 1 Drop/kg to both eyes Daily. 5/21/19   Aravind Silva MD   levothyroxine (SYNTHROID, LEVOTHROID) 75 MCG tablet Every Morning Before Breakfast. 6/11/19   Aravind Silva MD   loratadine (CLARITIN) 10 MG tablet Take 10 mg by mouth Daily.    Aravind Silva MD   Magnesium 400 MG tablet MAGNESIUM 400 MG TABS 11/2/18   Aravind Silva MD   metoprolol tartrate (LOPRESSOR) 50 MG tablet Take 50 mg by mouth 2 (Two) Times a Day. 6/11/19   Aravind Silva MD   mirtazapine (REMERON) 7.5 MG tablet 7.5 mg Daily. 6/18/19   Aravind Silva MD   polyvinyl alcohol (ARTIFICIAL TEARS) 1.4 % ophthalmic solution Every 12 (Twelve) Hours. 11/2/18   Aravind Silva MD   Prenatal w/o A Vit-Fe Fum-FA (BP MULTINATAL PLUS) 30-1 MG tablet Daily. 12/19/17   Aravind Silva MD   saccharomyces boulardii (FLORASTOR) 250 MG capsule FLORASTOR 250 MG CAPS 6/11/18   Aravind Silva MD   sertraline (ZOLOFT) 25 MG tablet Take 25 mg by mouth Daily. 6/11/19   Aravind Silva MD       Medical History    Past Medical History:   Diagnosis Date   • Ankle wound, right, initial encounter    • Aortic stenosis    • Atrial fibrillation (CMS/HCC)    • Coronary artery disease    • DVT (deep venous thrombosis) (CMS/HCC)    • Hypothyroidism    • Left bundle branch block    • Pulmonary embolus (CMS/HCC)    • Pulmonary hypertension (CMS/HCC)    • Sinus bradycardia         Surgical History    Past Surgical History:   Procedure Laterality Date   • COLOSTOMY     • ORIF ANKLE FRACTURE Right 2013    Right Ankle ORIF    • PACEMAKER IMPLANTATION  11/29/2017    Dual Chamber [employer]   • TOTAL ABDOMINAL HYSTERECTOMY          Family History    Family History   Problem Relation Age of Onset   • Heart disease Mother    • Diabetes Mother    • Heart disease Sister    • Diabetes Sister        Social History    Social History     Tobacco Use    • Smoking status: Never Smoker   • Smokeless tobacco: Never Used   Substance Use Topics   • Alcohol use: No     Frequency: Never        Allergies    Allergies   Allergen Reactions   • Cephalexin Swelling   • Sulfadiazine Rash   • Trimethoprim Hives   • Trospium Hives       Medications    Scheduled Meds:    apixaban 2.5 mg Oral Q12H   dexamethasone 1 drop Both Eyes Q8H   insulin lispro 0-7 Units Subcutaneous 4x Daily With Meals & Nightly   levothyroxine 75 mcg Oral QAM AC   meropenem 500 mg Intravenous Q12H   metoprolol tartrate 25 mg Oral Q12H   mirtazapine 7.5 mg Oral Nightly   polyethyl glycol-propyl glycol 1 drop Both Eyes BID   potassium chloride 20 mEq Oral Daily   prenatal vitamin 27-0.8 1 tablet Oral Daily   sertraline 25 mg Oral Daily   sodium chloride 10 mL Intravenous Q12H     Continuous Infusions:    amiodarone 0.5 mg/min Last Rate: 0.5 mg/min (10/18/19 0916)   Pharmacy to Dose meropenem (MERREM)     sodium chloride 75 mL/hr Last Rate: 75 mL/hr (10/17/19 1700)     PRN Meds:.dextrose  •  dextrose  •  glucagon (human recombinant)  •  metoprolol tartrate  •  ondansetron  •  Pharmacy to Dose meropenem (MERREM)  •  sodium chloride  •  sodium chloride      Physical Exam    tMax 24 hrs:  Temp (24hrs), Av °F (36.1 °C), Min:95.1 °F (35.1 °C), Max:98.2 °F (36.8 °C)    Vitals Ranges:  Temp:  [95.1 °F (35.1 °C)-98.2 °F (36.8 °C)] 97.4 °F (36.3 °C)  Heart Rate:  [] 96  Resp:  [17-28] 28  BP: (109-165)/(44-89) 109/44  Intake and Output Last 3 Shifts:  I/O last 3 completed shifts:  In: 1939 [P.O.:480; I.V.:1459]  Out: 500 [Urine:400; Stool:100]    Constitution:  NAD   HEENT:  Atraumatic, PERRL, conjunctiva normal, moist oral mucosa, no nasal discharge.  Trachea is midline.  Respiratory:  No respiratory distress. Diminished breath sounds bilaterally.    Cardiovascular:  AFIB, irreg/irreg  GI:  Soft, nondistended.  Nontender to palpation.   Ileostomy  with stool output noted.   Extremities: No edema, cyanosis  or tenderness.  Integument:  No rashes.   Neurologic:  Alert and oriented x 3.  Moves all four extremities to painful stimuli.   No focal neurologic deficits observed.      labs    Lab Results (last 24 hours)     Procedure Component Value Units Date/Time    Lactic Acid, Plasma [[id number]]  (Abnormal) Collected:  10/18/19 1254    Specimen:  Blood Updated:  10/18/19 1350     Lactate 2.1 mmol/L     POC Glucose Once [[id number]]  (Abnormal) Collected:  10/18/19 1209    Specimen:  Blood Updated:  10/18/19 1216     Glucose 149 mg/dL      Comment: Serial Number: [id number]:  [id number]       Blood Culture - Blood, Arm, Right [[id number]]  (Abnormal) Collected:  10/14/19 1647    Specimen:  Blood from Arm, Right Updated:  10/18/19 0909     Blood Culture Corynebacterium species     Comment: Probable contaminant requires clinical correlation, susceptibility not performed unless requested by physician.            Isolated from Aerobic Bottle     Gram Stain Aerobic Bottle Gram positive bacilli resembling diphtheroids    Narrative:       Blood culture does not meet the specified criteria for PCR identification.  If pregnant, immunocompromised, or clinical concern for meningitis, call lab to run BCID for Listeria monocytogenes.    POC Glucose Once [[id number]]  (Abnormal) Collected:  10/18/19 0726    Specimen:  Blood Updated:  10/18/19 0729     Glucose 150 mg/dL      Comment: Serial Number: [id number]:  [id number]       Blood Gas, Arterial [[id number]]  (Abnormal) Collected:  10/18/19 0510    Specimen:  Arterial Blood Updated:  10/18/19 0534     Site Left Radial     John's Test Positive     pH, Arterial 7.196 pH units      pCO2, Arterial 66.1 mm Hg      pO2, Arterial 79.5 mm Hg      HCO3, Arterial 25.6 mmol/L      Base Excess, Arterial -3.8 mmol/L      Comment: Serial Number: [id number]:  [id number]        O2 Saturation, Arterial 91.9 %      CO2 Content 27.6 mmol/L      Barometric Pressure for Blood Gas --     Comment: N/A         Modality Vapotherm     FIO2 100 %      Hemodilution Yes    POC Lactate [[id number]]  (Abnormal) Collected:  10/18/19 0510    Specimen:  Blood Updated:  10/18/19 0533     Lactate 7.0 mmol/L      Comment: Serial Number: [id number]:  [id number]       Phosphorus [[id number]]  (Abnormal) Collected:  10/18/19 0356    Specimen:  Blood Updated:  10/18/19 0507     Phosphorus 5.4 mg/dL     Basic Metabolic Panel [[id number]]  (Abnormal) Collected:  10/18/19 0356    Specimen:  Blood Updated:  10/18/19 0506     Glucose 170 mg/dL      BUN 39 mg/dL      Creatinine 1.70 mg/dL      Sodium 137 mmol/L      Potassium 4.2 mmol/L      Chloride 99 mmol/L      CO2 21.0 mmol/L      Calcium 7.8 mg/dL      eGFR Non African Amer 28 mL/min/1.73      BUN/Creatinine Ratio 22.9     Anion Gap 17.0 mmol/L     Narrative:       GFR Normal >60  Chronic Kidney Disease <60  Kidney Failure <15    Magnesium [[id number]]  (Normal) Collected:  10/18/19 0356    Specimen:  Blood Updated:  10/18/19 0506     Magnesium 2.1 mg/dL     BNP [[id number]]  (Abnormal) Collected:  10/18/19 0357    Specimen:  Blood Updated:  10/18/19 0459     proBNP 26,409.0 pg/mL     Narrative:       Among patients with dyspnea, NT-proBNP is highly sensitive for the detection of acute congestive heart failure. In addition NT-proBNP of <300 pg/ml effectively rules out acute congestive heart failure with 99% negative predictive value.    Lactic Acid, Plasma [[id number]]  (Abnormal) Collected:  10/18/19 0356    Specimen:  Blood Updated:  10/18/19 0451     Lactate 5.9 mmol/L     CBC & Differential [[id number]] Collected:  10/18/19 0356    Specimen:  Blood Updated:  10/18/19 0420    Narrative:       The following orders were created for panel order CBC & Differential.  Procedure                               Abnormality         Status                     ---------                               -----------         ------                     CBC Auto Differential[[id number]]        Abnormal             Final result                 Please view results for these tests on the individual orders.    CBC Auto Differential [[id number]]  (Abnormal) Collected:  10/18/19 0356    Specimen:  Blood Updated:  10/18/19 0420     WBC 13.90 10*3/mm3      RBC 3.74 10*6/mm3      Hemoglobin 11.7 g/dL      Hematocrit 37.7 %      .0 fL      Comment: Result checked         MCH 31.4 pg      MCHC 31.1 g/dL      RDW 18.1 %      RDW-SD 64.3 fl      MPV 8.1 fL      Platelets 139 10*3/mm3      Neutrophil % 91.4 %      Lymphocyte % 2.1 %      Monocyte % 6.3 %      Eosinophil % 0.1 %      Basophil % 0.1 %      Neutrophils, Absolute 12.70 10*3/mm3      Lymphocytes, Absolute 0.30 10*3/mm3      Monocytes, Absolute 0.90 10*3/mm3      Eosinophils, Absolute 0.00 10*3/mm3      Basophils, Absolute 0.00 10*3/mm3      nRBC 0.8 /100 WBC     Protime-INR [[id number]]  (Abnormal) Collected:  10/18/19 0356    Specimen:  Blood Updated:  10/18/19 0415     Protime 15.6 Seconds      INR 1.60    POC Glucose Once [[id number]]  (Abnormal) Collected:  10/17/19 2040    Specimen:  Blood Updated:  10/17/19 2041     Glucose 149 mg/dL      Comment: Serial Number: [id number]:  [id number]       Blood Culture - Blood, Blood, Venous Line [[id number]] Collected:  10/14/19 1716    Specimen:  Blood, Venous Line Updated:  10/17/19 1730     Blood Culture No growth at 3 days    Basic Metabolic Panel [[id number]]  (Abnormal) Collected:  10/17/19 1653    Specimen:  Blood Updated:  10/17/19 1726     Glucose 169 mg/dL      BUN 40 mg/dL      Creatinine 1.70 mg/dL      Sodium 139 mmol/L      Potassium 4.1 mmol/L      Chloride 99 mmol/L      CO2 28.0 mmol/L      Calcium 7.5 mg/dL      eGFR Non African Amer 28 mL/min/1.73      BUN/Creatinine Ratio 23.5     Anion Gap 12.0 mmol/L     Narrative:       GFR Normal >60  Chronic Kidney Disease <60  Kidney Failure <15          Imaging & Other Studies    Imaging Results (last 72 hours)     Procedure Component Value Units Date/Time    CT  Abdomen Pelvis Without Contrast [[id number]] Collected:  10/14/19 1912     Updated:  10/14/19 1925    Narrative:          DATE OF EXAM:  10/14/2019 6:53 PM     PROCEDURE:  CT ABDOMEN PELVIS WO CONTRAST-     INDICATIONS:  pain; A41.9-Sepsis, unspecified organism; R65.21-Severe sepsis with  septic shock     COMPARISON:  CT scan of the abdomen and pelvis 02/16/2019     TECHNIQUE:  Routine transaxial slices were obtained through the abdomen and pelvis  without the administration of intravenous contrast. Reconstructed  coronal and sagittal images were also obtained. Automated exposure  control and iterative construction methods were used.     FINDINGS:  There are moderate to large bilateral pleural effusions with associated  bibasilar dependent subsegmental atelectasis which are worsened from the  previous CT scan. There is a transvenous pacemaker. The liver and spleen  and adrenal glands and pancreas and kidneys appear normal. There is free  fluid deep in the pelvis unusual for a patient of this age. The patient  appears to have had a subtotal colectomy with only the rectum and distal  most aspect of the sigmoid colon remaining. There are scattered small  bowel air-fluid levels in nondistended loops. The small bowel within the  subcutaneous tissues passing from the abdominal wall to the skin does  show some redundancy however there is no significant small bowel  distention to suggest obstruction. There is mild diffuse increased soft  tissue stranding in the subcutaneous fat and mesenteric fat. There are  degenerative changes of the lumbar spine.       Impression:          1. Subtotal colectomy. There is an ileostomy in the right lower  quadrant. The subcutaneous portion of the ileal loop is slightly  redundant however there is no small bowel distention to suggest  obstruction. There are some scattered small bowel air-fluid levels in  nondistended loops which could reflect an associated mild ileus or  enteritis.  2.  Moderate to large bilateral pleural effusions and dependent bibasilar  atelectasis. In addition there is a mild amount of ascites deep in the  pelvis and there is diffuse increased soft tissue stranding in the  subcutaneous fat and mesenteric fat. The constellation of these findings  does raise concern for changes of congestive heart failure.  3. Incidental note is made of a 3 cm right lateral hernia containing  mesenteric fat.     Electronically Signed ByIsrael Carpenter On:10/14/2019 7:16 PM  This report was finalized on [id number] by  Ulysses Carpenter, .    XR Chest 1 View [[id number]] Collected:  10/14/19 1902     Updated:  10/14/19 1905    Narrative:       DATE OF EXAM:  10/14/2019 6:45 PM     PROCEDURE:  XR CHEST 1 VW-     INDICATIONS:  picc tip verification     COMPARISON: Chest x-ray 10/14/2019 at 5:00 PM     TECHNIQUE:   Single radiographic AP view of the chest was obtained.     FINDINGS:  There is been placement of a right PICC line with the tip in the  superior vena cava. There is cardiomegaly with a transvenous pacemaker.  There are extensive bilateral alveolar infiltrates and pleural effusions  with pulmonary vascular congestion which have worsened from the previous  study.        Impression:       Under  1. PICC line in good position with the tip in the superior vena cava.  2. Findings suggesting changes of pulmonary edema and congestive failure  which are worsened from the previous study.     Electronically Signed ByIsrael Carpenter On:10/14/2019 7:03 PM  This report was finalized on [id number] by  Ulysses Carpenter, .    XR Chest 1 View [[id number]] Collected:  10/14/19 1713     Updated:  10/14/19 1716    Narrative:       DATE OF EXAM:  10/14/2019 5:00 PM     PROCEDURE:  XR CHEST 1 VW-     INDICATIONS:  soa     COMPARISON:  Chest x-ray 02/11/2019     TECHNIQUE:   Single radiographic AP view of the chest was obtained.     FINDINGS:  There is cardiomegaly with a transvenous pacemaker. There is  no  significant pulmonary vascular congestion however there are extensive  bibasilar areas of infiltrate and consolidation and effusion. There is a  possible PICC line on the right with the tip in the right axillary vein.          Impression:       Renomegaly. Basilar infiltrates and effusions. The appearance is  somewhat more suggestive of changes of pulmonary edema and congestive  failure. An underlying pneumonia cannot be excluded.     Electronically Signed By-Ulysses Carpenter On:10/14/2019 5:14 PM  This report was finalized on [id number] by  Ulysses Carpenter, .          Assessment/plan  Septic shock likely related to urosepsis with hx of ESBL E. Coli UTI  Acute hypoxic respiratory failure  Leukocytosis   Bilateral pleural effusions   Hyperkalemia  LEYLA  Atrial fib with RVR  Acute hypoxic respiratory failure  H/o Hypertension  H/o LLE DVT and PE  New onset CHF  Chronic anticoagulation with Eliquis  ?Ileus  H/o ileostomy r/t C. Diff  NSTEMI    Plan:    -cont Merrem (hx of ESBL UTI).  D/C Vanco  -currently on 7L, cont to wean for sats 94%, AVAP noct and prn  -CXR with pulmonary edema  -BNP 26,409  -ABGs reviewed   -on AMIO gtt per cards  -add Metoprolol 25 mg BID  -Blood pressure stable off pressors  -resp viral panel negative   -Urine cx + Ecoli   -blood cx negative   -TTE reviewed, EF 60%  -troponin 0.43  -lactic acid initially normalized, 7 this morning, now 2.1  -replace electrolytes as needed   -IVF bolus of 1449 in the ER  -Insulin, calcium, D50 and bicarb for hyperkalemia, K+ 4.3 given at admission  -Nephrology consulted  -OFF HCO3 gtt  -IVFs discontinued  -resume Eliquis   -Renal ultrasound negative  -Cardiology consulted   -Diureses per renal  -passed swallow eval, diet ordered   -PT eval    PICC    PUD: Not indicated, tolerating meals  Insulin:  Sliding scale  VTE:  SCDs, Eliquis   Nutrition: Diet ordered     DNR

## 2019-10-18 NOTE — PROGRESS NOTES
Continued Stay Note  LULY Alvarado     Patient Name: Dinora Vásquez  MRN: [id number]  Today's Date: 10/18/2019    Admit Date: 10/14/2019    Discharge Plan     Row Name 10/18/19 1431       Plan    Plan Comments  DC Barriers: Precert pending to [facility], continues on amiodarone gtt.        Lakisha Mcneil RN       Office Phone: [phone]  Office Cell: [phone]

## 2019-10-18 NOTE — PROGRESS NOTES
"                                                                                                                                      Nephrology  Progress Note                                        [facility]    Patient Identification    Name: Dinora Vásquez  Age: 89 y.o.  Sex: female  :  1926  MRN: [id number]      DATE OF SERVICE:  10/18/2019        Subective    Had soa last night     Objective   Scheduled Meds:    apixaban 2.5 mg Oral Q12H   dexamethasone 1 drop Both Eyes Q8H   insulin lispro 0-7 Units Subcutaneous 4x Daily With Meals & Nightly   levothyroxine 75 mcg Oral QAM AC   meropenem 500 mg Intravenous Q24H   metoprolol tartrate 25 mg Oral Q12H   mirtazapine 7.5 mg Oral Nightly   polyethyl glycol-propyl glycol 1 drop Both Eyes BID   potassium chloride 20 mEq Oral Daily   prenatal vitamin 27-0.8 1 tablet Oral Daily   sertraline 25 mg Oral Daily   sodium chloride 10 mL Intravenous Q12H         Continuous Infusions:    amiodarone 0.5 mg/min Last Rate: 0.5 mg/min (10/17/19 1449)   Pharmacy to Dose meropenem (MERREM)     sodium chloride 75 mL/hr Last Rate: 75 mL/hr (10/17/19 1700)       PRN Meds:dextrose  •  dextrose  •  glucagon (human recombinant)  •  metoprolol tartrate  •  ondansetron  •  Pharmacy to Dose meropenem (MERREM)  •  sodium chloride  •  sodium chloride     Exam:  /63 (Patient Position: Lying)   Pulse 107   Temp 95.7 °F (35.4 °C) (Rectal) Comment: RN (rico) notified  Resp 18   Ht 160 cm (63\")   Wt 60 kg (132 lb 4.4 oz)   SpO2 100%   BMI 23.43 kg/m²     Intake/Output last 3 shifts:  I/O last 3 completed shifts:  In:  [P.O.:360; I.V.:1459]  Out: 300 [Urine:300]    Intake/Output this shift:  No intake/output data recorded.    Physical exam:  On o2  PERTL  No JVD  Chest: decreased BS occasional ronchi  CVS Regular rate and rhythm, S1 and S2 normal  Abdomen:  Soft, non-tender, bowel sounds active   LE: trace edema  Skin:  No rashes or lesions       Data " Review:  All labs (24hrs):   Recent Results (from the past 24 hour(s))   POC Glucose Once    Collection Time: 10/17/19 11:35 AM   Result Value Ref Range    Glucose 149 (H) 70 - 105 mg/dL   Basic Metabolic Panel    Collection Time: 10/17/19  4:53 PM   Result Value Ref Range    Glucose 169 (H) 65 - 99 mg/dL    BUN 40 (H) 8 - 23 mg/dL    Creatinine 1.70 (H) 0.57 - 1.00 mg/dL    Sodium 139 136 - 145 mmol/L    Potassium 4.1 3.5 - 5.2 mmol/L    Chloride 99 98 - 107 mmol/L    CO2 28.0 22.0 - 29.0 mmol/L    Calcium 7.5 (L) 8.2 - 9.6 mg/dL    eGFR Non African Amer 28 (L) >60 mL/min/1.73    BUN/Creatinine Ratio 23.5 7.0 - 25.0    Anion Gap 12.0 5.0 - 15.0 mmol/L   POC Glucose Once    Collection Time: 10/17/19  8:40 PM   Result Value Ref Range    Glucose 149 (H) 70 - 105 mg/dL   Protime-INR    Collection Time: 10/18/19  3:56 AM   Result Value Ref Range    Protime 15.6 (H) 9.6 - 11.7 Seconds    INR 1.60 (H) 0.90 - 1.10   Basic Metabolic Panel    Collection Time: 10/18/19  3:56 AM   Result Value Ref Range    Glucose 170 (H) 65 - 99 mg/dL    BUN 39 (H) 8 - 23 mg/dL    Creatinine 1.70 (H) 0.57 - 1.00 mg/dL    Sodium 137 136 - 145 mmol/L    Potassium 4.2 3.5 - 5.2 mmol/L    Chloride 99 98 - 107 mmol/L    CO2 21.0 (L) 22.0 - 29.0 mmol/L    Calcium 7.8 (L) 8.2 - 9.6 mg/dL    eGFR Non African Amer 28 (L) >60 mL/min/1.73    BUN/Creatinine Ratio 22.9 7.0 - 25.0    Anion Gap 17.0 (H) 5.0 - 15.0 mmol/L   Magnesium    Collection Time: 10/18/19  3:56 AM   Result Value Ref Range    Magnesium 2.1 1.7 - 2.3 mg/dL   Phosphorus    Collection Time: 10/18/19  3:56 AM   Result Value Ref Range    Phosphorus 5.4 (H) 2.5 - 4.5 mg/dL   Lactic Acid, Plasma    Collection Time: 10/18/19  3:56 AM   Result Value Ref Range    Lactate 5.9 (C) 0.5 - 2.0 mmol/L   CBC Auto Differential    Collection Time: 10/18/19  3:56 AM   Result Value Ref Range    WBC 13.90 (H) 3.40 - 10.80 10*3/mm3    RBC 3.74 (L) 3.77 - 5.28 10*6/mm3    Hemoglobin 11.7 (L) 12.0 - 15.9 g/dL     Hematocrit 37.7 34.0 - 46.6 %    .0 (H) 79.0 - 97.0 fL    MCH 31.4 26.6 - 33.0 pg    MCHC 31.1 (L) 31.5 - 35.7 g/dL    RDW 18.1 (H) 12.3 - 15.4 %    RDW-SD 64.3 (H) 37.0 - 54.0 fl    MPV 8.1 6.0 - 12.0 fL    Platelets 139 (L) 140 - 450 10*3/mm3    Neutrophil % 91.4 (H) 42.7 - 76.0 %    Lymphocyte % 2.1 (L) 19.6 - 45.3 %    Monocyte % 6.3 5.0 - 12.0 %    Eosinophil % 0.1 (L) 0.3 - 6.2 %    Basophil % 0.1 0.0 - 1.5 %    Neutrophils, Absolute 12.70 (H) 1.70 - 7.00 10*3/mm3    Lymphocytes, Absolute 0.30 (L) 0.70 - 3.10 10*3/mm3    Monocytes, Absolute 0.90 0.10 - 0.90 10*3/mm3    Eosinophils, Absolute 0.00 0.00 - 0.40 10*3/mm3    Basophils, Absolute 0.00 0.00 - 0.20 10*3/mm3    nRBC 0.8 (H) 0.0 - 0.2 /100 WBC   BNP    Collection Time: 10/18/19  3:57 AM   Result Value Ref Range    proBNP 26,409.0 (H) 5.0-1,800.0 pg/mL   Blood Gas, Arterial    Collection Time: 10/18/19  5:10 AM   Result Value Ref Range    Site Left Radial     John's Test Positive     pH, Arterial 7.196 (C) 7.350 - 7.450 pH units    pCO2, Arterial 66.1 (H) 35.0 - 48.0 mm Hg    pO2, Arterial 79.5 (L) 83.0 - 108.0 mm Hg    HCO3, Arterial 25.6 21.0 - 28.0 mmol/L    Base Excess, Arterial -3.8 (L) 0.0 - 3.0 mmol/L    O2 Saturation, Arterial 91.9 (L) 94.0 - 98.0 %    CO2 Content 27.6 22 - 29 mmol/L    Barometric Pressure for Blood Gas      Modality Vapotherm     FIO2 100 %    Hemodilution Yes    POC Lactate    Collection Time: 10/18/19  5:10 AM   Result Value Ref Range    Lactate 7.0 (C) 0.5 - 2.0 mmol/L   POC Glucose Once    Collection Time: 10/18/19  7:26 AM   Result Value Ref Range    Glucose 150 (H) 70 - 105 mg/dL          Imaging:  [unfilled]    Assessment/Plan:     Atrial fibrillation with RVR (CMS/HCC)    Acute on chronic renal failure (CMS/HCC)    Elevated brain natriuretic peptide (BNP) level    Elevated troponin     Acute kidney injury on  CKD stage III baseline cr 1.4              -Creatinine stable at 1.7    -off ivfs   - labs am  S/p pulm  edema off ivfs, ok to diures  Lactic acidosis  Hyponatremia on half-normal saline  Hypocalcemia replace  Acute hypoxic respiratory failure  Septic shock  Urosepsis  Leukocytosis  Pleural effusions  Status post hyperkalemia  Atrial fibrillation with rapid ventricular response  History of hypertension  History of lower extremity DVT and PE  New onset congestive heart failure  Abdominal pain with possible ileus  Non-ST elevation MI

## 2019-10-18 NOTE — PROGRESS NOTES
Continued Stay Note   Christiano     Patient Name: Dinora Vásquez  MRN: [id number]  Today's Date: 10/18/2019    Admit Date: 10/14/2019    Discharge Plan     Row Name 10/18/19 1632       Plan    Plan  DC Plan: Return to [facility] at d/c - pending precert. Precert started 10/17. No PASRR needed. Pt came from LTC private pay but attempting to return skilled.     Plan Comments  SW spoke with facility liaison who informed insurance was requested updated therapy notes. Reached out to PT, who stated PT was declined today d/t decline medically. Requested pt be added to list to be seen over the weekend. Liaison updated.      STORMY Lemus    Phone: [phone]  Cell: [phone]  Fax: [phone]  [email or web address]

## 2019-10-18 NOTE — PROGRESS NOTES
"    Reason for follow-up: Atrial fibrillation with rapid ventricular rate  Status post permanent pacemaker placement  Sepsis     Patient Care Team:  Florentino Kimbrough MD as PCP - General (Family Medicine)    Subjective .   Feels okay     Review of Systems   Constitution: Positive for malaise/fatigue.       Cephalexin; Sulfadiazine; Trimethoprim; and Trospium    Scheduled Meds:    apixaban 2.5 mg Oral Q12H   dexamethasone 1 drop Both Eyes Q8H   insulin lispro 0-7 Units Subcutaneous 4x Daily With Meals & Nightly   levothyroxine 75 mcg Oral QAM AC   meropenem 500 mg Intravenous Q12H   metoprolol tartrate 50 mg Oral Q12H   mirtazapine 7.5 mg Oral Nightly   polyethyl glycol-propyl glycol 1 drop Both Eyes BID   potassium chloride 20 mEq Oral Daily   prenatal vitamin 27-0.8 1 tablet Oral Daily   sertraline 25 mg Oral Daily   sodium chloride 10 mL Intravenous Q12H     Continuous Infusions:    Pharmacy to Dose meropenem (MERREM)      PRN Meds:.dextrose  •  dextrose  •  glucagon (human recombinant)  •  metoprolol tartrate  •  ondansetron  •  Pharmacy to Dose meropenem (MERREM)  •  sodium chloride  •  sodium chloride    Objective   Looks comfortable lying in the bed    VITAL SIGNS  Vitals:    10/18/19 1040 10/18/19 1205 10/18/19 1500 10/18/19 1511   BP: 109/44   129/72   BP Location: Left arm   Left arm   Patient Position: Lying   Lying   Pulse: 96  89 87   Resp: 28   20   Temp: 98 °F (36.7 °C) 97.4 °F (36.3 °C)  97.8 °F (36.6 °C)   TempSrc: Rectal Oral  Oral   SpO2: 100%  93% 97%   Weight:       Height:           Flowsheet Rows      First Filed Value   Admission Height  160 cm (63\") Documented at 10/14/2019 1551   Admission Weight  48.3 kg (106 lb 7.7 oz) Documented at 10/14/2019 1551           TELEMETRY: Atrial fibrillation with rapid ventricular rate    Physical Exam:  Physical Exam   Constitutional: She is oriented to person, place, and time. She appears well-developed and well-nourished. She is cooperative.   HENT:   Head: " Normocephalic and atraumatic.   Mouth/Throat: Uvula is midline and oropharynx is clear and moist. No oral lesions.   Eyes: Conjunctivae are normal. No scleral icterus.   Neck: Trachea normal. Neck supple. No JVD present. Carotid bruit is not present. No thyromegaly present.   Cardiovascular: Normal rate, S1 normal, S2 normal, normal heart sounds, intact distal pulses and normal pulses. An irregularly irregular rhythm present. PMI is not displaced. Exam reveals no gallop and no friction rub.   No murmur heard.  Pulmonary/Chest: Effort normal and breath sounds normal.   Abdominal: Soft. Bowel sounds are normal.   Musculoskeletal: Normal range of motion.   Neurological: She is alert and oriented to person, place, and time. She has normal strength.   No focal deficits   Skin: Skin is warm. No cyanosis.   Psychiatric: She has a normal mood and affect.                LAB RESULTS (LAST 7 DAYS)    CBC  Results from last 7 days   Lab Units 10/18/19  0356 10/17/19  0405 10/16/19  0400 10/15/19  0424 10/15/19  0034 10/14/19  1620   WBC 10*3/mm3 13.90* 13.10* 20.50* 24.90* 21.70* 18.80*   RBC 10*6/mm3 3.74* 3.57* 3.59* 3.72* 3.55* 4.29   HEMOGLOBIN g/dL 11.7* 11.0* 11.0* 11.2* 11.0* 13.1   HEMATOCRIT % 37.7 34.7 34.2 36.4 35.1 44.9   MCV fL 101.0* 97.0 95.2 97.9* 98.9* 104.8*   PLATELETS 10*3/mm3 139* 116* 160 185 187 210       BMP  Results from last 7 days   Lab Units 10/18/19  0356 10/17/19  1653 10/17/19  0405 10/16/19  0400 10/15/19  1121 10/15/19  0424 10/15/19  0029  10/14/19  1619   SODIUM mmol/L 137 139 142 148* 146* 147* 148*   < > 142   POTASSIUM mmol/L 4.2 4.1 3.8 3.6 4.2 4.6 4.3   < > 5.2*   CHLORIDE mmol/L 99 99 100 105 104 107 109   < > 103   CO2 mmol/L 21.0* 28.0 31.0* 33.0* 23.0 20.0* 15.0*   < > 12.0*   BUN mg/dL 39* 40* 48* 63* 68* 69* 67*   < > 71*   CREATININE mg/dL 1.70* 1.70* 2.05* 2.65* 3.18* 3.50* 3.70*   < > 4.00*   GLUCOSE mg/dL 170* 169* 146* 162* 213* 161* 101*   < > 152*   MAGNESIUM mg/dL 2.1  --   2.0 2.4*  --  2.8*  --   --  3.4*   PHOSPHORUS mg/dL 5.4*  --  2.8 3.1  --  4.3  --   --  6.5*    < > = values in this interval not displayed.       CMP   Results from last 7 days   Lab Units 10/18/19  0356 10/17/19  1653 10/17/19  0405 10/16/19  0400 10/15/19  1121 10/15/19  0424 10/15/19  0029 10/14/19  1647 10/14/19  1619   SODIUM mmol/L 137 139 142 148* 146* 147* 148* 144 142   POTASSIUM mmol/L 4.2 4.1 3.8 3.6 4.2 4.6 4.3 5.2* 5.2*   CHLORIDE mmol/L 99 99 100 105 104 107 109 102 103   CO2 mmol/L 21.0* 28.0 31.0* 33.0* 23.0 20.0* 15.0* 14.0* 12.0*   BUN mg/dL 39* 40* 48* 63* 68* 69* 67* 71* 71*   CREATININE mg/dL 1.70* 1.70* 2.05* 2.65* 3.18* 3.50* 3.70* 3.80* 4.00*   GLUCOSE mg/dL 170* 169* 146* 162* 213* 161* 101* 142* 152*   ALBUMIN g/dL  --   --   --   --  3.00*  --   --  3.50 3.50   BILIRUBIN mg/dL  --   --   --   --  1.4*  --   --  2.3* 2.1*   ALK PHOS U/L  --   --   --   --  94  --   --  97* 97*   AST (SGOT) U/L  --   --   --   --  78*  --   --  51* 51*   ALT (SGPT) U/L  --   --   --   --  27  --   --  <5* <5*         BNP  Results from last 7 days   Lab Units 10/14/19  1619   BNP pg/mL 1,784.0*       TROPONIN  Results from last 7 days   Lab Units 10/15/19  1121 10/15/19  0424   TROPONIN I ng/mL  --  0.430*   TROPONIN T ng/mL 0.134*  --        CoAg  Results from last 7 days   Lab Units 10/18/19  0356 10/16/19  0400 10/15/19  0423 10/14/19  1619   INR  1.60* 2.10* 3.14* 2.44*   APTT seconds  --  24.7  --  26.2       Creatinine Clearance  Estimated Creatinine Clearance: 20.1 mL/min (A) (by C-G formula based on SCr of 1.7 mg/dL (H)).    ABG  Results from last 7 days   Lab Units 10/18/19  1441 10/18/19  0510 10/15/19  0426 10/14/19  2358 10/14/19  1646   PH, ARTERIAL pH units 7.446 7.196* 7.383 7.151* 7.185*   PCO2, ARTERIAL mm Hg 47.0 66.1* 32.0* 22.2* 31.9*   PO2 ART mm Hg 101.7 79.5* 93.3 91.2 19.5*   O2 SATURATION ART % 98.0 91.9* 97.2 94.4 21.8*   BASE EXCESS ART mmol/L 7.1* -3.8* -5.1* -19.3* -15.1*        Radiology  Xr Chest 1 View    Result Date: 10/18/2019  1.  Markedly increased basilar consolidation and pleural fluid, likely edema. 2.  Right PICC line tip is in the SVC. Electronically signed by:  Jw Weiner M.D.  10/18/2019 3:25 AM            EKG        I personally viewed and interpreted the patient's EKG/Telemetry data:    ECHOCARDIOGRAM:    Results for orders placed during the hospital encounter of 10/14/19   Adult Transthoracic Echo Complete W/ Cont if Necessary Per Protocol    Narrative · Estimated EF = 60%.  · Left ventricular systolic function is normal.  · Right ventricular cavity is moderately dilated.  · Left atrial cavity size is mild-to-moderately dilated.  · There is calcification of the aortic valve.  · Mild-to-moderate mitral valve regurgitation is present  · Mild tricuspid valve regurgitation is present.        STRESS MYOVIEW:    CARDIAC CATHETERIZATION:    OTHER:         Assessment/Plan       Atrial fibrillation with RVR (CMS/HCC)    Acute on chronic renal failure (CMS/HCC)    Elevated brain natriuretic peptide (BNP) level    Elevated troponin      Continue antibiotics per primary team  Heart rate is much better we will stop the IV amiodarone increase the beta-blocker p.o.  Acute on chronic renal failure per primary team creatinine is better  Elevated BNP caution with diuresis  Elevated troponin probably demand ischemia continue conservative treatment  Elevated INR probably from sepsis will recheck tomorrow    I discussed the patients findings and my recommendations with patient and attending nurse    Royal Palacios MD  10/18/19  5:10 PM

## 2019-10-18 NOTE — PROGRESS NOTES
[facility] Medicine Services Daily Progress Note      Hospitalist Team  LOS 4 days      Patient Care Team:  Florentino Kimbrough MD as PCP - General (Family Medicine)        Chief Complaint / Subjective  Chief Complaint   Patient presents with   • Abnormal Lab     Patient with fast team called overnight.  Patient noted to be in respiratory distress with elevated lactate.  Patient with a pH of 7.1 with elevated CO2 and hypoxia and acute decompensation.  Patient placed on AVAPS.  Lactate of 5.9 and up to 7.  Patient now given dose of IV Lasix.  Patient reports improvement with positive pressure ventilation.    Brief Synopsis of Hospital Course/HPI          Review of systems negative for all other 10 systems    Review of Systems   Constitution: Negative for chills and fever.   HENT: Negative for congestion and sore throat.    Cardiovascular: Negative for chest pain and palpitations.   Respiratory: Positive for shortness of breath. Negative for hemoptysis.    Gastrointestinal: Negative for nausea and vomiting.   Neurological: Negative for focal weakness and headaches.   Psychiatric/Behavioral: Negative for hallucinations. The patient does not have insomnia.        Family History   Problem Relation Age of Onset   • Heart disease Mother    • Diabetes Mother    • Heart disease Sister    • Diabetes Sister        Past Medical History:   Diagnosis Date   • Ankle wound, right, initial encounter    • Aortic stenosis    • Atrial fibrillation (CMS/HCC)    • Coronary artery disease    • DVT (deep venous thrombosis) (CMS/HCC)    • Hypothyroidism    • Left bundle branch block    • Pulmonary embolus (CMS/HCC)    • Pulmonary hypertension (CMS/HCC)    • Sinus bradycardia        Social History     Socioeconomic History   • Marital status:      Spouse name: Not on file   • Number of children: Not on file   • Years of education: Not on file   • Highest education level: Not on file   Occupational History   •  "Occupation: Retired   Tobacco Use   • Smoking status: Never Smoker   • Smokeless tobacco: Never Used   Substance and Sexual Activity   • Alcohol use: No     Frequency: Never   • Drug use: No           Objective      Vital Signs  Temp:  [95.1 °F (35.1 °C)-98.2 °F (36.8 °C)] 97.4 °F (36.3 °C)  Heart Rate:  [] 96  Resp:  [17-28] 28  BP: (109-165)/(44-89) 109/44  Oxygen Therapy  SpO2: 100 %  Pulse Oximetry Type: Continuous  Device (Oxygen Therapy): NPPV/NIV  $ High Flow Nasal Cannula Set-Up: yes  Flow (L/min): 40  Oxygen Concentration (%): 60  Flowsheet Rows      First Filed Value   Admission Height  160 cm (63\") Documented at 10/14/2019 1551   Admission Weight  48.3 kg (106 lb 7.7 oz) Documented at 10/14/2019 1551        Intake & Output (last 3 days)       10/15 0701 - 10/16 0700 10/16 0701 - 10/17 0700 10/17 0701 - 10/18 0700 10/18 0701 - 10/19 0700    P.O.  360 240     I.V. (mL/kg) 3725 (61.2) 2293 (38.2)      IV Piggyback        Total Intake(mL/kg) 3725 (61.2) 2653 (44.2) 240 (3.9)     Urine (mL/kg/hr) 650 (0.4) 565 (0.4) 100 (0.1)     Stool 100  100     Total Output 750 565 200     Net +2975 +2088 +40             Urine Unmeasured Occurrence  2 x 2 x         Lines, Drains & Airways    Active LDAs     Name:   Placement date:   Placement time:   Site:   Days:    PICC Triple Lumen 10/14/19 Right Basilic   10/14/19    1828    Basilic   3    Ileostomy RLQ   10/14/19    2000    RLQ   3    External Urinary Catheter   10/16/19    0900    --   2                  Physical Exam:     General: Elderly female lying in bed with facemask on and getting positive pressure ventilation  HEENT: NC/AT, EOMI, mucosa moist  Heart: Irregular.  No gallops  Chest: Increased work of breathing, unable to appreciate wheezes some crackles noted  Abdominal: Soft. NT/ND.  Musculoskeletal: Normal ROM.  No cyanosis no calf tenderness.  Neurological: AAOx3, no focal deficits  Skin: Skin is warm and dry. No rash  Psychiatric: Normal mood and " affect.  Patient is very hard of hearing.    Procedures:              Results Review:     I reviewed the patient's new clinical results.  reviewed    Results from last 7 days   Lab Units 10/18/19  0356 10/17/19  0405 10/16/19  0400 10/15/19  0424   WBC 10*3/mm3 13.90* 13.10* 20.50* 24.90*   HEMOGLOBIN g/dL 11.7* 11.0* 11.0* 11.2*   HEMATOCRIT % 37.7 34.7 34.2 36.4   PLATELETS 10*3/mm3 139* 116* 160 185   MONOCYTES % %  --   --  7.0 6.0     Results from last 7 days   Lab Units 10/18/19  0356 10/17/19  1653 10/17/19  0405  10/15/19  1121  10/14/19  1647 10/14/19  1619   SODIUM mmol/L 137 139 142   < > 146*   < > 144 142   POTASSIUM mmol/L 4.2 4.1 3.8   < > 4.2   < > 5.2* 5.2*   CHLORIDE mmol/L 99 99 100   < > 104   < > 102 103   CO2 mmol/L 21.0* 28.0 31.0*   < > 23.0   < > 14.0* 12.0*   BUN mg/dL 39* 40* 48*   < > 68*   < > 71* 71*   CREATININE mg/dL 1.70* 1.70* 2.05*   < > 3.18*   < > 3.80* 4.00*   CALCIUM mg/dL 7.8* 7.5* 7.5*   < > 7.3*   < > 8.2* 8.0*   BILIRUBIN mg/dL  --   --   --   --  1.4*  --  2.3* 2.1*   ALK PHOS U/L  --   --   --   --  94  --  97* 97*   ALT (SGPT) U/L  --   --   --   --  27  --  <5* <5*   AST (SGOT) U/L  --   --   --   --  78*  --  51* 51*   GLUCOSE mg/dL 170* 169* 146*   < > 213*   < > 142* 152*    < > = values in this interval not displayed.     Results from last 7 days   Lab Units 10/18/19  0356 10/17/19  0405 10/16/19  0400   MAGNESIUM mg/dL 2.1 2.0 2.4*     Lab Results   Component Value Date    CALCIUM 7.8 (L) 10/18/2019    PHOS 5.4 (H) 10/18/2019     No results found for: HGBA1C  Results from last 7 days   Lab Units 10/18/19  0356 10/16/19  0400 10/15/19  0423   INR  1.60* 2.10* 3.14*       Results from last 7 days   Lab Units 10/18/19  0510   PH, ARTERIAL pH units 7.196*   PO2 ART mm Hg 79.5*   PCO2, ARTERIAL mm Hg 66.1*   HCO3 ART mmol/L 25.6         Microbiology Results (last 10 days)     Procedure Component Value - Date/Time    MRSA Screen Culture - Swab, Nares [[id number]]   (Normal) Collected:  10/14/19 2036    Lab Status:  Final result Specimen:  Swab from Nares Updated:  10/15/19 2036     MRSA SCREEN CX No Methicillin Resistant Staphylococcus aureus isolated    Respiratory Panel, PCR - Swab, Nasopharynx [[id number]]  (Normal) Collected:  10/14/19 1951    Lab Status:  Final result Specimen:  Swab from Nasopharynx Updated:  10/14/19 2236     ADENOVIRUS, PCR Not Detected     Coronavirus 229E Not Detected     Coronavirus HKU1 Not Detected     Coronavirus NL63 Not Detected     Coronavirus OC43 Not Detected     Human Metapneumovirus Not Detected     Human Rhinovirus/Enterovirus Not Detected     Influenza B PCR Not Detected     Parainfluenza Virus 1 Not Detected     Parainfluenza Virus 2 Not Detected     Parainfluenza Virus 3 Not Detected     Parainfluenza Virus 4 Not Detected     Bordetella pertussis pcr Not Detected     Influenza A H1 2009 PCR Not Detected     Chlamydophila pneumoniae PCR Not Detected     Mycoplasma pneumo by PCR Not Detected     Influenza A PCR Not Detected     Influenza A H3 Not Detected     Influenza A H1 Not Detected     RSV, PCR Not Detected    S. Pneumo Ag Urine or CSF - Urine, Urine, Catheter [[id number]]  (Normal) Collected:  10/14/19 1929    Lab Status:  Final result Specimen:  Urine, Catheter Updated:  10/15/19 0831     Strep Pneumo Ag Negative    Legionella Antigen, Urine - Urine, Urine, Catheter [[id number]]  (Normal) Collected:  10/14/19 1929    Lab Status:  Final result Specimen:  Urine, Catheter Updated:  10/15/19 0830     LEGIONELLA ANTIGEN, URINE Negative    Urine Culture - Urine, Urine, Catheter [[id number]]  (Abnormal)  (Susceptibility) Collected:  10/14/19 1929    Lab Status:  Final result Specimen:  Urine, Catheter Updated:  10/16/19 1248     Urine Culture >100,000 CFU/mL Escherichia coli ESBL     Comment:   Consider infectious disease consult.  Susceptibility results may not correlate to clinical outcomes.       Susceptibility      Escherichia coli ESBL      WESLEY     Gentamicin Resistant     Levofloxacin Resistant     Meropenem Susceptible     Nitrofurantoin Susceptible     Piperacillin + Tazobactam Susceptible     Tetracycline Resistant     Trimethoprim + Sulfamethoxazole Resistant                    Blood Culture - Blood, Blood, Venous Line [[id number]] Collected:  10/14/19 1716    Lab Status:  Preliminary result Specimen:  Blood, Venous Line Updated:  10/17/19 1730     Blood Culture No growth at 3 days    Blood Culture - Blood, Arm, Right [[id number]]  (Abnormal) Collected:  10/14/19 1647    Lab Status:  Final result Specimen:  Blood from Arm, Right Updated:  10/18/19 0909     Blood Culture Corynebacterium species     Comment: Probable contaminant requires clinical correlation, susceptibility not performed unless requested by physician.            Isolated from Aerobic Bottle     Gram Stain Aerobic Bottle Gram positive bacilli resembling diphtheroids    Narrative:       Blood culture does not meet the specified criteria for PCR identification.  If pregnant, immunocompromised, or clinical concern for meningitis, call lab to run BCID for Listeria monocytogenes.          ECG/EMG Results (most recent)     Procedure Component Value Units Date/Time    ECG 12 Lead [[id number]] Collected:  10/14/19 1620     Updated:  10/15/19 0639    Narrative:       HEART RATE= 125  bpm  RR Interval= 481  ms  KY Interval= 141  ms  P Horizontal Axis=   deg  P Front Axis= 0  deg  QRSD Interval= 71  ms  QT Interval= 345  ms  QRS Axis= -22  deg  T Wave Axis= 37  deg  - ABNORMAL ECG -  Atrial Fibrillation  Inferior infarct, old  Compare to previous ventricle response decreased  When compared with ECG of 15-Feb-2019 0:28:46,  New or worsened ischemia or infarction  Electronically Signed By: [facility]) 15-Oct-2019 06:39:40  Date and Time of Study: 2019-10-14 16:20:23    ECG 12 Lead [[id number]] Collected:  10/14/19 1602     Updated:  10/15/19 0640    Narrative:       HEART RATE= 187   bpm  RR Interval= 320  ms  HI Interval=   ms  P Horizontal Axis=   deg  P Front Axis= 0  deg  QRSD Interval= 131  ms  QT Interval= 296  ms  QRS Axis= -82  deg  T Wave Axis= 151  deg  - ABNORMAL ECG -  Wide-QRS tachycardia  When compared with ECG of 15-Feb-2019 0:28:46,  Significant rate increase  Electronically Signed By: Jossue Donovan (BARBARA) 15-Oct-2019 06:40:17  Date and Time of Study: 2019-10-14 16:02:33    Adult Transthoracic Echo Complete W/ Cont if Necessary Per Protocol [[id number]] Collected:  10/15/19 0733     Updated:  10/16/19 0839     BSA 1.6 m^2       CV ECHO FEDERICO - RVDD 2.5 cm      IVSd 0.85 cm      LVIDd 3.6 cm      LVIDs 2.4 cm      LVPWd 0.9 cm      IVS/LVPW 0.94     FS 32.6 %      EDV(Teich) 54.0 ml      ESV(Teich) 20.6 ml      EF(Teich) 61.9 %      EDV(cubed) 46.2 ml      ESV(cubed) 14.2 ml      EF(cubed) 69.3 %      LV mass(C)d 88.9 grams      LV mass(C)dI 55.8 grams/m^2      SV(Teich) 33.4 ml      SI(Teich) 21.0 ml/m^2      SV(cubed) 32.0 ml      SI(cubed) 20.1 ml/m^2      Ao root diam 2.6 cm      Ao root area 5.2 cm^2      ACS 1.6 cm      LVOT diam 1.8 cm      LVOT area 2.4 cm^2      RVOT diam 2.4 cm      RVOT area 4.4 cm^2      EDV(MOD-sp4) 31.5 ml      ESV(MOD-sp4) 14.5 ml      EF(MOD-sp4) 54.0 %      SV(MOD-sp4) 17.0 ml      SI(MOD-sp4) 10.7 ml/m^2      Ao root area (BSA corrected) 1.6     LV Villas[place]or Vol (BSA corrected) 19.8 ml/m^2      LV Sys Vol (BSA corrected) 9.1 ml/m^2      MV E max jermaine 81.7 cm/sec      MV V2 max 83.7 cm/sec      MV max PG 2.8 mmHg      MV V2 mean 44.4 cm/sec      MV mean PG 1.1 mmHg      MV V2 VTI 13.8 cm      MVA(VTI) 1.8 cm^2      MV dec time 0.2 sec      LV V1 max PG 1.2 mmHg      LV V1 mean PG 0.59 mmHg      LV V1 max 54.8 cm/sec      LV V1 mean 35.8 cm/sec      LV V1 VTI 10.4 cm      MR max jermaine 255.2 cm/sec      MR max PG 26.1 mmHg      SV(LVOT) 25.4 ml      SV(RVOT) 34.2 ml      SI(LVOT) 15.9 ml/m^2      PA V2 max 61.0 cm/sec      PA max PG 1.5 mmHg      PA max PG  (full) 0.43 mmHg       CV ECHO FEDERICO - PVA(V,A) 3.7 cm^2       CV ECHO FEDERICO - PVA(V,D) 3.7 cm^2      RV V1 max PG 1.1 mmHg      RV V1 mean PG 0.44 mmHg      RV V1 max 51.4 cm/sec      RV V1 mean 30.7 cm/sec      RV V1 VTI 7.8 cm      TR max jermaine 291.7 cm/sec      RVSP(TR) 37.0 mmHg      RAP systole 3.0 mmHg      Qp/Qs 1.3      CV ECHO FEDERICO - BZI_BMI 22.5 kilograms/m^2       CV ECHO FEDERICO - BSA(HAYCOCK) 1.6 m^2       CV ECHO FEDERICO - BZI_METRIC_WEIGHT 57.6 kg       CV ECHO FEDERICO - BZI_METRIC_HEIGHT 160.0 cm      EF(MOD-bp) 54.0 %      LA dimension(2D) 4.0 cm      Echo EF Estimated 60 %     Narrative:       · Estimated EF = 60%.  · Left ventricular systolic function is normal.  · Right ventricular cavity is moderately dilated.  · Left atrial cavity size is mild-to-moderately dilated.  · There is calcification of the aortic valve.  · Mild-to-moderate mitral valve regurgitation is present  · Mild tricuspid valve regurgitation is present.                  Results for orders placed during the hospital encounter of 10/14/19   Adult Transthoracic Echo Complete W/ Cont if Necessary Per Protocol    Narrative · Estimated EF = 60%.  · Left ventricular systolic function is normal.  · Right ventricular cavity is moderately dilated.  · Left atrial cavity size is mild-to-moderately dilated.  · There is calcification of the aortic valve.  · Mild-to-moderate mitral valve regurgitation is present  · Mild tricuspid valve regurgitation is present.          Ct Abdomen Pelvis Without Contrast    Result Date: 10/14/2019   1. Subtotal colectomy. 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 The subcutaneous portion of the ileal loop is slightly redundant however there is no small bowel distention to suggest obstruction. There are some scattered small bowel air-fluid levels in nondistended loops which could reflect an associated mild ileus or enteritis. 2. Moderate to large bilateral pleural effusions and dependent bibasilar  atelectasis. In addition there is a mild amount of ascites deep in the pelvis and there is diffuse increased soft tissue stranding in the subcutaneous fat and mesenteric fat. The constellation of these findings does raise concern for changes of congestive heart failure. 3. Incidental note is made of a 3 cm right lateral hernia containing mesenteric fat.  Electronically Signed ByIsrael Carpenter On:10/14/2019 7:16 PM This report was finalized on [id number] by  Ulysses Carpenter, .    Us Renal Limited    Result Date: 10/15/2019  Unremarkable retroperitoneal ultrasound examination. No evidence for hydronephrosis. Electronically signed by:  Jw Weiner M.D.  10/15/2019 3:23 AM    Xr Chest 1 View    Result Date: 10/18/2019  1.  Markedly increased basilar consolidation and pleural fluid, likely edema. 2.  Right PICC line tip is in the SVC. Electronically signed by:  Jw Weiner M.D.  10/18/2019 3:25 AM    Xr Chest 1 View    Result Date: 10/14/2019  Under 1. PICC line in good position with the tip in the superior vena cava. 2. Findings suggesting changes of pulmonary edema and congestive failure which are worsened from the previous study.  Electronically Signed ByIsrael Carpenter On:10/14/2019 7:03 PM This report was finalized on [id number] by  Ulysses Carpenter, .    Xr Chest 1 View    Result Date: 10/14/2019  Renomegaly. Basilar infiltrates and effusions. The appearance is somewhat more suggestive of changes of pulmonary edema and congestive failure. An underlying pneumonia cannot be excluded.  Electronically Signed ByIsrael Carpenter On:10/14/2019 5:14 PM This report was finalized on [id number] by  Ulysses Carpenter, .    Xr Abdomen Kub    Result Date: 10/15/2019  1.Tip of the enteric tube terminates in the left mid abdomen, likely in the gastric body. 2.Nonspecific upper abdominal bowel gas pattern. 3.Moderate bilateral pleural effusions with underlying atelectasis.  Electronically Signed ByYesenia  Kumar On:10/15/2019 8:37 AM This report was finalized on [id number] by  Gloria Heath, .      Xrays, labs reviewed personally by physician.    Medication Review:   I have reviewed the patient's current medication list  reviewed    Scheduled Meds    apixaban 2.5 mg Oral Q12H   dexamethasone 1 drop Both Eyes Q8H   insulin lispro 0-7 Units Subcutaneous 4x Daily With Meals & Nightly   levothyroxine 75 mcg Oral QAM AC   meropenem 500 mg Intravenous Q12H   metoprolol tartrate 25 mg Oral Q12H   mirtazapine 7.5 mg Oral Nightly   polyethyl glycol-propyl glycol 1 drop Both Eyes BID   potassium chloride 20 mEq Oral Daily   prenatal vitamin 27-0.8 1 tablet Oral Daily   sertraline 25 mg Oral Daily   sodium chloride 10 mL Intravenous Q12H       Meds Infusions    amiodarone 0.5 mg/min Last Rate: 0.5 mg/min (10/18/19 0916)   Pharmacy to Dose meropenem (MERREM)     sodium chloride 75 mL/hr Last Rate: 75 mL/hr (10/17/19 1700)       Meds PRN  dextrose  •  dextrose  •  glucagon (human recombinant)  •  metoprolol tartrate  •  ondansetron  •  Pharmacy to Dose meropenem (MERREM)  •  sodium chloride  •  sodium chloride        Assessment / Plan    Active Hospital Problems    Diagnosis  POA   • Atrial fibrillation with RVR (CMS/HCC) [I48.91]  Unknown   • Acute on chronic renal failure (CMS/HCC) [N17.9, N18.9]  Unknown   • Elevated brain natriuretic peptide (BNP) level [R79.89]  Unknown   • Elevated troponin [R79.89]  Unknown      Resolved Hospital Problems    Diagnosis Date Resolved POA   • Septic shock (CMS/HCC) [A41.9, R65.21] 10/17/2019 Yes     Patient is a 89-year-old female with history of atrial fibrillation presented in septic shock with A. fib with RVR likely secondary to uropathogen now appearing stable for transfer out of the ICU     Shortness of breath -likely multifactorial given patient A. fib with RVR and signs of pleural effusion along with being septic shock.  Possible residual shock picture.  However patient also shows  signs of fluid overload and acute worsening may be secondary to pulmonary edema  -Wean high flow  -Treatment for sepsis below  -Received Lasix 40 mg IV x1 currently, improvement in her lactate  -Given congestive cardiomyopathy picture along with renal insufficiency may fit cardio renal syndrome picture, if continues to decline may consider Lasix with low-dose dopamine  -If continues to decompensate may require transfer back to the ICU  -Elevated proBNP at 26,000     Septic shock likely related to urosepsis with hx of ESBL E. coli UTI  - urine culture positive for E. Coli  - lactic acid 9.1 on admission now back up to 7 and most recently down to 2.1  - continue meropenem  - patient previously on Vancomycin, discontinued   - diabetic diet   - Could consider vitamin C thiamine and hydrocortisone if patient declines again     Lactic acidosis -multifactorial.  May be due to lack of perfusion given congested cardiomyopathy versus sympathetic overdrive given septic picture.  Has oscillated significantly for patient.  No definitive history of cirrhosis or hepatic failure  -Trend cautiously    Acute hypoxic respiratory failure -see shortness of breath likely multifactorial  - CXR: pulmonary edema   - respiratory viral panel negative     Leukocytosis, mild  - WBC 13.1  - monitor      Hyperkalemia- resolved may be secondary to acute renal injury  -Potassium is 4.1 today  - insulin, calcium, D50 and bicarb given on admission  - monitor      Elevated troponin -given patient's cardiac history and age along with septic shock most likely demand ischemia  - troponin 0.34, 0.34, 0.43  - cardiology following, continue conservative treatment     Acute on chronic kidney disease stage III -monitor for cardiorenal component  - baseline Cr 1.4  - BUN 48 / Cr 2.05  - renal ultrasound negative  - nephrology following   - off bicarb drip   -Off IV fluids given acute worsening of respiratory status     Atrial fibrillation with RVR s/p  permanent pacemaker   - TTE: EF 60%  - continue amiodarone drip per cardiology   - metoprolol 12.5 mg BID added   - on chronic anticoagulation with Eliquis  - cardiac monitoring   - Appreciate cardiology input     Possible ileus noted on CT -no abdominal complaints currently  - patient had NG tube placed, since removed      H/o Hypertension -as blood pressure improves resume home medications  -Home meds     H/o LLE DVT and PE -currently anticoagulated secondary to atrial fibrillation     Depression  - continue home Zoloft and Remeron      Hypothyroidism  - continue home synthroid      CHF -we will continue to monitor cardiopulmonary status     H/o ileostomy r/t C. Diff     VTE prophylaxis - bilateral SCDs, Eliquis       Andrea Alvarez MD  10/18/19  2:10 PM

## 2019-10-19 NOTE — PROGRESS NOTES
[facility] Medicine Services Daily Progress Note      Hospitalist Team  LOS 5 days      Patient Care Team:  Florentino Kimbrough MD as PCP - General (Family Medicine)        Chief Complaint / Subjective  Chief Complaint   Patient presents with   • Abnormal Lab     Patient improving since yesterday.  The pressure ventilation now back onto nasal cannula.  Patient seen lying in bed awake and alert and conversational.  Reports no significant improvement in respiratory status despite no longer being obtunded due to hypoxia and CO2 narcosis.    Brief Synopsis of Hospital Course/HPI    Dinora Vásquez is a 89 y.o. female who was presented to the ER after being seen earlier by Nephrology and found to be hypotensive and tachycardic; in addition she complained of dyspnea, abdominal pain and not feeling well.  She had a bolus of IVFs started and transferred to the ER.  Workup in the ER revealed afib with RVR, UTI, septic shock, CHF, acute hypoxic respiratory failure and LEYLA.  She had IVFs per sepsis protocol given and started on Meropenem.  She was given a bolus of amio and Cardizem without improvement in her heart rate and subsequently developed hypotension.  She had a PICC ordered by the ER and was started on Levophed.   The patient is currently difficult to get a history from as she is obtunded.  Her sats were in the 70's and she was started on Precision Flow with improvement.       10/15/19: Short of breath with minimal activity.  Remains on Precision flow supplemental oxygen.  No chest pains.  No nausea or vomiting  10/16/19:  Improved today on 8L high flow nasal cannula.  HR remains 120s.  No abdominal pain.  No CP.  10/17/19:  No issues overnight.  Off vasopressor.  The hospitalists were consulted for medical management.  The patient states she is doing well.  She reports mild shortness of breath.  She denies chest pain, nausea, vomiting, abdominal pain.           Review of systems negative for  "all other 10 systems    Review of Systems   Constitution: Negative for chills and fever.   HENT: Negative for congestion and sore throat.    Cardiovascular: Negative for chest pain and palpitations.   Respiratory: Positive for shortness of breath. Negative for hemoptysis.    Gastrointestinal: Negative for nausea and vomiting.   Neurological: Negative for dizziness and headaches.   Psychiatric/Behavioral: Negative for hallucinations. The patient does not have insomnia.        Family History   Problem Relation Age of Onset   • Heart disease Mother    • Diabetes Mother    • Heart disease Sister    • Diabetes Sister        Past Medical History:   Diagnosis Date   • Ankle wound, right, initial encounter    • Aortic stenosis    • Atrial fibrillation (CMS/HCC)    • Coronary artery disease    • DVT (deep venous thrombosis) (CMS/HCC)    • Hypothyroidism    • Left bundle branch block    • Pulmonary embolus (CMS/HCC)    • Pulmonary hypertension (CMS/HCC)    • Sinus bradycardia        Social History     Socioeconomic History   • Marital status:      Spouse name: Not on file   • Number of children: Not on file   • Years of education: Not on file   • Highest education level: Not on file   Occupational History   • Occupation: Retired   Tobacco Use   • Smoking status: Never Smoker   • Smokeless tobacco: Never Used   Substance and Sexual Activity   • Alcohol use: No     Frequency: Never   • Drug use: No           Objective      Vital Signs  Temp:  [96 °F (35.6 °C)-97.5 °F (36.4 °C)] 96.1 °F (35.6 °C)  Heart Rate:  [] 93  Resp:  [14-28] 14  BP: ()/(65-80) 96/70  Oxygen Therapy  SpO2: 98 %  Pulse Oximetry Type: Continuous  Device (Oxygen Therapy): high-flow nasal cannula  $ High Flow Nasal Cannula Set-Up: yes  Flow (L/min): 7  Oxygen Concentration (%): 60  Flowsheet Rows      First Filed Value   Admission Height  160 cm (63\") Documented at 10/14/2019 1551   Admission Weight  48.3 kg (106 lb 7.7 oz) Documented at " 10/14/2019 1551        Intake & Output (last 3 days)       10/16 0701 - 10/17 0700 10/17 0701 - 10/18 0700 10/18 0701 - 10/19 0700 10/19 0701 - 10/20 0700    P.O. 360 240      I.V. (mL/kg) 2293 (38.2)       IV Piggyback   200     Total Intake(mL/kg) 2653 (44.2) 240 (3.9) 200 (3.2)     Urine (mL/kg/hr) 565 (0.4) 100 (0.1) 850 (0.6) 450 (0.6)    Stool  100  100    Total Output 565 200 850 550    Net +2088 +40 -650 -550            Urine Unmeasured Occurrence 2 x 2 x          Lines, Drains & Airways    Active LDAs     Name:   Placement date:   Placement time:   Site:   Days:    PICC Triple Lumen 10/14/19 Right Basilic   10/14/19    1828    Basilic   3    Ileostomy RLQ   10/14/19    2000    RLQ   3    External Urinary Catheter   10/16/19    0900    --   2                  Physical Exam:     General: Elderly female lying in bed with nasal cannula, breathing comfortably and in no acute distress  HEENT: NC/AT, EOMI, mucosa moist  Heart: Irregular.  No gallops  Chest: Increased work of breathing, unable to appreciate wheezes some crackles noted  Abdominal: Soft. NT/ND.  Musculoskeletal: Normal ROM.  No cyanosis no calf tenderness.  Neurological: AAOx3, no focal deficits  Skin: Skin is warm and dry. No rash  Psychiatric: Normal mood and affect.  Patient is very hard of hearing.    Procedures:              Results Review:     I reviewed the patient's new clinical results.  reviewed    Results from last 7 days   Lab Units 10/19/19  0423 10/18/19  0356 10/17/19  0405 10/16/19  0400 10/15/19  0424   WBC 10*3/mm3 13.40* 13.90* 13.10* 20.50* 24.90*   HEMOGLOBIN g/dL 11.4* 11.7* 11.0* 11.0* 11.2*   HEMATOCRIT % 36.4 37.7 34.7 34.2 36.4   PLATELETS 10*3/mm3 120* 139* 116* 160 185   MONOCYTES % %  --   --   --  7.0 6.0     Results from last 7 days   Lab Units 10/19/19  0423 10/18/19  0356 10/17/19  1653  10/15/19  1121  10/14/19  1647 10/14/19  1619   SODIUM mmol/L 143 137 139   < > 146*   < > 144 142   POTASSIUM mmol/L 4.1 4.2 4.1    < > 4.2   < > 5.2* 5.2*   CHLORIDE mmol/L 99 99 99   < > 104   < > 102 103   CO2 mmol/L 32.0* 21.0* 28.0   < > 23.0   < > 14.0* 12.0*   BUN mg/dL 44* 39* 40*   < > 68*   < > 71* 71*   CREATININE mg/dL 1.68* 1.70* 1.70*   < > 3.18*   < > 3.80* 4.00*   CALCIUM mg/dL 7.7* 7.8* 7.5*   < > 7.3*   < > 8.2* 8.0*   BILIRUBIN mg/dL  --   --   --   --  1.4*  --  2.3* 2.1*   ALK PHOS U/L  --   --   --   --  94  --  97* 97*   ALT (SGPT) U/L  --   --   --   --  27  --  <5* <5*   AST (SGOT) U/L  --   --   --   --  78*  --  51* 51*   GLUCOSE mg/dL 124* 170* 169*   < > 213*   < > 142* 152*    < > = values in this interval not displayed.     Results from last 7 days   Lab Units 10/19/19  0423 10/18/19  0356 10/17/19  0405   MAGNESIUM mg/dL 1.7 2.1 2.0     Lab Results   Component Value Date    CALCIUM 7.7 (L) 10/19/2019    PHOS 4.2 10/19/2019     No results found for: HGBA1C  Results from last 7 days   Lab Units 10/19/19  0423 10/18/19  0356 10/16/19  0400   INR  1.84* 1.60* 2.10*       Results from last 7 days   Lab Units 10/19/19  0345   PH, ARTERIAL pH units 7.364   PO2 ART mm Hg 99.5   PCO2, ARTERIAL mm Hg 59.8*   HCO3 ART mmol/L 34.0*         Microbiology Results (last 10 days)     Procedure Component Value - Date/Time    MRSA Screen Culture - Swab, Nares [[id number]]  (Normal) Collected:  10/14/19 2036    Lab Status:  Final result Specimen:  Swab from Nares Updated:  10/15/19 2036     MRSA SCREEN CX No Methicillin Resistant Staphylococcus aureus isolated    Respiratory Panel, PCR - Swab, Nasopharynx [[id number]]  (Normal) Collected:  10/14/19 1951    Lab Status:  Final result Specimen:  Swab from Nasopharynx Updated:  10/14/19 2236     ADENOVIRUS, PCR Not Detected     Coronavirus 229E Not Detected     Coronavirus HKU1 Not Detected     Coronavirus NL63 Not Detected     Coronavirus OC43 Not Detected     Human Metapneumovirus Not Detected     Human Rhinovirus/Enterovirus Not Detected     Influenza B PCR Not Detected      Parainfluenza Virus 1 Not Detected     Parainfluenza Virus 2 Not Detected     Parainfluenza Virus 3 Not Detected     Parainfluenza Virus 4 Not Detected     Bordetella pertussis pcr Not Detected     Influenza A H1 2009 PCR Not Detected     Chlamydophila pneumoniae PCR Not Detected     Mycoplasma pneumo by PCR Not Detected     Influenza A PCR Not Detected     Influenza A H3 Not Detected     Influenza A H1 Not Detected     RSV, PCR Not Detected    S. Pneumo Ag Urine or CSF - Urine, Urine, Catheter [[id number]]  (Normal) Collected:  10/14/19 1929    Lab Status:  Final result Specimen:  Urine, Catheter Updated:  10/15/19 0831     Strep Pneumo Ag Negative    Legionella Antigen, Urine - Urine, Urine, Catheter [[id number]]  (Normal) Collected:  10/14/19 1929    Lab Status:  Final result Specimen:  Urine, Catheter Updated:  10/15/19 0830     LEGIONELLA ANTIGEN, URINE Negative    Urine Culture - Urine, Urine, Catheter [[id number]]  (Abnormal)  (Susceptibility) Collected:  10/14/19 1929    Lab Status:  Final result Specimen:  Urine, Catheter Updated:  10/16/19 1248     Urine Culture >100,000 CFU/mL Escherichia coli ESBL     Comment:   Consider infectious disease consult.  Susceptibility results may not correlate to clinical outcomes.       Susceptibility      Escherichia coli ESBL     WESLEY     Gentamicin Resistant     Levofloxacin Resistant     Meropenem Susceptible     Nitrofurantoin Susceptible     Piperacillin + Tazobactam Susceptible     Tetracycline Resistant     Trimethoprim + Sulfamethoxazole Resistant                    Blood Culture - Blood, Blood, Venous Line [[id number]] Collected:  10/14/19 1716    Lab Status:  Final result Specimen:  Blood, Venous Line Updated:  10/19/19 1730     Blood Culture No growth at 5 days    Blood Culture - Blood, Arm, Right [[id number]]  (Abnormal) Collected:  10/14/19 1647    Lab Status:  Final result Specimen:  Blood from Arm, Right Updated:  10/18/19 0909     Blood Culture Corynebacterium  species     Comment: Probable contaminant requires clinical correlation, susceptibility not performed unless requested by physician.            Isolated from Aerobic Bottle     Gram Stain Aerobic Bottle Gram positive bacilli resembling diphtheroids    Narrative:       Blood culture does not meet the specified criteria for PCR identification.  If pregnant, immunocompromised, or clinical concern for meningitis, call lab to run BCID for Listeria monocytogenes.          ECG/EMG Results (most recent)     Procedure Component Value Units Date/Time    ECG 12 Lead [[id number]] Collected:  10/14/19 1620     Updated:  10/15/19 0639    Narrative:       HEART RATE= 125  bpm  RR Interval= 481  ms  IA Interval= 141  ms  P Horizontal Axis=   deg  P Front Axis= 0  deg  QRSD Interval= 71  ms  QT Interval= 345  ms  QRS Axis= -22  deg  T Wave Axis= 37  deg  - ABNORMAL ECG -  Atrial Fibrillation  Inferior infarct, old  Compare to previous ventricle response decreased  When compared with ECG of 15-Feb-2019 0:28:46,  New or worsened ischemia or infarction  Electronically Signed By: Jossue Donovan ([facility]) 15-Oct-2019 06:39:40  Date and Time of Study: 2019-10-14 16:20:23    ECG 12 Lead [[id number]] Collected:  10/14/19 1602     Updated:  10/15/19 0640    Narrative:       HEART RATE= 187  bpm  RR Interval= 320  ms  IA Interval=   ms  P Horizontal Axis=   deg  P Front Axis= 0  deg  QRSD Interval= 131  ms  QT Interval= 296  ms  QRS Axis= -82  deg  T Wave Axis= 151  deg  - ABNORMAL ECG -  Wide-QRS tachycardia  When compared with ECG of 15-Feb-2019 0:28:46,  Significant rate increase  Electronically Signed By: Jossue Donovan ([facility]) 15-Oct-2019 06:40:17  Date and Time of Study: 2019-10-14 16:02:33    Adult Transthoracic Echo Complete W/ Cont if Necessary Per Protocol [[id number]] Collected:  10/15/19 0733     Updated:  10/16/19 0839     BSA 1.6 m^2      BH CV ECHO FEDERICO - RVDD 2.5 cm      IVSd 0.85 cm      LVIDd 3.6 cm      LVIDs 2.4 cm      LVPWd 0.9  cm      IVS/LVPW 0.94     FS 32.6 %      EDV(Teich) 54.0 ml      ESV(Teich) 20.6 ml      EF(Teich) 61.9 %      EDV(cubed) 46.2 ml      ESV(cubed) 14.2 ml      EF(cubed) 69.3 %      LV mass(C)d 88.9 grams      LV mass(C)dI 55.8 grams/m^2      SV(Teich) 33.4 ml      SI(Teich) 21.0 ml/m^2      SV(cubed) 32.0 ml      SI(cubed) 20.1 ml/m^2      Ao root diam 2.6 cm      Ao root area 5.2 cm^2      ACS 1.6 cm      LVOT diam 1.8 cm      LVOT area 2.4 cm^2      RVOT diam 2.4 cm      RVOT area 4.4 cm^2      EDV(MOD-sp4) 31.5 ml      ESV(MOD-sp4) 14.5 ml      EF(MOD-sp4) 54.0 %      SV(MOD-sp4) 17.0 ml      SI(MOD-sp4) 10.7 ml/m^2      Ao root area (BSA corrected) 1.6     LV Villasenor Vol (BSA corrected) 19.8 ml/m^2      LV Sys Vol (BSA corrected) 9.1 ml/m^2      MV E max jermaine 81.7 cm/sec      MV V2 max 83.7 cm/sec      MV max PG 2.8 mmHg      MV V2 mean 44.4 cm/sec      MV mean PG 1.1 mmHg      MV V2 VTI 13.8 cm      MVA(VTI) 1.8 cm^2      MV dec time 0.2 sec      LV V1 max PG 1.2 mmHg      LV V1 mean PG 0.59 mmHg      LV V1 max 54.8 cm/sec      LV V1 mean 35.8 cm/sec      LV V1 VTI 10.4 cm      MR max jermaine 255.2 cm/sec      MR max PG 26.1 mmHg      SV(LVOT) 25.4 ml      SV(RVOT) 34.2 ml      SI(LVOT) 15.9 ml/m^2      PA V2 max 61.0 cm/sec      PA max PG 1.5 mmHg      PA max PG (full) 0.43 mmHg      BH CV ECHO FEDERICO - PVA(V,A) 3.7 cm^2      BH CV ECHO FEDERICO - PVA(V,D) 3.7 cm^2      RV V1 max PG 1.1 mmHg      RV V1 mean PG 0.44 mmHg      RV V1 max 51.4 cm/sec      RV V1 mean 30.7 cm/sec      RV V1 VTI 7.8 cm      TR max jermaine 291.7 cm/sec      RVSP(TR) 37.0 mmHg      RAP systole 3.0 mmHg      Qp/Qs 1.3     BH CV ECHO FEDERICO - BZI_BMI 22.5 kilograms/m^2       CV ECHO FEDERICO - BSA(HAYCOCK) 1.6 m^2       CV ECHO FEDERICO - BZI_METRIC_WEIGHT 57.6 kg       CV ECHO FEDERICO - BZI_METRIC_HEIGHT 160.0 cm      EF(MOD-bp) 54.0 %      LA dimension(2D) 4.0 cm      Echo EF Estimated 60 %     Narrative:       · Estimated EF = 60%.  · Left ventricular  systolic function is normal.  · Right ventricular cavity is moderately dilated.  · Left atrial cavity size is mild-to-moderately dilated.  · There is calcification of the aortic valve.  · Mild-to-moderate mitral valve regurgitation is present  · Mild tricuspid valve regurgitation is present.                  Results for orders placed during the hospital encounter of 10/14/19   Adult Transthoracic Echo Complete W/ Cont if Necessary Per Protocol    Narrative · Estimated EF = 60%.  · Left ventricular systolic function is normal.  · Right ventricular cavity is moderately dilated.  · Left atrial cavity size is mild-to-moderately dilated.  · There is calcification of the aortic valve.  · Mild-to-moderate mitral valve regurgitation is present  · Mild tricuspid valve regurgitation is present.          Ct Abdomen Pelvis Without Contrast    Result Date: 10/14/2019   1. Subtotal colectomy. 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 The subcutaneous portion of the ileal loop is slightly redundant however there is no small bowel distention to suggest obstruction. There are some scattered small bowel air-fluid levels in nondistended loops which could reflect an associated mild ileus or enteritis. 2. Moderate to large bilateral pleural effusions and dependent bibasilar atelectasis. In addition there is a mild amount of ascites deep in the pelvis and there is diffuse increased soft tissue stranding in the subcutaneous fat and mesenteric fat. The constellation of these findings does raise concern for changes of congestive heart failure. 3. Incidental note is made of a 3 cm right lateral hernia containing mesenteric fat.  Electronically Signed By-Ulysses Carpenter On:10/14/2019 7:16 PM This report was finalized on [id number] by  Ulysses Carpenter, .    Us Renal Limited    Result Date: 10/15/2019  Unremarkable retroperitoneal ultrasound examination. No evidence for hydronephrosis. Electronically signed by:  Jw Weiner,  M.D.  10/15/2019 3:23 AM    Xr Chest 1 View    Result Date: 10/19/2019  1.Moderate to large bilateral pleural effusions, similar to the most recent prior exam, but increased compared to 10/14/2019. There are diffuse interstitial opacities and findings suggest pulmonary edema/volume overload. 2.Bibasilar airspace opacities and consolidation may be related to atelectasis, edema, or infiltrate. 3.Cardiomegaly. 4.Right arm PICC terminates in the mid SVC.  Electronically Signed By-DR. Reilly Cody MD On:10/19/2019 8:37 AM This report was finalized on [id number] by DR. Reilly Cody MD.    Xr Chest 1 View    Result Date: 10/18/2019  1.  Markedly increased basilar consolidation and pleural fluid, likely edema. 2.  Right PICC line tip is in the SVC. Electronically signed by:  Jw Weiner M.D.  10/18/2019 3:25 AM    Xr Chest 1 View    Result Date: 10/14/2019  Under 1. PICC line in good position with the tip in the superior vena cava. 2. Findings suggesting changes of pulmonary edema and congestive failure which are worsened from the previous study.  Electronically Signed By-Ulysses Carpenter On:10/14/2019 7:03 PM This report was finalized on [id number] by  Ulysses Carpenter, .    Xr Chest 1 View    Result Date: 10/14/2019  Renomegaly. Basilar infiltrates and effusions. The appearance is somewhat more suggestive of changes of pulmonary edema and congestive failure. An underlying pneumonia cannot be excluded.  Electronically Signed By-Ulysses Carpenter On:10/14/2019 5:14 PM This report was finalized on [id number] by  Ulysses Carpenter, .    Xr Abdomen Kub    Result Date: 10/15/2019  1.Tip of the enteric tube terminates in the left mid abdomen, likely in the gastric body. 2.Nonspecific upper abdominal bowel gas pattern. 3.Moderate bilateral pleural effusions with underlying atelectasis.  Electronically Signed By-Gloria Heath On:10/15/2019 8:37 AM This report was finalized on [id number] by  Gloria Heath,  .      Xrays, labs reviewed personally by physician.    Medication Review:   I have reviewed the patient's current medication list  reviewed    Scheduled Meds    apixaban 2.5 mg Oral Q12H   dexamethasone 1 drop Both Eyes Q8H   insulin lispro 0-7 Units Subcutaneous 4x Daily With Meals & Nightly   levothyroxine 75 mcg Oral QAM AC   meropenem 500 mg Intravenous Q12H   metoprolol tartrate 50 mg Oral Q12H   mirtazapine 7.5 mg Oral Nightly   polyethyl glycol-propyl glycol 1 drop Both Eyes BID   potassium chloride 20 mEq Oral Daily   prenatal vitamin 27-0.8 1 tablet Oral Daily   sertraline 25 mg Oral Daily   sodium chloride 10 mL Intravenous Q12H       Meds Infusions    Pharmacy to Dose meropenem (MERREM)        Meds PRN  dextrose  •  dextrose  •  glucagon (human recombinant)  •  metoprolol tartrate  •  ondansetron  •  Pharmacy to Dose meropenem (MERREM)  •  sodium chloride  •  sodium chloride        Assessment / Plan    Active Hospital Problems    Diagnosis  POA   • Atrial fibrillation with RVR (CMS/HCC) [I48.91]  Unknown   • Acute on chronic renal failure (CMS/HCC) [N17.9, N18.9]  Unknown   • Elevated brain natriuretic peptide (BNP) level [R79.89]  Unknown   • Elevated troponin [R79.89]  Unknown      Resolved Hospital Problems    Diagnosis Date Resolved POA   • Septic shock (CMS/HCC) [A41.9, R65.21] 10/17/2019 Yes     Patient is a 89-year-old female with history of atrial fibrillation presented in septic shock with A. fib with RVR likely secondary to uropathogen with brief respiratory decline yesterday but now overall improving     Shortness of breath -likely multifactorial given patient A. fib with RVR and signs of pleural effusion along with being septic shock.  Possible residual shock picture.  However patient also shows signs of fluid overload and acute worsening may be secondary to pulmonary edema  -Wean high flow  -Treatment for sepsis below  -Received Lasix 40 mg IV x1 currently, improvement in her lactate  -Given  congestive cardiomyopathy picture along with renal insufficiency may fit cardio renal syndrome picture, if continues to decline may consider Lasix with low-dose dopamine  -If continues to decompensate may require transfer back to the ICU  -Elevated proBNP at 26,000     Septic shock likely related to urosepsis with hx of ESBL E. coli UTI  - urine culture positive for E. Coli  - lactic acid 9.1 on admission now back up to 7 and most recently down to 2.1, now normalized  - continue meropenem  - patient previously on Vancomycin, discontinued   - diabetic diet   - Could consider vitamin C thiamine and hydrocortisone if patient declines again     Lactic acidosis -multifactorial.  May be due to lack of perfusion given congested cardiomyopathy versus sympathetic overdrive given septic picture.  Has oscillated significantly for patient.  No definitive history of cirrhosis or hepatic failure  -Trend cautiously    Acute hypoxic respiratory failure -see shortness of breath likely multifactorial  - CXR: pulmonary edema   - respiratory viral panel negative     Leukocytosis, mild  - WBC 13.1  - monitor      Hyperkalemia- resolved may be secondary to acute renal injury  -Potassium is 4.1 today  - insulin, calcium, D50 and bicarb given on admission  - monitor      Elevated troponin -given patient's cardiac history and age along with septic shock most likely demand ischemia  - troponin 0.34, 0.34, 0.43  - cardiology following, continue conservative treatment     Acute on chronic kidney disease stage III -monitor for cardiorenal component  - baseline Cr 1.4  - BUN 48 / Cr 2.05  - renal ultrasound negative  - nephrology following   - off bicarb drip   -Off IV fluids given acute worsening of respiratory status     Atrial fibrillation with RVR s/p permanent pacemaker   - TTE: EF 60%  - continue amiodarone drip per cardiology   - metoprolol 12.5 mg BID added   - on chronic anticoagulation with Eliquis  - cardiac monitoring   - Appreciate  cardiology input     Possible ileus noted on CT -no abdominal complaints currently  - patient had NG tube placed, since removed      H/o Hypertension -as blood pressure improves resume home medications  -Home meds     H/o LLE DVT and PE -currently anticoagulated secondary to atrial fibrillation     Depression  - continue home Zoloft and Remeron      Hypothyroidism  - continue home synthroid      CHF -we will continue to monitor cardiopulmonary status     H/o ileostomy r/t C. Diff     VTE prophylaxis - bilateral SCDs, Michellequthaddeus Alvarez MD  10/19/19  6:32 PM

## 2019-10-19 NOTE — PLAN OF CARE
Problem: Fall Risk (Adult)  Goal: Identify Related Risk Factors and Signs and Symptoms  Outcome: Ongoing (interventions implemented as appropriate)    Goal: Absence of Fall  Outcome: Ongoing (interventions implemented as appropriate)      Problem: Patient Care Overview  Goal: Plan of Care Review  Outcome: Ongoing (interventions implemented as appropriate)   10/19/19 1512   Coping/Psychosocial   Plan of Care Reviewed With patient   Plan of Care Review   Progress improving   OTHER   Outcome Summary Pt is more alert and oriented today. Requiring less O2, lactic wnl. No distress noticed.        Problem: Skin Injury Risk (Adult)  Goal: Skin Health and Integrity  Outcome: Ongoing (interventions implemented as appropriate)

## 2019-10-19 NOTE — PROGRESS NOTES
"                                                                                                                                      Nephrology  Progress Note                                        [facility]    Patient Identification    Name: Dinora Vásquez  Age: 89 y.o.  Sex: female  :  1926  MRN: [id number]      DATE OF SERVICE:  10/19/2019        Subective    Less soa NC     Objective   Scheduled Meds:    apixaban 2.5 mg Oral Q12H   dexamethasone 1 drop Both Eyes Q8H   insulin lispro 0-7 Units Subcutaneous 4x Daily With Meals & Nightly   levothyroxine 75 mcg Oral QAM AC   meropenem 500 mg Intravenous Q12H   metoprolol tartrate 50 mg Oral Q12H   mirtazapine 7.5 mg Oral Nightly   polyethyl glycol-propyl glycol 1 drop Both Eyes BID   potassium chloride 20 mEq Oral Daily   prenatal vitamin 27-0.8 1 tablet Oral Daily   sertraline 25 mg Oral Daily   sodium chloride 10 mL Intravenous Q12H         Continuous Infusions:    Pharmacy to Dose meropenem (MERREM)        PRN Meds:dextrose  •  dextrose  •  glucagon (human recombinant)  •  metoprolol tartrate  •  ondansetron  •  Pharmacy to Dose meropenem (MERREM)  •  sodium chloride  •  sodium chloride     Exam:  BP 97/66 (BP Location: Left arm, Patient Position: Lying)   Pulse 92   Temp 96 °F (35.6 °C) (Axillary)   Resp 20   Ht 160 cm (63\")   Wt 62.6 kg (138 lb 0.1 oz)   SpO2 91%   BMI 24.45 kg/m²     Intake/Output last 3 shifts:  I/O last 3 completed shifts:  In: 320 [P.O.:120; IV Piggyback:200]  Out: 1050 [Urine:950; Stool:100]    Intake/Output this shift:  No intake/output data recorded.    Physical exam:  Awake on NC  PERTL  No JVD  Chest: decreased BS occasional ronchi  CVS Regular rate and rhythm, S1 and S2 normal  Abdomen:  Soft, non-tender, bowel sounds active   LE: trace edema  Skin:  No rashes or lesions       Data Review:  All labs (24hrs):   Recent Results (from the past 24 hour(s))   POC Glucose Once    Collection Time: " 10/18/19 12:09 PM   Result Value Ref Range    Glucose 149 (H) 70 - 105 mg/dL   Lactic Acid, Plasma    Collection Time: 10/18/19 12:54 PM   Result Value Ref Range    Lactate 2.1 (C) 0.5 - 2.0 mmol/L   Blood Gas, Arterial    Collection Time: 10/18/19  2:41 PM   Result Value Ref Range    Site Left Brachial     John's Test Positive     pH, Arterial 7.446 7.350 - 7.450 pH units    pCO2, Arterial 47.0 35.0 - 48.0 mm Hg    pO2, Arterial 101.7 83.0 - 108.0 mm Hg    HCO3, Arterial 32.3 (H) 21.0 - 28.0 mmol/L    Base Excess, Arterial 7.1 (H) 0.0 - 3.0 mmol/L    O2 Saturation, Arterial 98.0 94.0 - 98.0 %    CO2 Content 33.8 (H) 22 - 29 mmol/L    Barometric Pressure for Blood Gas      Modality BiPap     FIO2 50 %    Set Tidal Volume 500     PEEP 5     PSV 20 cmH2O    Hemodilution No     Respiratory Rate 18    POC Glucose Once    Collection Time: 10/18/19  4:45 PM   Result Value Ref Range    Glucose 125 (H) 70 - 105 mg/dL   Lactic Acid, Plasma    Collection Time: 10/18/19  5:19 PM   Result Value Ref Range    Lactate 1.7 0.5 - 2.0 mmol/L   POC Glucose Once    Collection Time: 10/18/19  8:10 PM   Result Value Ref Range    Glucose 125 (H) 70 - 105 mg/dL   Lactic Acid, Plasma    Collection Time: 10/18/19  9:06 PM   Result Value Ref Range    Lactate 1.7 0.5 - 2.0 mmol/L   Lactic Acid, Plasma    Collection Time: 10/19/19  1:33 AM   Result Value Ref Range    Lactate 1.6 0.5 - 2.0 mmol/L   Blood Gas, Arterial    Collection Time: 10/19/19  3:45 AM   Result Value Ref Range    Site Left Radial     John's Test Positive     pH, Arterial 7.364 7.350 - 7.450 pH units    pCO2, Arterial 59.8 (H) 35.0 - 48.0 mm Hg    pO2, Arterial 99.5 83.0 - 108.0 mm Hg    HCO3, Arterial 34.0 (H) 21.0 - 28.0 mmol/L    Base Excess, Arterial 6.7 (H) 0.0 - 3.0 mmol/L    O2 Saturation, Arterial 97.2 94.0 - 98.0 %    CO2 Content 35.9 (H) 22 - 29 mmol/L    Barometric Pressure for Blood Gas      Modality Cannula     FIO2 <21 %    Hemodilution Yes    Protime-INR     Collection Time: 10/19/19  4:23 AM   Result Value Ref Range    Protime 17.8 (H) 9.6 - 11.7 Seconds    INR 1.84 (H) 0.90 - 1.10   Basic Metabolic Panel    Collection Time: 10/19/19  4:23 AM   Result Value Ref Range    Glucose 124 (H) 65 - 99 mg/dL    BUN 44 (H) 8 - 23 mg/dL    Creatinine 1.68 (H) 0.57 - 1.00 mg/dL    Sodium 143 136 - 145 mmol/L    Potassium 4.1 3.5 - 5.2 mmol/L    Chloride 99 98 - 107 mmol/L    CO2 32.0 (H) 22.0 - 29.0 mmol/L    Calcium 7.7 (L) 8.2 - 9.6 mg/dL    eGFR Non African Amer 28 (L) >60 mL/min/1.73    BUN/Creatinine Ratio 26.2 (H) 7.0 - 25.0    Anion Gap 12.0 5.0 - 15.0 mmol/L   Magnesium    Collection Time: 10/19/19  4:23 AM   Result Value Ref Range    Magnesium 1.7 1.7 - 2.3 mg/dL   Phosphorus    Collection Time: 10/19/19  4:23 AM   Result Value Ref Range    Phosphorus 4.2 2.5 - 4.5 mg/dL   CBC Auto Differential    Collection Time: 10/19/19  4:23 AM   Result Value Ref Range    WBC 13.40 (H) 3.40 - 10.80 10*3/mm3    RBC 3.69 (L) 3.77 - 5.28 10*6/mm3    Hemoglobin 11.4 (L) 12.0 - 15.9 g/dL    Hematocrit 36.4 34.0 - 46.6 %    MCV 98.6 (H) 79.0 - 97.0 fL    MCH 31.0 26.6 - 33.0 pg    MCHC 31.4 (L) 31.5 - 35.7 g/dL    RDW 18.5 (H) 12.3 - 15.4 %    RDW-SD 63.9 (H) 37.0 - 54.0 fl    MPV 8.1 6.0 - 12.0 fL    Platelets 120 (L) 140 - 450 10*3/mm3    Neutrophil % 88.7 (H) 42.7 - 76.0 %    Lymphocyte % 3.8 (L) 19.6 - 45.3 %    Monocyte % 7.1 5.0 - 12.0 %    Eosinophil % 0.1 (L) 0.3 - 6.2 %    Basophil % 0.3 0.0 - 1.5 %    Neutrophils, Absolute 11.90 (H) 1.70 - 7.00 10*3/mm3    Lymphocytes, Absolute 0.50 (L) 0.70 - 3.10 10*3/mm3    Monocytes, Absolute 1.00 (H) 0.10 - 0.90 10*3/mm3    Eosinophils, Absolute 0.00 0.00 - 0.40 10*3/mm3    Basophils, Absolute 0.00 0.00 - 0.20 10*3/mm3    nRBC 0.6 (H) 0.0 - 0.2 /100 WBC   POC Glucose Once    Collection Time: 10/19/19  7:37 AM   Result Value Ref Range    Glucose 102 70 - 105 mg/dL          Imaging:  [unfilled]    Assessment/Plan:     Atrial fibrillation with  RVR (CMS/HCC)    Acute on chronic renal failure (CMS/HCC)    Elevated brain natriuretic peptide (BNP) level    Elevated troponin     Acute kidney injury on  CKD stage III baseline cr 1.4              -Creatinine stable at 1.6- 1.7    -off ivfs  S/p pulm edema    Lactic acidosis  Hyponatremia on half-normal saline  Hypocalcemia replace  Acute hypoxic respiratory failure  Septic shock  Urosepsis  Leukocytosis  Pleural effusions  Status post hyperkalemia  Atrial fibrillation with rapid ventricular response  History of hypertension  History of lower extremity DVT and PE  New onset congestive heart failure  Abdominal pain with possible ileus  Non-ST elevation MI     Renal wise stable

## 2019-10-19 NOTE — PLAN OF CARE
Problem: Patient Care Overview  Goal: Interprofessional Rounds/Family Conf  Outcome: Ongoing (interventions implemented as appropriate)      Problem: Skin Injury Risk (Adult)  Goal: Identify Related Risk Factors and Signs and Symptoms  Outcome: Ongoing (interventions implemented as appropriate)    Goal: Skin Health and Integrity  Outcome: Ongoing (interventions implemented as appropriate)

## 2019-10-19 NOTE — SIGNIFICANT NOTE
"Tried to place patient on AVAPS, however patient kept pulling mask off. States, \"I cannot wear this mask. Get it off my face.\"  "

## 2019-10-20 NOTE — PROGRESS NOTES
[facility] Medicine Services Daily Progress Note      Hospitalist Team  LOS 6 days      Patient Care Team:  Florentino Kimbrough MD as PCP - General (Family Medicine)        Chief Complaint / Subjective  Chief Complaint   Patient presents with   • Abnormal Lab     Patient decompensated again overnight.  Became hypotensive and somnolent.  Received multiple small fluid boluses along with IV albumin.  Patient was agitated and kept taking off oxygen mask.  Attempts to place patient on BiPAP are met with patient refusal.  Patient seen this morning and awake but appearing more confused.  Patient's blood pressures systolics in the 50s.  Patient with a lactate of 8.9.  Patient still on meropenem at this time.  Fast team called and discussed with intensivist nurse practitioner on today, patient likely to decompensate further.  Patient transferred to the ICU and will be assumed care by intensivist.    Brief Synopsis of Hospital Course/HPI    Dinora Vásquez is a 89 y.o. female who was presented to the ER after being seen earlier by Nephrology and found to be hypotensive and tachycardic; in addition she complained of dyspnea, abdominal pain and not feeling well.  She had a bolus of IVFs started and transferred to the ER.  Workup in the ER revealed afib with RVR, UTI, septic shock, CHF, acute hypoxic respiratory failure and LEYLA.  She had IVFs per sepsis protocol given and started on Meropenem.  She was given a bolus of amio and Cardizem without improvement in her heart rate and subsequently developed hypotension.  She had a PICC ordered by the ER and was started on Levophed.   The patient is currently difficult to get a history from as she is obtunded.  Her sats were in the 70's and she was started on Precision Flow with improvement.       10/15/19: Short of breath with minimal activity.  Remains on Precision flow supplemental oxygen.  No chest pains.  No nausea or vomiting  10/16/19:  Improved today  on 8L high flow nasal cannula.  HR remains 120s.  No abdominal pain.  No CP.  10/17/19:  No issues overnight.  Off vasopressor.  The hospitalists were consulted for medical management.  The patient states she is doing well.  She reports mild shortness of breath.  She denies chest pain, nausea, vomiting, abdominal pain.           Review of systems negative for all other 10 systems    Review of Systems   Constitution: Negative for chills and fever.   HENT: Negative for congestion and sore throat.    Cardiovascular: Negative for chest pain and palpitations.   Respiratory: Positive for shortness of breath. Negative for hemoptysis.    Gastrointestinal: Negative for nausea and vomiting.   Neurological: Negative for dizziness and headaches.   Psychiatric/Behavioral: Negative for hallucinations. The patient does not have insomnia.        Family History   Problem Relation Age of Onset   • Heart disease Mother    • Diabetes Mother    • Heart disease Sister    • Diabetes Sister        Past Medical History:   Diagnosis Date   • Ankle wound, right, initial encounter    • Aortic stenosis    • Atrial fibrillation (CMS/HCC)    • Coronary artery disease    • DVT (deep venous thrombosis) (CMS/HCC)    • Hypothyroidism    • Left bundle branch block    • Pulmonary embolus (CMS/HCC)    • Pulmonary hypertension (CMS/HCC)    • Sinus bradycardia        Social History     Socioeconomic History   • Marital status:      Spouse name: Not on file   • Number of children: Not on file   • Years of education: Not on file   • Highest education level: Not on file   Occupational History   • Occupation: Retired   Tobacco Use   • Smoking status: Never Smoker   • Smokeless tobacco: Never Used   Substance and Sexual Activity   • Alcohol use: No     Frequency: Never   • Drug use: No           Objective      Vital Signs  Temp:  [96.4 °F (35.8 °C)-97.6 °F (36.4 °C)] 97.4 °F (36.3 °C)  Heart Rate:  [] 93  Resp:  [14-24] 24  BP: (81-96)/(59-72)  "81/63  Oxygen Therapy  SpO2: (!) 67 %  Pulse Oximetry Type: Continuous  Device (Oxygen Therapy): high-flow nasal cannula  $ High Flow Nasal Cannula Set-Up: yes  Flow (L/min): 7  Oxygen Concentration (%): 85(patient mouth breathing and decrease in sats)  Flowsheet Rows      First Filed Value   Admission Height  160 cm (63\") Documented at 10/14/2019 1551   Admission Weight  48.3 kg (106 lb 7.7 oz) Documented at 10/14/2019 1551        Intake & Output (last 3 days)       10/17 0701 - 10/18 0700 10/18 0701 - 10/19 0700 10/19 0701 - 10/20 0700 10/20 0701 - 10/21 0700    P.O. 240       I.V. (mL/kg)   1000 (16)     IV Piggyback  200 100     Total Intake(mL/kg) 240 (3.9) 200 (3.2) 1100 (17.6)     Urine (mL/kg/hr) 100 (0.1) 850 (0.6) 450 (0.3)     Stool 100  250     Total Output 200 850 700     Net +40 -650 +400             Urine Unmeasured Occurrence 2 x           Lines, Drains & Airways    Active LDAs     Name:   Placement date:   Placement time:   Site:   Days:    PICC Triple Lumen 10/14/19 Right Basilic   10/14/19    1828    Basilic   3    Ileostomy RLQ   10/14/19    2000    RLQ   3    External Urinary Catheter   10/16/19    0900    --   2                  Physical Exam:     General: Elderly female lying in bed with nasal cannula, somnolent  HEENT: NC/AT, EOMI, mucosa moist  Heart: Irregular.  No gallops  Chest: Significant increased work of breathing, crackles noted  Abdominal: Soft. NT/ND.  Musculoskeletal: Normal ROM.  No cyanosis no calf tenderness.  Neurological: AAOx3, no focal deficits  Skin: Skin is warm and dry.  Pallor  Psychiatric: Somnolent but agitated when trying to place supplemental oxygen    Procedures:              Results Review:     I reviewed the patient's new clinical results.  reviewed    Results from last 7 days   Lab Units 10/20/19  0543 10/19/19  0423 10/18/19  0356  10/16/19  0400 10/15/19  0424   WBC 10*3/mm3 16.80* 13.40* 13.90*   < > 20.50* 24.90*   HEMOGLOBIN g/dL 12.1 11.4* 11.7*   < > " 11.0* 11.2*   HEMATOCRIT % 38.2 36.4 37.7   < > 34.2 36.4   PLATELETS 10*3/mm3 147 120* 139*   < > 160 185   MONOCYTES % %  --   --   --   --  7.0 6.0    < > = values in this interval not displayed.     Results from last 7 days   Lab Units 10/20/19  0543 10/19/19  0423 10/18/19  0356  10/15/19  1121  10/14/19  1647 10/14/19  1619   SODIUM mmol/L 143 143 137   < > 146*   < > 144 142   POTASSIUM mmol/L 5.1 4.1 4.2   < > 4.2   < > 5.2* 5.2*   CHLORIDE mmol/L 99 99 99   < > 104   < > 102 103   CO2 mmol/L 23.0 32.0* 21.0*   < > 23.0   < > 14.0* 12.0*   BUN mg/dL 58* 44* 39*   < > 68*   < > 71* 71*   CREATININE mg/dL 2.31* 1.68* 1.70*   < > 3.18*   < > 3.80* 4.00*   CALCIUM mg/dL 7.9* 7.7* 7.8*   < > 7.3*   < > 8.2* 8.0*   BILIRUBIN mg/dL  --   --   --   --  1.4*  --  2.3* 2.1*   ALK PHOS U/L  --   --   --   --  94  --  97* 97*   ALT (SGPT) U/L  --   --   --   --  27  --  <5* <5*   AST (SGOT) U/L  --   --   --   --  78*  --  51* 51*   GLUCOSE mg/dL 90 124* 170*   < > 213*   < > 142* 152*    < > = values in this interval not displayed.     Results from last 7 days   Lab Units 10/20/19  0543 10/19/19  0423 10/18/19  0356   MAGNESIUM mg/dL 1.9 1.7 2.1     Lab Results   Component Value Date    CALCIUM 7.9 (L) 10/20/2019    PHOS 6.8 (H) 10/20/2019     No results found for: HGBA1C  Results from last 7 days   Lab Units 10/19/19  0423 10/18/19  0356 10/16/19  0400   INR  1.84* 1.60* 2.10*       Results from last 7 days   Lab Units 10/20/19  1410   PH, ARTERIAL pH units 7.284*   PO2 ART mm Hg 342.6*   PCO2, ARTERIAL mm Hg 37.1   HCO3 ART mmol/L 17.6*         Microbiology Results (last 10 days)     Procedure Component Value - Date/Time    MRSA Screen Culture - Swab, Nares [[id number]]  (Normal) Collected:  10/14/19 2036    Lab Status:  Final result Specimen:  Swab from Nares Updated:  10/15/19 2036     MRSA SCREEN CX No Methicillin Resistant Staphylococcus aureus isolated    Respiratory Panel, PCR - Swab, Nasopharynx [[id number]]   (Normal) Collected:  10/14/19 1951    Lab Status:  Final result Specimen:  Swab from Nasopharynx Updated:  10/14/19 2236     ADENOVIRUS, PCR Not Detected     Coronavirus 229E Not Detected     Coronavirus HKU1 Not Detected     Coronavirus NL63 Not Detected     Coronavirus OC43 Not Detected     Human Metapneumovirus Not Detected     Human Rhinovirus/Enterovirus Not Detected     Influenza B PCR Not Detected     Parainfluenza Virus 1 Not Detected     Parainfluenza Virus 2 Not Detected     Parainfluenza Virus 3 Not Detected     Parainfluenza Virus 4 Not Detected     Bordetella pertussis pcr Not Detected     Influenza A H1 2009 PCR Not Detected     Chlamydophila pneumoniae PCR Not Detected     Mycoplasma pneumo by PCR Not Detected     Influenza A PCR Not Detected     Influenza A H3 Not Detected     Influenza A H1 Not Detected     RSV, PCR Not Detected    S. Pneumo Ag Urine or CSF - Urine, Urine, Catheter [[id number]]  (Normal) Collected:  10/14/19 1929    Lab Status:  Final result Specimen:  Urine, Catheter Updated:  10/15/19 0831     Strep Pneumo Ag Negative    Legionella Antigen, Urine - Urine, Urine, Catheter [[id number]]  (Normal) Collected:  10/14/19 1929    Lab Status:  Final result Specimen:  Urine, Catheter Updated:  10/15/19 0830     LEGIONELLA ANTIGEN, URINE Negative    Urine Culture - Urine, Urine, Catheter [[id number]]  (Abnormal)  (Susceptibility) Collected:  10/14/19 1929    Lab Status:  Final result Specimen:  Urine, Catheter Updated:  10/16/19 1248     Urine Culture >100,000 CFU/mL Escherichia coli ESBL     Comment:   Consider infectious disease consult.  Susceptibility results may not correlate to clinical outcomes.       Susceptibility      Escherichia coli ESBL     WESLEY     Gentamicin Resistant     Levofloxacin Resistant     Meropenem Susceptible     Nitrofurantoin Susceptible     Piperacillin + Tazobactam Susceptible     Tetracycline Resistant     Trimethoprim + Sulfamethoxazole Resistant                     Blood Culture - Blood, Blood, Venous Line [[id number]] Collected:  10/14/19 1716    Lab Status:  Final result Specimen:  Blood, Venous Line Updated:  10/19/19 1730     Blood Culture No growth at 5 days    Blood Culture - Blood, Arm, Right [[id number]]  (Abnormal) Collected:  10/14/19 1647    Lab Status:  Final result Specimen:  Blood from Arm, Right Updated:  10/18/19 0909     Blood Culture Corynebacterium species     Comment: Probable contaminant requires clinical correlation, susceptibility not performed unless requested by physician.            Isolated from Aerobic Bottle     Gram Stain Aerobic Bottle Gram positive bacilli resembling diphtheroids    Narrative:       Blood culture does not meet the specified criteria for PCR identification.  If pregnant, immunocompromised, or clinical concern for meningitis, call lab to run BCID for Listeria monocytogenes.          ECG/EMG Results (most recent)     Procedure Component Value Units Date/Time    ECG 12 Lead [[id number]] Collected:  10/14/19 1620     Updated:  10/15/19 0639    Narrative:       HEART RATE= 125  bpm  RR Interval= 481  ms  SD Interval= 141  ms  P Horizontal Axis=   deg  P Front Axis= 0  deg  QRSD Interval= 71  ms  QT Interval= 345  ms  QRS Axis= -22  deg  T Wave Axis= 37  deg  - ABNORMAL ECG -  Atrial Fibrillation  Inferior infarct, old  Compare to previous ventricle response decreased  When compared with ECG of 15-Feb-2019 0:28:46,  New or worsened ischemia or infarction  Electronically Signed By: Jossue Donovan ([facility]) 15-Oct-2019 06:39:40  Date and Time of Study: 2019-10-14 16:20:23    ECG 12 Lead [[id number]] Collected:  10/14/19 1602     Updated:  10/15/19 0640    Narrative:       HEART RATE= 187  bpm  RR Interval= 320  ms  SD Interval=   ms  P Horizontal Axis=   deg  P Front Axis= 0  deg  QRSD Interval= 131  ms  QT Interval= 296  ms  QRS Axis= -82  deg  T Wave Axis= 151  deg  - ABNORMAL ECG -  Wide-QRS tachycardia  When compared with ECG of  15-Feb-2019 0:28:46,  Significant rate increase  Electronically Signed By: Jossue Donovan ([facility]) 15-Oct-2019 06:40:17  Date and Time of Study: 2019-10-14 16:02:33    Adult Transthoracic Echo Complete W/ Cont if Necessary Per Protocol [[id number]] Collected:  10/15/19 0733     Updated:  10/16/19 0839     BSA 1.6 m^2       CV ECHO FEDERICO - RVDD 2.5 cm      IVSd 0.85 cm      LVIDd 3.6 cm      LVIDs 2.4 cm      LVPWd 0.9 cm      IVS/LVPW 0.94     FS 32.6 %      EDV(Teich) 54.0 ml      ESV(Teich) 20.6 ml      EF(Teich) 61.9 %      EDV(cubed) 46.2 ml      ESV(cubed) 14.2 ml      EF(cubed) 69.3 %      LV mass(C)d 88.9 grams      LV mass(C)dI 55.8 grams/m^2      SV(Teich) 33.4 ml      SI(Teich) 21.0 ml/m^2      SV(cubed) 32.0 ml      SI(cubed) 20.1 ml/m^2      Ao root diam 2.6 cm      Ao root area 5.2 cm^2      ACS 1.6 cm      LVOT diam 1.8 cm      LVOT area 2.4 cm^2      RVOT diam 2.4 cm      RVOT area 4.4 cm^2      EDV(MOD-sp4) 31.5 ml      ESV(MOD-sp4) 14.5 ml      EF(MOD-sp4) 54.0 %      SV(MOD-sp4) 17.0 ml      SI(MOD-sp4) 10.7 ml/m^2      Ao root area (BSA corrected) 1.6     LV [place]or Vol (BSA corrected) 19.8 ml/m^2      LV Sys Vol (BSA corrected) 9.1 ml/m^2      MV E max jermaine 81.7 cm/sec      MV V2 max 83.7 cm/sec      MV max PG 2.8 mmHg      MV V2 mean 44.4 cm/sec      MV mean PG 1.1 mmHg      MV V2 VTI 13.8 cm      MVA(VTI) 1.8 cm^2      MV dec time 0.2 sec      LV V1 max PG 1.2 mmHg      LV V1 mean PG 0.59 mmHg      LV V1 max 54.8 cm/sec      LV V1 mean 35.8 cm/sec      LV V1 VTI 10.4 cm      MR max jermaine 255.2 cm/sec      MR max PG 26.1 mmHg      SV(LVOT) 25.4 ml      SV(RVOT) 34.2 ml      SI(LVOT) 15.9 ml/m^2      PA V2 max 61.0 cm/sec      PA max PG 1.5 mmHg      PA max PG (full) 0.43 mmHg       CV ECHO FEEDRICO - PVA(V,A) 3.7 cm^2      BH CV ECHO FEDERICO - PVA(V,D) 3.7 cm^2      RV V1 max PG 1.1 mmHg      RV V1 mean PG 0.44 mmHg      RV V1 max 51.4 cm/sec      RV V1 mean 30.7 cm/sec      RV V1 VTI 7.8 cm      TR max jermaine  291.7 cm/sec      RVSP(TR) 37.0 mmHg      RAP systole 3.0 mmHg      Qp/Qs 1.3      CV ECHO FEDERICO - BZI_BMI 22.5 kilograms/m^2       CV ECHO FEDERICO - BSA(HAYCOCK) 1.6 m^2       CV ECHO FEDERICO - BZI_METRIC_WEIGHT 57.6 kg       CV ECHO FEDERICO - BZI_METRIC_HEIGHT 160.0 cm      EF(MOD-bp) 54.0 %      LA dimension(2D) 4.0 cm      Echo EF Estimated 60 %     Narrative:       · Estimated EF = 60%.  · Left ventricular systolic function is normal.  · Right ventricular cavity is moderately dilated.  · Left atrial cavity size is mild-to-moderately dilated.  · There is calcification of the aortic valve.  · Mild-to-moderate mitral valve regurgitation is present  · Mild tricuspid valve regurgitation is present.                  Results for orders placed during the hospital encounter of 10/14/19   Adult Transthoracic Echo Complete W/ Cont if Necessary Per Protocol    Narrative · Estimated EF = 60%.  · Left ventricular systolic function is normal.  · Right ventricular cavity is moderately dilated.  · Left atrial cavity size is mild-to-moderately dilated.  · There is calcification of the aortic valve.  · Mild-to-moderate mitral valve regurgitation is present  · Mild tricuspid valve regurgitation is present.          Ct Abdomen Pelvis Without Contrast    Result Date: 10/14/2019   1. Subtotal colectomy. 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 The subcutaneous portion of the ileal loop is slightly redundant however there is no small bowel distention to suggest obstruction. There are some scattered small bowel air-fluid levels in nondistended loops which could reflect an associated mild ileus or enteritis. 2. Moderate to large bilateral pleural effusions and dependent bibasilar atelectasis. In addition there is a mild amount of ascites deep in the pelvis and there is diffuse increased soft tissue stranding in the subcutaneous fat and mesenteric fat. The constellation of these findings does raise concern for changes of  congestive heart failure. 3. Incidental note is made of a 3 cm right lateral hernia containing mesenteric fat.  Electronically Signed By-Ulysses Carpenter On:10/14/2019 7:16 PM This report was finalized on [id number] by  Ulysses Carpenter, .    Us Renal Limited    Result Date: 10/15/2019  Unremarkable retroperitoneal ultrasound examination. No evidence for hydronephrosis. Electronically signed by:  Jw Weiner M.D.  10/15/2019 3:23 AM    Xr Chest 1 View    Result Date: 10/20/2019  Limited study demonstrating similar-appearing moderate bilateral pleural effusions with underlying bibasilar atelectasis and/or pneumonia. Interstitial edema may be resolved.  Electronically Signed By-Kwabena Meza On:10/20/2019 10:26 AM This report was finalized on [id number] by  Kwabena Meza, .    Xr Chest 1 View    Result Date: 10/19/2019  1.Moderate to large bilateral pleural effusions, similar to the most recent prior exam, but increased compared to 10/14/2019. There are diffuse interstitial opacities and findings suggest pulmonary edema/volume overload. 2.Bibasilar airspace opacities and consolidation may be related to atelectasis, edema, or infiltrate. 3.Cardiomegaly. 4.Right arm PICC terminates in the mid SVC.  Electronically Signed By-DR. Reilly Cody MD On:10/19/2019 8:37 AM This report was finalized on [id number] by DR. Reilly Cody MD.    Xr Chest 1 View    Result Date: 10/18/2019  1.  Markedly increased basilar consolidation and pleural fluid, likely edema. 2.  Right PICC line tip is in the SVC. Electronically signed by:  Jw Weiner M.D.  10/18/2019 3:25 AM    Xr Chest 1 View    Result Date: 10/14/2019  Under 1. PICC line in good position with the tip in the superior vena cava. 2. Findings suggesting changes of pulmonary edema and congestive failure which are worsened from the previous study.  Electronically Signed By-Ulysses Carpenter On:10/14/2019 7:03 PM This report was finalized on [id number] by   Ulysses Carpenter, .    Xr Chest 1 View    Result Date: 10/14/2019  Renomegaly. Basilar infiltrates and effusions. The appearance is somewhat more suggestive of changes of pulmonary edema and congestive failure. An underlying pneumonia cannot be excluded.  Electronically Signed By-Ulysses Carpenter On:10/14/2019 5:14 PM This report was finalized on [id number] by  Ulysses Carpenter, .    Xr Abdomen Kub    Result Date: 10/20/2019  Nonspecific, nonobstructive bowel gas pattern with a relative paucity of bowel gas.  Electronically Signed By-Kwabena Meza On:10/20/2019 10:27 AM This report was finalized on [id number] by  Kwabena Meza, .    Xr Abdomen Kub    Result Date: 10/15/2019  1.Tip of the enteric tube terminates in the left mid abdomen, likely in the gastric body. 2.Nonspecific upper abdominal bowel gas pattern. 3.Moderate bilateral pleural effusions with underlying atelectasis.  Electronically Signed By-Gloria Heath On:10/15/2019 8:37 AM This report was finalized on [id number] by  Gloria Heath, .      Xrays, labs reviewed personally by physician.    Medication Review:   I have reviewed the patient's current medication list  reviewed    Scheduled Meds    apixaban 2.5 mg Oral Q12H   dexamethasone 1 drop Both Eyes Q8H   furosemide 40 mg Intravenous Q6H   insulin lispro 0-7 Units Subcutaneous 4x Daily With Meals & Nightly   levothyroxine 75 mcg Oral QAM AC   meropenem 1 g Intravenous Q12H   mirtazapine 7.5 mg Oral Nightly   polyethyl glycol-propyl glycol 1 drop Both Eyes BID   prenatal vitamin 27-0.8 1 tablet Oral Daily   sertraline 25 mg Oral Daily   sodium chloride 10 mL Intravenous Q12H       Meds Infusions    norepinephrine 0.02-0.3 mcg/kg/min Last Rate: 0.5 mcg/kg/min (10/20/19 5625)   Pharmacy to Dose meropenem (MERREM)     Pharmacy to dose vancomycin     sodium chloride 100 mL/hr Last Rate: 100 mL/hr (10/19/19 2130)   vasopressin 0.03 Units/min        Meds PRN  dextrose  •  dextrose  •  glucagon (human  recombinant)  •  metoprolol tartrate  •  ondansetron  •  Pharmacy to Dose meropenem (MERREM)  •  Pharmacy to dose vancomycin  •  sodium chloride  •  sodium chloride  •  vancomycin  •  vasopressin        Assessment / Plan    Active Hospital Problems    Diagnosis  POA   • Atrial fibrillation with RVR (CMS/[facility]) [I48.91]  Unknown   • Acute on chronic renal failure (CMS/[facility]) [N17.9, N18.9]  Unknown   • Elevated brain natriuretic peptide (BNP) level [R79.89]  Unknown   • Elevated troponin [R79.89]  Unknown      Resolved Hospital Problems    Diagnosis Date Resolved POA   • Septic shock (CMS/[facility]) [A41.9, R65.21] 10/17/2019 Yes     Patient is a 89-year-old female with history of atrial fibrillation presented in septic shock with A. fib with RVR likely secondary to uropathogen with acute decompensation again    Shortness of breath -with acute decline overnight.  Likely multifactorial given patient A. fib with RVR and signs of pleural effusion along with being septic shock.  Possible residual shock picture.  However patient also shows signs of fluid overload and acute worsening may be secondary to pulmonary edema  -Wean high flow  -Treatment for sepsis below  -Received Lasix 40 mg IV x1 currently, improvement in her lactate  -Given congestive cardiomyopathy picture along with renal insufficiency may fit cardio renal syndrome picture, if continues to decline may consider Lasix with low-dose dopamine  -If continues to decompensate may require transfer back to the ICU  -Elevated proBNP at 26,000  -Patient transferred to ICU  -Haldol given to assist with patient agitation and placement of BiPAP     Hypotension -uncertain source may be due to worsening sepsis  -Colloids and crystalloids  -Transfer to ICU    Septic shock likely related to urosepsis with hx of ESBL E. coli UTI  - urine culture positive for E. Coli  - lactic acid 9.1 on admission now back up to 7 and most recently down to 2.1, now normalized  - continue meropenem  -  patient previously on Vancomycin, discontinued   - diabetic diet   - Could consider vitamin C thiamine and hydrocortisone if patient declines again     Lactic acidosis -multifactorial.  May be due to lack of perfusion given congested cardiomyopathy versus sympathetic overdrive given septic picture.  Has oscillated significantly for patient.  No definitive history of cirrhosis or hepatic failure  -Trend cautiously    Acute hypoxic respiratory failure -see shortness of breath likely multifactorial  - CXR: pulmonary edema   - respiratory viral panel negative     Leukocytosis, mild  - WBC 13.1  - monitor      Hyperkalemia- resolved may be secondary to acute renal injury  -Potassium is 4.1 today  - insulin, calcium, D50 and bicarb given on admission  - monitor      Elevated troponin -given patient's cardiac history and age along with septic shock most likely demand ischemia  - troponin 0.34, 0.34, 0.43  - cardiology following, continue conservative treatment     Acute on chronic kidney disease stage III -monitor for cardiorenal component  - baseline Cr 1.4  - BUN 48 / Cr 2.05  - renal ultrasound negative  - nephrology following   - off bicarb drip   -Off IV fluids given acute worsening of respiratory status     Atrial fibrillation with RVR s/p permanent pacemaker   - TTE: EF 60%  - continue amiodarone drip per cardiology   - metoprolol 12.5 mg BID added   - on chronic anticoagulation with Eliquis  - cardiac monitoring   - Appreciate cardiology input     Possible ileus noted on CT -no abdominal complaints currently  - patient had NG tube placed, since removed      H/o Hypertension -as blood pressure improves resume home medications  -Home meds     H/o LLE DVT and PE -currently anticoagulated secondary to atrial fibrillation     Depression  - continue home Zoloft and Remeron      Hypothyroidism  - continue home synthroid      CHF -we will continue to monitor cardiopulmonary status     H/o ileostomy r/t C. Diff     VTE  prophylaxis - bilateral SCDs, Eliquis     Disposition -inpatient, transfer to ICU due to decompensation    Andrea Alvarez MD  10/20/19  4:58 PM

## 2019-10-20 NOTE — PROGRESS NOTES
Pulmonary/ Critical Care progress Note        Patient Name:  Dinora Vásquez    :  1926    Medical Record:  [id number]    Primary Care Physician     Florentino Kimbrough MD [facility]  Dinora Vásquez is a 89 y.o. female who was presented to the ER after being seen earlier by Nephrology and found to be hypotensive and tachycardic; in addition she complained of dyspnea, abdominal pain and not feeling well.  She had a bolus of IVFs started and transferred to the ER.  Workup in the ER revealed afib with RVR, UTI, septic shock, CHF, STEMI, acute hypoxic respiratory failure and LELYA.  She had IVFs per sepsis protocol given and started on Meropenem.  She was given a bolus of amio and Cardizem without improvement in her heart rate and subsequently developed hypotension.  She had a PICC ordered by the ER and was started on Levophed.   The patient is currently difficult to get a history from as she is obtunded.  Her sats were in the 70's and she was started on Precision Flow with improvement.      10/15/19: Short of breath with minimal activity.  Remains on Precision flow supplemental oxygen.  No chest pains.  No nausea or vomiting  10/16/19:  Improved today on 8L high flow nasal cannula.  HR remains 120s.  No abdominal pain.  No CP  10/17/19:  No issues overnight.  Off vasopressor   10/18: reports feeling somewhat better.  Tolerating O2 7L NC.  Continued on amiodarone gtt, denies chest pain.  Required continuous AVAP over night for hypercapnia, now improving. Denies SOA at rest   10/20: Patient was a fast call this morning for hypotension and tachypnea.  Transferred to the ICU for vasopressor support and further evaluation.  Currently on AVAP and resting comfortably.  Currently on Levophed and vasopressin.    Review of Systems    As above        Home medicationS  Prior to Admission medications    Medication Sig Start Date End Date Taking? Authorizing Provider   cholecalciferol (VITAMIN D3) 1000 units tablet Daily.  9/29/17   Aravind Silva MD   Cranberry 600 MG tablet Take 600 mg by mouth 3 (Three) Times a Day.    Aravind Silva MD   ELIQUIS 2.5 MG tablet tablet Take 2.5 mg by mouth 2 (Two) Times a Day. 6/24/19   Aravind Silva MD   fluorometholone (FML) 0.1 % ophthalmic suspension Administer 1 Drop/kg to both eyes Daily. 5/21/19   Aravind Silva MD   levothyroxine (SYNTHROID, LEVOTHROID) 75 MCG tablet Every Morning Before Breakfast. 6/11/19   Aravind Silva MD   loratadine (CLARITIN) 10 MG tablet Take 10 mg by mouth Daily.    Aravind Silva MD   Magnesium 400 MG tablet MAGNESIUM 400 MG TABS 11/2/18   Aravind Silva MD   metoprolol tartrate (LOPRESSOR) 50 MG tablet Take 50 mg by mouth 2 (Two) Times a Day. 6/11/19   Aravind Silva MD   mirtazapine (REMERON) 7.5 MG tablet 7.5 mg Daily. 6/18/19   Aravind Silva MD   polyvinyl alcohol (ARTIFICIAL TEARS) 1.4 % ophthalmic solution Every 12 (Twelve) Hours. 11/2/18   Aravind Silva MD   Prenatal w/o A Vit-Fe Fum-FA (BP MULTINATAL PLUS) 30-1 MG tablet Daily. 12/19/17   Aravind Silva MD   saccharomyces boulardii (FLORASTOR) 250 MG capsule FLORASTOR 250 MG CAPS 6/11/18   Aravind Silva MD   sertraline (ZOLOFT) 25 MG tablet Take 25 mg by mouth Daily. 6/11/19   Aravind Silva MD       Medical History    Past Medical History:   Diagnosis Date   • Ankle wound, right, initial encounter    • Aortic stenosis    • Atrial fibrillation (CMS/HCC)    • Coronary artery disease    • DVT (deep venous thrombosis) (CMS/HCC)    • Hypothyroidism    • Left bundle branch block    • Pulmonary embolus (CMS/HCC)    • Pulmonary hypertension (CMS/HCC)    • Sinus bradycardia         Surgical History    Past Surgical History:   Procedure Laterality Date   • COLOSTOMY     • ORIF ANKLE FRACTURE Right 2013    Right Ankle ORIF    • PACEMAKER IMPLANTATION  11/29/2017    Dual Chamber [employer]   • TOTAL ABDOMINAL  HYSTERECTOMY          Family History    Family History   Problem Relation Age of Onset   • Heart disease Mother    • Diabetes Mother    • Heart disease Sister    • Diabetes Sister        Social History    Social History     Tobacco Use   • Smoking status: Never Smoker   • Smokeless tobacco: Never Used   Substance Use Topics   • Alcohol use: No     Frequency: Never        Allergies    Allergies   Allergen Reactions   • Cephalexin Swelling   • Sulfadiazine Rash   • Trimethoprim Hives   • Trospium Hives       Medications    Scheduled Meds:    apixaban 2.5 mg Oral Q12H   dexamethasone 1 drop Both Eyes Q8H   insulin lispro 0-7 Units Subcutaneous 4x Daily With Meals & Nightly   levothyroxine 75 mcg Oral QAM AC   meropenem 500 mg Intravenous Q12H   metoprolol tartrate 50 mg Oral Q12H   mirtazapine 7.5 mg Oral Nightly   polyethyl glycol-propyl glycol 1 drop Both Eyes BID   potassium chloride 20 mEq Oral Daily   prenatal vitamin 27-0.8 1 tablet Oral Daily   sertraline 25 mg Oral Daily   sodium chloride 1,000 mL Intravenous Once   sodium chloride 10 mL Intravenous Q12H   vancomycin 1,250 mg Intravenous Once     Continuous Infusions:    norepinephrine 0.02-0.3 mcg/kg/min Last Rate: 0.5 mcg/kg/min (10/20/19 0825)   Pharmacy to Dose meropenem (MERREM)     Pharmacy to dose vancomycin     sodium chloride 100 mL/hr Last Rate: 100 mL/hr (10/19/19 2130)   vasopressin 0.03 Units/min      PRN Meds:.dextrose  •  dextrose  •  glucagon (human recombinant)  •  metoprolol tartrate  •  ondansetron  •  Pharmacy to Dose meropenem (MERREM)  •  Pharmacy to dose vancomycin  •  sodium chloride  •  sodium chloride  •  vasopressin      Physical Exam    tMax 24 hrs:  Temp (24hrs), Av °F (36.1 °C), Min:96.1 °F (35.6 °C), Max:97.6 °F (36.4 °C)    Vitals Ranges:  Temp:  [96.1 °F (35.6 °C)-97.6 °F (36.4 °C)] 97.6 °F (36.4 °C)  Heart Rate:  [] 93  Resp:  [14-24] 24  BP: ()/(59-80) 81/63  Intake and Output Last 3 Shifts:  I/O last 3  completed shifts:  In: 1300 [I.V.:1000; IV Piggyback:300]  Out: 700 [Urine:450; Stool:250]    Constitution:  NAD   HEENT:  Atraumatic, PERRL, conjunctiva normal, moist oral mucosa, no nasal discharge.  Trachea is midline.  Respiratory: Mildly short of air. Diminished breath sounds bilaterally.    Cardiovascular:  AFIB, irreg/irreg  GI:  Soft, nondistended.  tender to palpation.   Ileostomy  with stool output noted.   Extremities: No edema, cyanosis or tenderness.  Integument:  No rashes.   Neurologic: Drowsy and confused.  Moves all four extremities to follow some simple commands.   No focal neurologic deficits observed.      labs    Lab Results (last 24 hours)     Procedure Component Value Units Date/Time    Lactic Acid, Plasma [[id number]]  (Abnormal) Collected:  10/20/19 0546    Specimen:  Blood Updated:  10/20/19 0722     Lactate 8.3 mmol/L     Phosphorus [[id number]]  (Abnormal) Collected:  10/20/19 0543    Specimen:  Blood Updated:  10/20/19 0621     Phosphorus 6.8 mg/dL     Basic Metabolic Panel [[id number]]  (Abnormal) Collected:  10/20/19 0543    Specimen:  Blood Updated:  10/20/19 0621     Glucose 90 mg/dL      BUN 58 mg/dL      Creatinine 2.31 mg/dL      Sodium 143 mmol/L      Potassium 5.1 mmol/L      Chloride 99 mmol/L      CO2 23.0 mmol/L      Calcium 7.9 mg/dL      eGFR Non African Amer 20 mL/min/1.73      BUN/Creatinine Ratio 25.1     Anion Gap 21.0 mmol/L     Narrative:       GFR Normal >60  Chronic Kidney Disease <60  Kidney Failure <15    Magnesium [[id number]]  (Normal) Collected:  10/20/19 0543    Specimen:  Blood Updated:  10/20/19 0620     Magnesium 1.9 mg/dL     CBC & Differential [[id number]] Collected:  10/20/19 0543    Specimen:  Blood Updated:  10/20/19 0617    Narrative:       The following orders were created for panel order CBC & Differential.  Procedure                               Abnormality         Status                     ---------                               -----------          ------                     CBC Auto Differential[[id number]]        Abnormal            Final result                 Please view results for these tests on the individual orders.    CBC Auto Differential [[id number]]  (Abnormal) Collected:  10/20/19 0543    Specimen:  Blood Updated:  10/20/19 0617     WBC 16.80 10*3/mm3      RBC 3.84 10*6/mm3      Hemoglobin 12.1 g/dL      Hematocrit 38.2 %      MCV 99.5 fL      MCH 31.5 pg      MCHC 31.7 g/dL      RDW 19.2 %      RDW-SD 67.4 fl      MPV 8.4 fL      Platelets 147 10*3/mm3      Neutrophil % 87.0 %      Lymphocyte % 2.3 %      Monocyte % 10.2 %      Eosinophil % 0.0 %      Basophil % 0.5 %      Neutrophils, Absolute 14.70 10*3/mm3      Lymphocytes, Absolute 0.40 10*3/mm3      Monocytes, Absolute 1.70 10*3/mm3      Eosinophils, Absolute 0.00 10*3/mm3      Basophils, Absolute 0.10 10*3/mm3      nRBC 0.3 /100 WBC     POC Glucose Once [[id number]]  (Abnormal) Collected:  10/19/19 2017    Specimen:  Blood Updated:  10/19/19 2021     Glucose 137 mg/dL      Comment: Serial Number: [id number]:  [id number]       Blood Culture - Blood, Blood, Venous Line [[id number]] Collected:  10/14/19 1716    Specimen:  Blood, Venous Line Updated:  10/19/19 1730     Blood Culture No growth at 5 days    POC Glucose Once [[id number]]  (Abnormal) Collected:  10/19/19 1653    Specimen:  Blood Updated:  10/19/19 1654     Glucose 135 mg/dL      Comment: Serial Number: [id number]:  [id number]       POC Glucose Once [[id number]]  (Abnormal) Collected:  10/19/19 1148    Specimen:  Blood Updated:  10/19/19 1151     Glucose 122 mg/dL      Comment: Serial Number: [id number]:  [id number]             Imaging & Other Studies    Imaging Results (last 72 hours)     Procedure Component Value Units Date/Time    CT Abdomen Pelvis Without Contrast [[id number]] Collected:  10/14/19 1912     Updated:  10/14/19 1925    Narrative:          DATE OF EXAM:  10/14/2019 6:53 PM     PROCEDURE:  CT ABDOMEN PELVIS  WO CONTRAST-     INDICATIONS:  pain; A41.9-Sepsis, unspecified organism; R65.21-Severe sepsis with  septic shock     COMPARISON:  CT scan of the abdomen and pelvis 02/16/2019     TECHNIQUE:  Routine transaxial slices were obtained through the abdomen and pelvis  without the administration of intravenous contrast. Reconstructed  coronal and sagittal images were also obtained. Automated exposure  control and iterative construction methods were used.     FINDINGS:  There are moderate to large bilateral pleural effusions with associated  bibasilar dependent subsegmental atelectasis which are worsened from the  previous CT scan. There is a transvenous pacemaker. The liver and spleen  and adrenal glands and pancreas and kidneys appear normal. There is free  fluid deep in the pelvis unusual for a patient of this age. The patient  appears to have had a subtotal colectomy with only the rectum and distal  most aspect of the sigmoid colon remaining. There are scattered small  bowel air-fluid levels in nondistended loops. The small bowel within the  subcutaneous tissues passing from the abdominal wall to the skin does  show some redundancy however there is no significant small bowel  distention to suggest obstruction. There is mild diffuse increased soft  tissue stranding in the subcutaneous fat and mesenteric fat. There are  degenerative changes of the lumbar spine.       Impression:          1. Subtotal colectomy. There is an ileostomy in the right lower  quadrant. The subcutaneous portion of the ileal loop is slightly  redundant however there is no small bowel distention to suggest  obstruction. There are some scattered small bowel air-fluid levels in  nondistended loops which could reflect an associated mild ileus or  enteritis.  2. Moderate to large bilateral pleural effusions and dependent bibasilar  atelectasis. In addition there is a mild amount of ascites deep in the  pelvis and there is diffuse increased soft tissue  stranding in the  subcutaneous fat and mesenteric fat. The constellation of these findings  does raise concern for changes of congestive heart failure.  3. Incidental note is made of a 3 cm right lateral hernia containing  mesenteric fat.     Electronically Signed ByIsrael Carpenter On:10/14/2019 7:16 PM  This report was finalized on [id number] by  Ulysses Carpenter, .    XR Chest 1 View [[id number]] Collected:  10/14/19 1902     Updated:  10/14/19 1905    Narrative:       DATE OF EXAM:  10/14/2019 6:45 PM     PROCEDURE:  XR CHEST 1 VW-     INDICATIONS:  picc tip verification     COMPARISON: Chest x-ray 10/14/2019 at 5:00 PM     TECHNIQUE:   Single radiographic AP view of the chest was obtained.     FINDINGS:  There is been placement of a right PICC line with the tip in the  superior vena cava. There is cardiomegaly with a transvenous pacemaker.  There are extensive bilateral alveolar infiltrates and pleural effusions  with pulmonary vascular congestion which have worsened from the previous  study.        Impression:       Under  1. PICC line in good position with the tip in the superior vena cava.  2. Findings suggesting changes of pulmonary edema and congestive failure  which are worsened from the previous study.     Electronically Signed ByIsrael Carpenter On:10/14/2019 7:03 PM  This report was finalized on [id number] by  Ulysses Carpenter, .    XR Chest 1 View [[id number]] Collected:  10/14/19 1713     Updated:  10/14/19 1716    Narrative:       DATE OF EXAM:  10/14/2019 5:00 PM     PROCEDURE:  XR CHEST 1 VW-     INDICATIONS:  soa     COMPARISON:  Chest x-ray 02/11/2019     TECHNIQUE:   Single radiographic AP view of the chest was obtained.     FINDINGS:  There is cardiomegaly with a transvenous pacemaker. There is no  significant pulmonary vascular congestion however there are extensive  bibasilar areas of infiltrate and consolidation and effusion. There is a  possible PICC line on the right with the tip in the  right axillary vein.          Impression:       Renomegaly. Basilar infiltrates and effusions. The appearance is  somewhat more suggestive of changes of pulmonary edema and congestive  failure. An underlying pneumonia cannot be excluded.     Electronically Signed By-Ulysses Carpenter On:10/14/2019 5:14 PM  This report was finalized on [id number] by  Ulysses Carpenter, .          Assessment/plan  Septic shock likely related to urosepsis with hx of ESBL E. Coli UTI  Acute hypoxic respiratory failure  Leukocytosis   Bilateral pleural effusions   Hyperkalemia  LEYLA  Atrial fib with RVR  Acute hypoxic respiratory failure  H/o Hypertension  H/o LLE DVT and PE  New onset CHF  Chronic anticoagulation with Eliquis  ?Ileus  H/o ileostomy r/t C. Diff  NSTEMI    Plan:    -cont Merrem (hx of ESBL UTI).  Restart vancomycin  -currently on AVAP, cont to wean for sats 90%  -CXR with bilateral effusions and some vascular congestion  -Initial UA/culture with E. coli.  Blood culture negative  -Blood cultures x2 pending  -Urinalysis with reflex culture pending  -Sputum culture pending  -Serial lactic acid pending  -BNP 10/18 26,409  -ABGs pending  -Hold metoprolol 25 mg BID due to hypotension  -resp viral panel negative   -TTE reviewed, EF 60%  -troponin pending  -replace electrolytes as needed   -IVF bolus of 1449 in the ER  -Insulin, calcium, D50 and bicarb for hyperkalemia, K+ 4.3 given at admission  -Nephrology consulted  -OFF HCO3 gtt  -IVFs discontinued  -resumed Eliquis   -Renal ultrasound negative  -Cardiology consulted   -Diureses per renal  -passed swallow eval, diet ordered   -PT eval    PICC    PUD: Protonix  Insulin:  Sliding scale  VTE:  SCDs, Eliquis   Nutrition: Will hold p.o. intake while requiring AVAP    DNR      Attending physician statement:  Patient remains critically ill.  Total critical care time spent is 32 minutes which does not include any time for procedures.  Critical care time is exclusive of time spent by the  nurse practitioner.  Above note scribed by nurse practitioner for me and later reviewed/modified by me for accuracy . I've examined the patient and reviewed all labs and images.  I have directly participated in the evaluation and management of this patient.  Laz Ruelas MD  Pulmonary and [facility]  [facility]

## 2019-10-20 NOTE — SIGNIFICANT NOTE
Patient is hypotensive. Skin cool and dry. Denies nausea at this time. Patient has triggered the sepsis screen throughout the night. Order received from Leslie RUSHING for 500cc bolus. Will continue to monitor closely. B/P 81/63  HR-114 R-20 Oxygen sat 99%.

## 2019-10-20 NOTE — DISCHARGE SUMMARY
Pulmonary/ Critical Care/ sleep medicine death summary Note    Date of Discharge:  10/20/2019    Cause of Death:  Septic shock likely related to urosepsis with hx of ESBL E. Coli UTI  Acute hypoxic respiratory failure  Lifelong nonsmoker    Presenting Problem/History of Present Illness  Active Hospital Problems    Diagnosis  POA   • Atrial fibrillation with RVR (CMS/[facility]) [I48.91]  Unknown   • Acute on chronic renal failure (CMS/HCC) [N17.9, N18.9]  Unknown   • Elevated brain natriuretic peptide (BNP) level [R79.89]  Unknown   • Elevated troponin [R79.89]  Unknown      Resolved Hospital Problems    Diagnosis Date Resolved POA   • Septic shock (CMS/[facility]) [A41.9, R65.21] 10/17/2019 Yes     Hospital Course  Dinora Vásquez was a 89 y.o. female who presented to the ER after being seen earlier by Nephrology and found to be hypotensive and tachycardic; in addition she complained of dyspnea, abdominal pain and not feeling well.  She had a bolus of IVFs started and transferred to the ER.  Workup in the ER revealed afib with RVR, UTI, septic shock, CHF, STEMI, acute hypoxic respiratory failure and LEYLA.  She had IVFs per sepsis protocol given and started on Meropenem.  She was given a bolus of amio and Cardizem without improvement in her heart rate and subsequently developed hypotension.  She had a PICC ordered by the ER and was started on Levophed.   The patient was difficult to get a history from as she was obtunded.  Her sats were in the 70's and she was started on Precision Flow with improvement.       10/15/19: reported short of breath with minimal activity.  Remained on Precision flow supplemental oxygen.  No chest pains.  No nausea or vomiting  10/16/19:  Improved O2 on 8L high flow nasal cannula.  HR remained 120s.  No abdominal pain.  No CP  10/17/19:  No issues overnight.  Off vasopressor . Pt was able to be transferred out of the ICU  10/18: reported feeling somewhat better.  Tolerated O2 7L NC.  Continued on  "amiodarone gtt, denied chest pain.  Required continuous AVAP over night for hypercapnia, improved by morning. Denied SOA at rest   10/20: Patient was a fast call this morning for hypotension and tachypnea.  Transferred to the ICU for vasopressor support and further evaluation.  Initially required continuous AVAP and rested comfortably.  Blood pressure support with Levophed and vasopressin. As the afternoon progressed the patient was able to rest off of AVAP and was comfortable with precision flow.  She became increasingly restless and uncomfortable although denied SOA.  Family was at bedside and updated routinely.  Pt's speech became increasingly difficult.  She experience a sudden drop in blood pressure, decreased level of consciousness and agonal respirations.  The family was updated a bedside and were with the pt when she  shortly thereafter     Procedures Performed    10/20 1257 Note By: Laz Ruelas MD    Labs/radiological studies:  Lab Results (last 72 hours)     Procedure Component Value Units Date/Time    Procalcitonin [[id number]]  (Abnormal) Collected:  10/20/19 1549    Specimen:  Blood Updated:  10/20/19 1625     Procalcitonin 1.30 ng/mL     Narrative:       As a Marker for Sepsis (Non-Neonates):   1. <0.5 ng/mL represents a low risk of severe sepsis and/or septic shock.  1. >2 ng/mL represents a high risk of severe sepsis and/or septic shock.    As a Marker for Lower Respiratory Tract Infections that require antibiotic therapy:  PCT on Admission     Antibiotic Therapy             6-12 Hrs later  > 0.5                Strongly Recommended            >0.25 - <0.5         Recommended  0.1 - 0.25           Discouraged                   Remeasure/reassess PCT  <0.1                 Strongly Discouraged          Remeasure/reassess PCT      As 28 day mortality risk marker: \"Change in Procalcitonin Result\" (> 80 % or <=80 %) if Day 0 (or Day 1) and Day 4 values are available. Refer to " [email or web address]   Change in PCT <=80 %   A decrease of PCT levels below or equal to 80 % defines a positive change in PCT test result representing a higher risk for 28-day all-cause mortality of patients diagnosed with severe sepsis or septic shock.  Change in PCT > 80 %   A decrease of PCT levels of more than 80 % defines a negative change in PCT result representing a lower risk for 28-day all-cause mortality of patients diagnosed with severe sepsis or septic shock.        Lactic Acid, Plasma [[id number]]  (Abnormal) Collected:  10/20/19 1549    Specimen:  Blood Updated:  10/20/19 1612     Lactate 10.8 mmol/L     Blood Culture - Blood, Blood, Arterial Line [[id number]] Collected:  10/20/19 1549    Specimen:  Blood, Arterial Line Updated:  10/20/19 1550    Blood Gas, Arterial [[id number]]  (Abnormal) Collected:  10/20/19 1410    Specimen:  Arterial Blood Updated:  10/20/19 1414     Site Arterial Line     John's Test N/A     pH, Arterial 7.284 pH units      pCO2, Arterial 37.1 mm Hg      pO2, Arterial 342.6 mm Hg      HCO3, Arterial 17.6 mmol/L      Base Excess, Arterial -8.4 mmol/L      Comment: Serial Number: [id number]:  [id number]        O2 Saturation, Arterial 99.9 %      CO2 Content 18.8 mmol/L      Barometric Pressure for Blood Gas --     Comment: N/A        Modality BiPap     FIO2 100 %      Ventilator Mode ;VC     Set Tidal Volume 500     PEEP 5     Hemodilution No     Respiratory Rate 18    POC Glucose Once [[id number]]  (Abnormal) Collected:  10/20/19 1351    Specimen:  Blood Updated:  10/20/19 1352     Glucose 163 mg/dL      Comment: Serial Number: [id number]:  [id number]       POC Glucose Once [[id number]]  (Abnormal) Collected:  10/20/19 1220    Specimen:  Blood Updated:  10/20/19 1221     Glucose 56 mg/dL      Comment: Serial Number: [id number]:  [id number]       POC Glucose Once [[id number]]  (Abnormal) Collected:  10/20/19 1216    Specimen:  Blood Updated:  10/20/19 1219  "    Glucose 30 mg/dL      Comment: Serial Number: [id number]:  [id number]       Troponin [[id number]]  (Abnormal) Collected:  10/20/19 0543    Specimen:  Blood Updated:  10/20/19 1142     Troponin T 0.114 ng/mL     Narrative:       Troponin T Reference Range:  <= 0.03 ng/mL-   Negative for AMI  >0.03 ng/mL-     Abnormal for myocardial necrosis.  Clinicians would have to utilize clinical acumen, EKG, Troponin and serial changes to determine if it is an Acute Myocardial Infarction or myocardial injury due to an underlying chronic condition.     Troponin [[id number]]  (Abnormal) Collected:  10/20/19 1022    Specimen:  Blood Updated:  10/20/19 1139     Troponin T 0.132 ng/mL     Narrative:       Troponin T Reference Range:  <= 0.03 ng/mL-   Negative for AMI  >0.03 ng/mL-     Abnormal for myocardial necrosis.  Clinicians would have to utilize clinical acumen, EKG, Troponin and serial changes to determine if it is an Acute Myocardial Infarction or myocardial injury due to an underlying chronic condition.     Procalcitonin [[id number]]  (Abnormal) Collected:  10/20/19 1022    Specimen:  Blood Updated:  10/20/19 1114     Procalcitonin 1.28 ng/mL     Narrative:       As a Marker for Sepsis (Non-Neonates):   1. <0.5 ng/mL represents a low risk of severe sepsis and/or septic shock.  1. >2 ng/mL represents a high risk of severe sepsis and/or septic shock.    As a Marker for Lower Respiratory Tract Infections that require antibiotic therapy:  PCT on Admission     Antibiotic Therapy             6-12 Hrs later  > 0.5                Strongly Recommended            >0.25 - <0.5         Recommended  0.1 - 0.25           Discouraged                   Remeasure/reassess PCT  <0.1                 Strongly Discouraged          Remeasure/reassess PCT      As 28 day mortality risk marker: \"Change in Procalcitonin Result\" (> 80 % or <=80 %) if Day 0 (or Day 1) and Day 4 values are available. Refer to [email or web address]/ "   Change in PCT <=80 %   A decrease of PCT levels below or equal to 80 % defines a positive change in PCT test result representing a higher risk for 28-day all-cause mortality of patients diagnosed with severe sepsis or septic shock.  Change in PCT > 80 %   A decrease of PCT levels of more than 80 % defines a negative change in PCT result representing a lower risk for 28-day all-cause mortality of patients diagnosed with severe sepsis or septic shock.                Lactic Acid, Plasma [[id number]]  (Abnormal) Collected:  10/20/19 1022    Specimen:  Blood Updated:  10/20/19 1056     Lactate 9.7 mmol/L     Blood Culture - Blood, Blood, Central Line [[id number]] Collected:  10/20/19 1022    Specimen:  Blood, Central Line Updated:  10/20/19 1029    Lactic Acid, Plasma [[id number]]  (Abnormal) Collected:  10/20/19 0546    Specimen:  Blood Updated:  10/20/19 0722     Lactate 8.3 mmol/L     Phosphorus [[id number]]  (Abnormal) Collected:  10/20/19 0543    Specimen:  Blood Updated:  10/20/19 0621     Phosphorus 6.8 mg/dL     Basic Metabolic Panel [[id number]]  (Abnormal) Collected:  10/20/19 0543    Specimen:  Blood Updated:  10/20/19 0621     Glucose 90 mg/dL      BUN 58 mg/dL      Creatinine 2.31 mg/dL      Sodium 143 mmol/L      Potassium 5.1 mmol/L      Chloride 99 mmol/L      CO2 23.0 mmol/L      Calcium 7.9 mg/dL      eGFR Non African Amer 20 mL/min/1.73      BUN/Creatinine Ratio 25.1     Anion Gap 21.0 mmol/L     Narrative:       GFR Normal >60  Chronic Kidney Disease <60  Kidney Failure <15    Magnesium [[id number]]  (Normal) Collected:  10/20/19 0543    Specimen:  Blood Updated:  10/20/19 0620     Magnesium 1.9 mg/dL     CBC & Differential [[id number]] Collected:  10/20/19 0543    Specimen:  Blood Updated:  10/20/19 0617    Narrative:       The following orders were created for panel order CBC & Differential.  Procedure                               Abnormality         Status                     ---------                                -----------         ------                     CBC Auto Differential[[id number]]        Abnormal            Final result                 Please view results for these tests on the individual orders.    CBC Auto Differential [[id number]]  (Abnormal) Collected:  10/20/19 0543    Specimen:  Blood Updated:  10/20/19 0617     WBC 16.80 10*3/mm3      RBC 3.84 10*6/mm3      Hemoglobin 12.1 g/dL      Hematocrit 38.2 %      MCV 99.5 fL      MCH 31.5 pg      MCHC 31.7 g/dL      RDW 19.2 %      RDW-SD 67.4 fl      MPV 8.4 fL      Platelets 147 10*3/mm3      Neutrophil % 87.0 %      Lymphocyte % 2.3 %      Monocyte % 10.2 %      Eosinophil % 0.0 %      Basophil % 0.5 %      Neutrophils, Absolute 14.70 10*3/mm3      Lymphocytes, Absolute 0.40 10*3/mm3      Monocytes, Absolute 1.70 10*3/mm3      Eosinophils, Absolute 0.00 10*3/mm3      Basophils, Absolute 0.10 10*3/mm3      nRBC 0.3 /100 WBC     POC Glucose Once [[id number]]  (Abnormal) Collected:  10/19/19 2017    Specimen:  Blood Updated:  10/19/19 2021     Glucose 137 mg/dL      Comment: Serial Number: [id number]:  [id number]       Blood Culture - Blood, Blood, Venous Line [[id number]] Collected:  10/14/19 1716    Specimen:  Blood, Venous Line Updated:  10/19/19 1730     Blood Culture No growth at 5 days    POC Glucose Once [[id number]]  (Abnormal) Collected:  10/19/19 1653    Specimen:  Blood Updated:  10/19/19 1654     Glucose 135 mg/dL      Comment: Serial Number: [id number]:  [id number]       POC Glucose Once [[id number]]  (Abnormal) Collected:  10/19/19 1148    Specimen:  Blood Updated:  10/19/19 1151     Glucose 122 mg/dL      Comment: Serial Number: [id number]:  [id number]       POC Glucose Once [[id number]]  (Normal) Collected:  10/19/19 0737    Specimen:  Blood Updated:  10/19/19 0740     Glucose 102 mg/dL      Comment: Serial Number: [id number]:  [id number]       Basic Metabolic Panel [[id number]]  (Abnormal) Collected:  10/19/19 0422     Specimen:  Blood Updated:  10/19/19 0454     Glucose 124 mg/dL      BUN 44 mg/dL      Creatinine 1.68 mg/dL      Sodium 143 mmol/L      Potassium 4.1 mmol/L      Chloride 99 mmol/L      CO2 32.0 mmol/L      Calcium 7.7 mg/dL      eGFR Non African Amer 28 mL/min/1.73      BUN/Creatinine Ratio 26.2     Anion Gap 12.0 mmol/L     Narrative:       GFR Normal >60  Chronic Kidney Disease <60  Kidney Failure <15    Magnesium [[id number]]  (Normal) Collected:  10/19/19 0423    Specimen:  Blood Updated:  10/19/19 0449     Magnesium 1.7 mg/dL     Phosphorus [[id number]]  (Normal) Collected:  10/19/19 0423    Specimen:  Blood Updated:  10/19/19 0449     Phosphorus 4.2 mg/dL     Protime-INR [[id number]]  (Abnormal) Collected:  10/19/19 0423    Specimen:  Blood Updated:  10/19/19 0432     Protime 17.8 Seconds      INR 1.84    CBC & Differential [[id number]] Collected:  10/19/19 0423    Specimen:  Blood Updated:  10/19/19 0426    Narrative:       The following orders were created for panel order CBC & Differential.  Procedure                               Abnormality         Status                     ---------                               -----------         ------                     CBC Auto Differential[[id number]]        Abnormal            Final result                 Please view results for these tests on the individual orders.    CBC Auto Differential [[id number]]  (Abnormal) Collected:  10/19/19 0423    Specimen:  Blood Updated:  10/19/19 0426     WBC 13.40 10*3/mm3      RBC 3.69 10*6/mm3      Hemoglobin 11.4 g/dL      Hematocrit 36.4 %      MCV 98.6 fL      MCH 31.0 pg      MCHC 31.4 g/dL      RDW 18.5 %      RDW-SD 63.9 fl      MPV 8.1 fL      Platelets 120 10*3/mm3      Neutrophil % 88.7 %      Lymphocyte % 3.8 %      Monocyte % 7.1 %      Eosinophil % 0.1 %      Basophil % 0.3 %      Neutrophils, Absolute 11.90 10*3/mm3      Lymphocytes, Absolute 0.50 10*3/mm3      Monocytes, Absolute 1.00 10*3/mm3      Eosinophils,  Absolute 0.00 10*3/mm3      Basophils, Absolute 0.00 10*3/mm3      nRBC 0.6 /100 WBC     Blood Gas, Arterial [[id number]]  (Abnormal) Collected:  10/19/19 0345    Specimen:  Arterial Blood Updated:  10/19/19 0347     Site Left Radial     John's Test Positive     pH, Arterial 7.364 pH units      pCO2, Arterial 59.8 mm Hg      pO2, Arterial 99.5 mm Hg      HCO3, Arterial 34.0 mmol/L      Base Excess, Arterial 6.7 mmol/L      Comment: Serial Number: [id number]:  [id number]        O2 Saturation, Arterial 97.2 %      CO2 Content 35.9 mmol/L      Barometric Pressure for Blood Gas --     Comment: N/A        Modality Cannula     FIO2 <21 %      Hemodilution Yes    Lactic Acid, Plasma [[id number]]  (Normal) Collected:  10/19/19 0133    Specimen:  Blood Updated:  10/19/19 0200     Lactate 1.6 mmol/L     Lactic Acid, Plasma [[id number]]  (Normal) Collected:  10/18/19 2106    Specimen:  Blood Updated:  10/18/19 2146     Lactate 1.7 mmol/L     POC Glucose Once [[id number]]  (Abnormal) Collected:  10/18/19 2010    Specimen:  Blood Updated:  10/18/19 2012     Glucose 125 mg/dL      Comment: Serial Number: [id number]:  [id number]       Lactic Acid, Plasma [[id number]]  (Normal) Collected:  10/18/19 1719    Specimen:  Blood Updated:  10/18/19 1755     Lactate 1.7 mmol/L     POC Glucose Once [[id number]]  (Abnormal) Collected:  10/18/19 1645    Specimen:  Blood Updated:  10/18/19 1648     Glucose 125 mg/dL      Comment: Serial Number: [id number]:  [id number]       Procalcitonin [[id number]]  (Abnormal) Collected:  10/18/19 0357    Specimen:  Blood Updated:  10/18/19 1534     Procalcitonin 0.77 ng/mL     Narrative:       As a Marker for Sepsis (Non-Neonates):   1. <0.5 ng/mL represents a low risk of severe sepsis and/or septic shock.  1. >2 ng/mL represents a high risk of severe sepsis and/or septic shock.    As a Marker for Lower Respiratory Tract Infections that require antibiotic therapy:  PCT on Admission     Antibiotic  "Therapy             6-12 Hrs later  > 0.5                Strongly Recommended            >0.25 - <0.5         Recommended  0.1 - 0.25           Discouraged                   Remeasure/reassess PCT  <0.1                 Strongly Discouraged          Remeasure/reassess PCT      As 28 day mortality risk marker: \"Change in Procalcitonin Result\" (> 80 % or <=80 %) if Day 0 (or Day 1) and Day 4 values are available. Refer to [email or web address]/   Change in PCT <=80 %   A decrease of PCT levels below or equal to 80 % defines a positive change in PCT test result representing a higher risk for 28-day all-cause mortality of patients diagnosed with severe sepsis or septic shock.  Change in PCT > 80 %   A decrease of PCT levels of more than 80 % defines a negative change in PCT result representing a lower risk for 28-day all-cause mortality of patients diagnosed with severe sepsis or septic shock.                C-reactive Protein [[id number]]  (Abnormal) Collected:  10/18/19 0357    Specimen:  Blood Updated:  10/18/19 1529     C-Reactive Protein 4.27 mg/dL     Blood Gas, Arterial [[id number]]  (Abnormal) Collected:  10/18/19 1441    Specimen:  Arterial Blood Updated:  10/18/19 1444     Site Left Brachial     John's Test Positive     pH, Arterial 7.446 pH units      pCO2, Arterial 47.0 mm Hg      pO2, Arterial 101.7 mm Hg      HCO3, Arterial 32.3 mmol/L      Base Excess, Arterial 7.1 mmol/L      Comment: Serial Number: [id number]:  [id number]        O2 Saturation, Arterial 98.0 %      CO2 Content 33.8 mmol/L      Barometric Pressure for Blood Gas --     Comment: N/A        Modality BiPap     FIO2 50 %      Set Tidal Volume 500     PEEP 5     PSV 20 cmH2O      Hemodilution No     Respiratory Rate 18    Lactic Acid, Plasma [[id number]]  (Abnormal) Collected:  10/18/19 1254    Specimen:  Blood Updated:  10/18/19 1350     Lactate 2.1 mmol/L     POC Glucose Once [[id number]]  (Abnormal) Collected:  10/18/19 1209    " Specimen:  Blood Updated:  10/18/19 1216     Glucose 149 mg/dL      Comment: Serial Number: [id number]:  [id number]       Blood Culture - Blood, Arm, Right [[id number]]  (Abnormal) Collected:  10/14/19 1647    Specimen:  Blood from Arm, Right Updated:  10/18/19 0909     Blood Culture Corynebacterium species     Comment: Probable contaminant requires clinical correlation, susceptibility not performed unless requested by physician.            Isolated from Aerobic Bottle     Gram Stain Aerobic Bottle Gram positive bacilli resembling diphtheroids    Narrative:       Blood culture does not meet the specified criteria for PCR identification.  If pregnant, immunocompromised, or clinical concern for meningitis, call lab to run BCID for Listeria monocytogenes.    POC Glucose Once [[id number]]  (Abnormal) Collected:  10/18/19 0726    Specimen:  Blood Updated:  10/18/19 0729     Glucose 150 mg/dL      Comment: Serial Number: [id number]:  [id number]       Blood Gas, Arterial [[id number]]  (Abnormal) Collected:  10/18/19 0510    Specimen:  Arterial Blood Updated:  10/18/19 0534     Site Left Radial     John's Test Positive     pH, Arterial 7.196 pH units      pCO2, Arterial 66.1 mm Hg      pO2, Arterial 79.5 mm Hg      HCO3, Arterial 25.6 mmol/L      Base Excess, Arterial -3.8 mmol/L      Comment: Serial Number: [id number]:  [id number]        O2 Saturation, Arterial 91.9 %      CO2 Content 27.6 mmol/L      Barometric Pressure for Blood Gas --     Comment: N/A        Modality Vapotherm     FIO2 100 %      Hemodilution Yes    POC Lactate [[id number]]  (Abnormal) Collected:  10/18/19 0510    Specimen:  Blood Updated:  10/18/19 0533     Lactate 7.0 mmol/L      Comment: Serial Number: [id number]:  [id number]       Phosphorus [[id number]]  (Abnormal) Collected:  10/18/19 0356    Specimen:  Blood Updated:  10/18/19 0507     Phosphorus 5.4 mg/dL     Basic Metabolic Panel [[id number]]  (Abnormal) Collected:  10/18/19 0356     Specimen:  Blood Updated:  10/18/19 0506     Glucose 170 mg/dL      BUN 39 mg/dL      Creatinine 1.70 mg/dL      Sodium 137 mmol/L      Potassium 4.2 mmol/L      Chloride 99 mmol/L      CO2 21.0 mmol/L      Calcium 7.8 mg/dL      eGFR Non African Amer 28 mL/min/1.73      BUN/Creatinine Ratio 22.9     Anion Gap 17.0 mmol/L     Narrative:       GFR Normal >60  Chronic Kidney Disease <60  Kidney Failure <15    Magnesium [[id number]]  (Normal) Collected:  10/18/19 0356    Specimen:  Blood Updated:  10/18/19 0506     Magnesium 2.1 mg/dL     BNP [[id number]]  (Abnormal) Collected:  10/18/19 0357    Specimen:  Blood Updated:  10/18/19 0459     proBNP 26,409.0 pg/mL     Narrative:       Among patients with dyspnea, NT-proBNP is highly sensitive for the detection of acute congestive heart failure. In addition NT-proBNP of <300 pg/ml effectively rules out acute congestive heart failure with 99% negative predictive value.    Lactic Acid, Plasma [[id number]]  (Abnormal) Collected:  10/18/19 0356    Specimen:  Blood Updated:  10/18/19 0451     Lactate 5.9 mmol/L     CBC & Differential [[id number]] Collected:  10/18/19 0356    Specimen:  Blood Updated:  10/18/19 0420    Narrative:       The following orders were created for panel order CBC & Differential.  Procedure                               Abnormality         Status                     ---------                               -----------         ------                     CBC Auto Differential[[id number]]        Abnormal            Final result                 Please view results for these tests on the individual orders.    CBC Auto Differential [[id number]]  (Abnormal) Collected:  10/18/19 0356    Specimen:  Blood Updated:  10/18/19 0420     WBC 13.90 10*3/mm3      RBC 3.74 10*6/mm3      Hemoglobin 11.7 g/dL      Hematocrit 37.7 %      .0 fL      Comment: Result checked         MCH 31.4 pg      MCHC 31.1 g/dL      RDW 18.1 %      RDW-SD 64.3 fl      MPV 8.1 fL       Platelets 139 10*3/mm3      Neutrophil % 91.4 %      Lymphocyte % 2.1 %      Monocyte % 6.3 %      Eosinophil % 0.1 %      Basophil % 0.1 %      Neutrophils, Absolute 12.70 10*3/mm3      Lymphocytes, Absolute 0.30 10*3/mm3      Monocytes, Absolute 0.90 10*3/mm3      Eosinophils, Absolute 0.00 10*3/mm3      Basophils, Absolute 0.00 10*3/mm3      nRBC 0.8 /100 WBC     Protime-INR [[id number]]  (Abnormal) Collected:  10/18/19 0356    Specimen:  Blood Updated:  10/18/19 0415     Protime 15.6 Seconds      INR 1.60    POC Glucose Once [[id number]]  (Abnormal) Collected:  10/17/19 2040    Specimen:  Blood Updated:  10/17/19 2041     Glucose 149 mg/dL      Comment: Serial Number: [id number]:  [id number]           Ct Abdomen Pelvis Without Contrast    Result Date: 10/14/2019   1. Subtotal colectomy. 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 The subcutaneous portion of the ileal loop is slightly redundant however there is no small bowel distention to suggest obstruction. There are some scattered small bowel air-fluid levels in nondistended loops which could reflect an associated mild ileus or enteritis. 2. Moderate to large bilateral pleural effusions and dependent bibasilar atelectasis. In addition there is a mild amount of ascites deep in the pelvis and there is diffuse increased soft tissue stranding in the subcutaneous fat and mesenteric fat. The constellation of these findings does raise concern for changes of congestive heart failure. 3. Incidental note is made of a 3 cm right lateral hernia containing mesenteric fat.  Electronically Signed By-Ulysses Carpenter On:10/14/2019 7:16 PM This report was finalized on [id number] by  Ulysses Carpenter, .    Us Renal Limited    Result Date: 10/15/2019  Unremarkable retroperitoneal ultrasound examination. No evidence for hydronephrosis. Electronically signed by:  Jw Weiner M.D.  10/15/2019 3:23 AM    Xr Chest 1 View    Result Date: 10/20/2019  Limited study  demonstrating similar-appearing moderate bilateral pleural effusions with underlying bibasilar atelectasis and/or pneumonia. Interstitial edema may be resolved.  Electronically Signed By-Kwabena Meza On:10/20/2019 10:26 AM This report was finalized on [id number] by  Kwabena Meza, .    Xr Chest 1 View    Result Date: 10/19/2019  1.Moderate to large bilateral pleural effusions, similar to the most recent prior exam, but increased compared to 10/14/2019. There are diffuse interstitial opacities and findings suggest pulmonary edema/volume overload. 2.Bibasilar airspace opacities and consolidation may be related to atelectasis, edema, or infiltrate. 3.Cardiomegaly. 4.Right arm PICC terminates in the mid SVC.  Electronically Signed By-DR. Reilly Cody MD On:10/19/2019 8:37 AM This report was finalized on [id number] by DR. Reilly Cody MD.    Xr Chest 1 View    Result Date: 10/18/2019  1.  Markedly increased basilar consolidation and pleural fluid, likely edema. 2.  Right PICC line tip is in the SVC. Electronically signed by:  Jw Weiner M.D.  10/18/2019 3:25 AM    Xr Chest 1 View    Result Date: 10/14/2019  Under 1. PICC line in good position with the tip in the superior vena cava. 2. Findings suggesting changes of pulmonary edema and congestive failure which are worsened from the previous study.  Electronically Signed By-Ulysses Carpenter On:10/14/2019 7:03 PM This report was finalized on [id number] by  Ulysses Carpenter, .    Xr Chest 1 View    Result Date: 10/14/2019  Renomegaly. Basilar infiltrates and effusions. The appearance is somewhat more suggestive of changes of pulmonary edema and congestive failure. An underlying pneumonia cannot be excluded.  Electronically Signed By-Ulysses Carpenter On:10/14/2019 5:14 PM This report was finalized on [id number] by  Ulysses Carpenter, .    Xr Abdomen Kub    Result Date: 10/20/2019  Nonspecific, nonobstructive bowel gas pattern with a relative paucity of bowel  gas.  Electronically Signed By-Kwabena Meza On:10/20/2019 10:27 AM This report was finalized on [id number] by  [place]    Xr Abdomen Kub    Result Date: 10/15/2019  1.Tip of the enteric tube terminates in the left mid abdomen, likely in the gastric body. 2.Nonspecific upper abdominal bowel gas pattern. 3.Moderate bilateral pleural effusions with underlying atelectasis.  Electronically Signed By-Gloria Heath On:10/15/2019 8:37 AM This report was finalized on [id number] by  Gloria Heath, Flor      Consults:   Consults     Date and Time Order Name Status Description    10/17/2019 0944 Inpatient Hospitalist Consult Completed     10/15/2019 0511 Inpatient Nephrology Consult Completed     10/14/2019 1738 Cardiology (on-call MD unless specified) Completed           Condition on Discharge:     Vital Signs  Temp:  [96.4 °F (35.8 °C)-97.6 °F (36.4 °C)] 97.4 °F (36.3 °C)  Heart Rate:  [] 93  Resp:  [14-24] 24  BP: (81-96)/(59-72) 81/63    Discharge Disposition Morgue      Discharge Medications     Discharge Medications      ASK your doctor about these medications      Instructions Start Date   ARTIFICIAL TEARS 1.4 % ophthalmic solution  Generic drug:  polyvinyl alcohol   Every 12 Hours      BP MULTINATAL PLUS 30-1 MG tablet   Every 24 Hours      cholecalciferol 1000 units tablet  Commonly known as:  VITAMIN D3   Every 24 Hours      Cranberry 600 MG tablet   600 mg, Oral, 3 Times Daily      ELIQUIS 2.5 MG tablet tablet  Generic drug:  apixaban   2.5 mg, Oral, 2 Times Daily      FLORASTOR 250 MG capsule  Generic drug:  saccharomyces boulardii   FLORASTOR 250 MG CAPS      fluorometholone 0.1 % ophthalmic suspension  Commonly known as:  FML   1 Drop/kg, Both Eyes, Daily      levothyroxine 75 MCG tablet  Commonly known as:  SYNTHROID, LEVOTHROID   Every Morning Before Breakfast      loratadine 10 MG tablet  Commonly known as:  CLARITIN   10 mg, Oral, Daily      Magnesium 400 MG tablet   MAGNESIUM 400 MG  TABS      metoprolol tartrate 50 MG tablet  Commonly known as:  LOPRESSOR   50 mg, Oral, 2 Times Daily      mirtazapine 7.5 MG tablet  Commonly known as:  REMERON   7.5 mg, Daily      sertraline 25 MG tablet  Commonly known as:  ZOLOFT   25 mg, Oral, Daily             Follow-up Appointments  Future Appointments   Date Time Provider Department Center   1/8/2020  3:00 PM [facility] RAQUEL, NASIR PINZON [facility] NA   1/8/2020  3:20 PM Royal Palacios MD [facility] NA         Test Results Pending at Discharge   Order Current Status    Blood Culture - Blood, Blood, Arterial Line In process    Blood Culture - Blood, Blood, Central Line In process           Time:37 minutes

## 2019-10-20 NOTE — PLAN OF CARE
Problem: Fall Risk (Adult)  Goal: Absence of Fall  Outcome: Ongoing (interventions implemented as appropriate)      Problem: Patient Care Overview  Goal: Plan of Care Review  Outcome: Ongoing (interventions implemented as appropriate)   10/20/19 0630   Coping/Psychosocial   Plan of Care Reviewed With patient   Plan of Care Review   Progress declining   OTHER   Outcome Summary Afebrile this shift, however hypotension throughout night. Spoke with Leslie RUSHING twice and Jim RUSHING twice during night. Patients condition continued to decline. Lactic 8.3 this am.       10/20/19 0630   Coping/Psychosocial   Plan of Care Reviewed With patient   Plan of Care Review   Progress declining   OTHER   Outcome Summary Afebrile this shift, however hypotension throughout night. Spoke with Leslie RUSHING twice and Jim RUSHING twice during night. Patients condition continued to decline. Lactic 8.3 this am.

## 2019-10-20 NOTE — SIGNIFICANT NOTE
Patient continues to trigger sepsis screen. Skin cool and dry. Unable to take po fluids due to intermittent nausea. No emesis. Blood pressure 87/55 to 105/ 45. Monitor shows atrial fib with HR -105. T- 97.6 orally. Called nurse practitioner to inform of V/S and condition. Last lactic this am was 1.6. Spoke with Leslie RUSHING. Order received for Normal Saline at 100cc/hr.

## 2019-10-20 NOTE — PROGRESS NOTES
CARDIOLOGY FOLLOW-UP PROGRESS NOTE      Reason for follow-up:    A. fib with RVR  S/P permanent pacemaker  Sepsis  Acute on chronic renal failure     Attending: Andrea Alvarez,*      SUBJECTIVE:    Minimally communicative, mostly because of severe sensorineural hearing loss.  Does complain of nausea.  Denies chest pain or shortness of air.     Review of Systems   Unable to obtain meaningful ROS data in view of acuity of illness and severe hearing difficulties    Allergies: Cephalexin; Sulfadiazine; Trimethoprim; and Trospium    Scheduled Meds:  apixaban 2.5 mg Oral Q12H   dexamethasone 1 drop Both Eyes Q8H   insulin lispro 0-7 Units Subcutaneous 4x Daily With Meals & Nightly   levothyroxine 75 mcg Oral QAM AC   meropenem 500 mg Intravenous Q12H   metoprolol tartrate 50 mg Oral Q12H   mirtazapine 7.5 mg Oral Nightly   polyethyl glycol-propyl glycol 1 drop Both Eyes BID   potassium chloride 20 mEq Oral Daily   prenatal vitamin 27-0.8 1 tablet Oral Daily   sertraline 25 mg Oral Daily   sodium chloride 10 mL Intravenous Q12H       Continuous Infusions:  Pharmacy to Dose meropenem (MERREM)     sodium chloride 100 mL/hr Last Rate: 100 mL/hr (10/19/19 2130)       PRN Meds:.dextrose  •  dextrose  •  glucagon (human recombinant)  •  metoprolol tartrate  •  ondansetron  •  Pharmacy to Dose meropenem (MERREM)  •  sodium chloride  •  sodium chloride    Objective     Vital Signs  Vitals:    10/19/19 1031 10/19/19 1445 10/19/19 1743 10/19/19 2131   BP: 115/80 96/70 91/67 90/62   BP Location:       Patient Position:       Pulse: 100 93 100 101   Resp: 14 14 14    Temp: 97.5 °F (36.4 °C) 96.1 °F (35.6 °C) 96.7 °F (35.9 °C)    TempSrc: Oral Oral Oral    SpO2: 100% 98% 100%    Weight:       Height:         Wt Readings from Last 1 Encounters:   10/19/19 62.6 kg (138 lb 0.1 oz)       Physical Exam:     General Appearance:   Frail pale elderly, chronically ill-appearing female in mild abdominal distress with nausea   Neck:   Supple without JVD or bruit   Lungs:    Diminished airflow and crackles in both bases    Heart:   Irregularly irregular rhythm consistent with atrial fibrillation with controlled ventricular rate   Chest Wall/Thorax:    No abnormalities observed   Abdomen:     Normal bowel sounds, no masses, no organomegaly, soft        non-tender, non-distended, no guarding, no rebound                tenderness   Extremities:  Range of motion adequate, no edema, no cyanosis, no             redness   Pulses:   Pulses palpable and equal bilaterally   Skin:   No bleeding, bruising or rash   Neurologic:  No focal deficits               Results Review:     CBC    Results from last 7 days   Lab Units 10/19/19  0423 10/18/19  0356 10/17/19  0405 10/16/19  0400 10/15/19  0424 10/15/19  0034 10/14/19  1620   WBC 10*3/mm3 13.40* 13.90* 13.10* 20.50* 24.90* 21.70* 18.80*   HEMOGLOBIN g/dL 11.4* 11.7* 11.0* 11.0* 11.2* 11.0* 13.1   PLATELETS 10*3/mm3 120* 139* 116* 160 185 187 210     BMP   Results from last 7 days   Lab Units 10/19/19  0423 10/18/19  0356 10/17/19  1653 10/17/19  0405 10/16/19  0400 10/15/19  1121 10/15/19  0424  10/14/19  1619   SODIUM mmol/L 143 137 139 142 148* 146* 147*   < > 142   POTASSIUM mmol/L 4.1 4.2 4.1 3.8 3.6 4.2 4.6   < > 5.2*   CHLORIDE mmol/L 99 99 99 100 105 104 107   < > 103   CO2 mmol/L 32.0* 21.0* 28.0 31.0* 33.0* 23.0 20.0*   < > 12.0*   BUN mg/dL 44* 39* 40* 48* 63* 68* 69*   < > 71*   CREATININE mg/dL 1.68* 1.70* 1.70* 2.05* 2.65* 3.18* 3.50*   < > 4.00*   GLUCOSE mg/dL 124* 170* 169* 146* 162* 213* 161*   < > 152*   MAGNESIUM mg/dL 1.7 2.1  --  2.0 2.4*  --  2.8*  --  3.4*   PHOSPHORUS mg/dL 4.2 5.4*  --  2.8 3.1  --  4.3  --  6.5*    < > = values in this interval not displayed.     Radiology(recent) Xr Chest 1 View    Result Date: 10/19/2019  1.Moderate to large bilateral pleural effusions, similar to the most recent prior exam, but increased compared to 10/14/2019. There are diffuse interstitial  opacities and findings suggest pulmonary edema/volume overload. 2.Bibasilar airspace opacities and consolidation may be related to atelectasis, edema, or infiltrate. 3.Cardiomegaly. 4.Right arm PICC terminates in the mid SVC.  Electronically Signed By-DR. Reilly Cody MD On:10/19/2019 8:37 AM This report was finalized on [id number] by DR. Reilly Cody MD.    Xr Chest 1 View    Result Date: 10/18/2019  1.  Markedly increased basilar consolidation and pleural fluid, likely edema. 2.  Right PICC line tip is in the SVC. Electronically signed by:  Jw Weiner M.D.  10/18/2019 3:25 AM      Cardiac Studies:  Echo shows normal LVEF 60%    Medication Review:   Scheduled Meds:  apixaban 2.5 mg Oral Q12H   dexamethasone 1 drop Both Eyes Q8H   insulin lispro 0-7 Units Subcutaneous 4x Daily With Meals & Nightly   levothyroxine 75 mcg Oral QAM AC   meropenem 500 mg Intravenous Q12H   metoprolol tartrate 50 mg Oral Q12H   mirtazapine 7.5 mg Oral Nightly   polyethyl glycol-propyl glycol 1 drop Both Eyes BID   potassium chloride 20 mEq Oral Daily   prenatal vitamin 27-0.8 1 tablet Oral Daily   sertraline 25 mg Oral Daily   sodium chloride 10 mL Intravenous Q12H     Continuous Infusions:  Pharmacy to Dose meropenem (MERREM)     sodium chloride 100 mL/hr Last Rate: 100 mL/hr (10/19/19 2130)     PRN Meds:.dextrose  •  dextrose  •  glucagon (human recombinant)  •  metoprolol tartrate  •  ondansetron  •  Pharmacy to Dose meropenem (MERREM)  •  sodium chloride  •  sodium chloride    Assessment:  #1 89-year-old female with sepsis secondary to E. coli UTI  #2 acute hypoxic respiratory failure with bilateral pleural effusions clinical CHF  #3 A. fib with RVR  #4 modest troponin elevation; non-ST MI  #5 acute kidney insufficiency    Plan:  In view of advanced age and multiple comorbidities, will continue with conservative medical management at this time.  Continue Eliquis for anticoagulation.  We will follow hemodynamics  closely.  ?  CODE STATUS-available relatives    I discussed the patients findings and my recommendations with patient and coordination of care      D Santos Ramos MD  10/19/19  11:59 PM

## 2019-10-20 NOTE — SIGNIFICANT NOTE
Jim RUSHING on unit. Informed him of patients vital signs, and nursing assessment. Informed of IV Normal Saline infusing at 100cc/hr, and decreases UOP. No new orders.

## 2019-10-20 NOTE — SIGNIFICANT NOTE
Called [facility] and spoke with Jim RUSHING about patient condition. Order received to 1. Draw Lactic 2. Give 250cc 5% Albumin IV stat.

## 2019-10-20 NOTE — PROGRESS NOTES
"                                                                                                                                      Nephrology  Progress Note                                        [facility]    Patient Identification    Name: Dinora Vásquez  Age: 89 y.o.  Sex: female  :  1926  MRN: [id number]      Reji Casey MD/ covering for Dr Gallardo    DATE OF SERVICE:  10/20/2019        Subective    F/u LEYLA  Tr to ICU for resp distress     Objective   Scheduled Meds:    apixaban 2.5 mg Oral Q12H   dexamethasone 1 drop Both Eyes Q8H   insulin lispro 0-7 Units Subcutaneous 4x Daily With Meals & Nightly   levothyroxine 75 mcg Oral QAM AC   meropenem 1 g Intravenous Q12H   mirtazapine 7.5 mg Oral Nightly   polyethyl glycol-propyl glycol 1 drop Both Eyes BID   potassium chloride 20 mEq Oral Daily   prenatal vitamin 27-0.8 1 tablet Oral Daily   sertraline 25 mg Oral Daily   sodium chloride 1,000 mL Intravenous Once   sodium chloride 10 mL Intravenous Q12H   vancomycin 1,250 mg Intravenous Once         Continuous Infusions:    norepinephrine 0.02-0.3 mcg/kg/min Last Rate: 0.5 mcg/kg/min (10/20/19 0825)   Pharmacy to Dose meropenem (MERREM)     Pharmacy to dose vancomycin     sodium chloride 100 mL/hr Last Rate: 100 mL/hr (10/19/19 2130)   vasopressin 0.03 Units/min        PRN Meds:dextrose  •  dextrose  •  glucagon (human recombinant)  •  metoprolol tartrate  •  ondansetron  •  Pharmacy to Dose meropenem (MERREM)  •  Pharmacy to dose vancomycin  •  sodium chloride  •  sodium chloride  •  vancomycin  •  vasopressin     Exam:  BP (!) 81/63   Pulse 93   Temp 97.6 °F (36.4 °C)   Resp 24   Ht 160 cm (62.99\")   Wt 60.4 kg (133 lb 2.5 oz)   SpO2 (!) 67%   BMI 23.59 kg/m²     Intake/Output last 3 shifts:  I/O last 3 completed shifts:  In: 1300 [I.V.:1000; IV Piggyback:300]  Out: 700 [Urine:450; Stool:250]    Intake/Output this shift:  No intake/output data recorded.    Physical " exam:  Awake on NRM  PERTL  No JVD  Chest: decreased BS occasional ronchi  CVS Regular rate and rhythm, S1 and S2 normal  Abdomen:  Soft, non-tender, bowel sounds active   LE: trace edema  Skin:  No rashes or lesions       Data Review:  All labs (24hrs):   Recent Results (from the past 24 hour(s))   POC Glucose Once    Collection Time: 10/19/19 11:48 AM   Result Value Ref Range    Glucose 122 (H) 70 - 105 mg/dL   POC Glucose Once    Collection Time: 10/19/19  4:53 PM   Result Value Ref Range    Glucose 135 (H) 70 - 105 mg/dL   POC Glucose Once    Collection Time: 10/19/19  8:17 PM   Result Value Ref Range    Glucose 137 (H) 70 - 105 mg/dL   Basic Metabolic Panel    Collection Time: 10/20/19  5:43 AM   Result Value Ref Range    Glucose 90 65 - 99 mg/dL    BUN 58 (H) 8 - 23 mg/dL    Creatinine 2.31 (H) 0.57 - 1.00 mg/dL    Sodium 143 136 - 145 mmol/L    Potassium 5.1 3.5 - 5.2 mmol/L    Chloride 99 98 - 107 mmol/L    CO2 23.0 22.0 - 29.0 mmol/L    Calcium 7.9 (L) 8.2 - 9.6 mg/dL    eGFR Non African Amer 20 (L) >60 mL/min/1.73    BUN/Creatinine Ratio 25.1 (H) 7.0 - 25.0    Anion Gap 21.0 (H) 5.0 - 15.0 mmol/L   Magnesium    Collection Time: 10/20/19  5:43 AM   Result Value Ref Range    Magnesium 1.9 1.7 - 2.3 mg/dL   Phosphorus    Collection Time: 10/20/19  5:43 AM   Result Value Ref Range    Phosphorus 6.8 (H) 2.5 - 4.5 mg/dL   CBC Auto Differential    Collection Time: 10/20/19  5:43 AM   Result Value Ref Range    WBC 16.80 (H) 3.40 - 10.80 10*3/mm3    RBC 3.84 3.77 - 5.28 10*6/mm3    Hemoglobin 12.1 12.0 - 15.9 g/dL    Hematocrit 38.2 34.0 - 46.6 %    MCV 99.5 (H) 79.0 - 97.0 fL    MCH 31.5 26.6 - 33.0 pg    MCHC 31.7 31.5 - 35.7 g/dL    RDW 19.2 (H) 12.3 - 15.4 %    RDW-SD 67.4 (H) 37.0 - 54.0 fl    MPV 8.4 6.0 - 12.0 fL    Platelets 147 140 - 450 10*3/mm3    Neutrophil % 87.0 (H) 42.7 - 76.0 %    Lymphocyte % 2.3 (L) 19.6 - 45.3 %    Monocyte % 10.2 5.0 - 12.0 %    Eosinophil % 0.0 (L) 0.3 - 6.2 %    Basophil % 0.5  0.0 - 1.5 %    Neutrophils, Absolute 14.70 (H) 1.70 - 7.00 10*3/mm3    Lymphocytes, Absolute 0.40 (L) 0.70 - 3.10 10*3/mm3    Monocytes, Absolute 1.70 (H) 0.10 - 0.90 10*3/mm3    Eosinophils, Absolute 0.00 0.00 - 0.40 10*3/mm3    Basophils, Absolute 0.10 0.00 - 0.20 10*3/mm3    nRBC 0.3 (H) 0.0 - 0.2 /100 WBC   Lactic Acid, Plasma    Collection Time: 10/20/19  5:46 AM   Result Value Ref Range    Lactate 8.3 (C) 0.5 - 2.0 mmol/L   Procalcitonin    Collection Time: 10/20/19 10:22 AM   Result Value Ref Range    Procalcitonin 1.28 (H) 0.10 - 0.25 ng/mL   Lactic Acid, Plasma    Collection Time: 10/20/19 10:22 AM   Result Value Ref Range    Lactate 9.7 (C) 0.5 - 2.0 mmol/L          Imaging:  [unfilled]    Assessment/Plan:     Atrial fibrillation with RVR (CMS/HCC)    Acute on chronic renal failure (CMS/HCC)    Elevated brain natriuretic peptide (BNP) level    Elevated troponin     Acute kidney injury on  CKD stage III baseline cr 1.4--ATN     S/p pulm edema    Lactic acidosis  Hypocalcemia replace  Acute hypoxic respiratory failure  Septic shock  Urosepsis  Leukocytosis  Pleural effusions  Status post hyperkalemia  Atrial fibrillation with rapid ventricular response  History of hypertension  History of lower extremity DVT and PE  New onset congestive heart failure  Abdominal pain with possible ileus  Non-ST elevation MI     Non oliguric ATN  Stop potassium  Dose lasix  If no response, may need dialysis    Reji Casey MD/ for Dr Gallardo

## 2019-10-20 NOTE — SIGNIFICANT NOTE
Patient condition declining. [facility] and left message to call hospital.

## 2019-10-20 NOTE — PROGRESS NOTES
"Pharmacokinetic Consult - Vancomycin Dosing    Dinora Vásquez is a 89 y.o.female transfer from PCU 10/20 due to hypotension and elevated lactic acid. Pt with ESBL E. coli UTI this admission. Pharmacy to dose vancomycin for sepsis.      Assessment/Plan  1) Day #1 Vancomycin (Restart): Pt previously received 2 doses of vancomycin this admission. Pulse dosing in renal dysfunction. Pt to receive 1250mg (~20mg/kg ABW) IV x1 today. Random to be drawn w/ AM labs 10/21. Plan to redose when level expected to be <20mcg/mL.    2) Day #7 Meropenem: Increasing dose to 1000mg IV q12h, for critical care patient with known MDRO and CrCl 10-25mL/min. (Per d/w KPA, extend duration of meropenem as new cultures have been ordered)    Will continue to monitor renal function, drug levels, C&S, and patient clinical status.       Relevant clinical data and objective history reviewed:  160 cm (62.99\")   60.4 kg (133 lb 2.5 oz)   Ideal body weight: 52.4 kg (115 lb 7.7 oz)  Adjusted ideal body weight: 55.6 kg (122 lb 8.8 oz)    Creatinine   Date Value Ref Range Status   10/20/2019 2.31 (H) 0.57 - 1.00 mg/dL Final   10/19/2019 1.68 (H) 0.57 - 1.00 mg/dL Final   10/18/2019 1.70 (H) 0.57 - 1.00 mg/dL Final     Estimated Creatinine Clearance: 14.5 mL/min (A) (by C-G formula based on SCr of 2.31 mg/dL (H)).  I/O last 3 completed shifts:  In: 1300 [I.V.:1000; IV Piggyback:300]  Out: 700 [Urine:450; Stool:250]    Lab Results   Component Value Date    WBC 16.80 (H) 10/20/2019     Temp Readings from Last 3 Encounters:   10/20/19 97.6 °F (36.4 °C)     Baseline culture/source/susceptibility:  Microbiology Results (last 10 days)       Procedure Component Value - Date/Time    MRSA Screen Culture - Swab, Nares [[id number]]  (Normal) Collected:  10/14/19 2036    Lab Status:  Final result Specimen:  Swab from Nares Updated:  10/15/19 2036     MRSA SCREEN CX No Methicillin Resistant Staphylococcus aureus isolated    Respiratory Panel, PCR - Swab, " Nasopharynx [[id number]]  (Normal) Collected:  10/14/19 1951    Lab Status:  Final result Specimen:  Swab from Nasopharynx Updated:  10/14/19 2236     ADENOVIRUS, PCR Not Detected     Coronavirus 229E Not Detected     Coronavirus HKU1 Not Detected     Coronavirus NL63 Not Detected     Coronavirus OC43 Not Detected     Human Metapneumovirus Not Detected     Human Rhinovirus/Enterovirus Not Detected     Influenza B PCR Not Detected     Parainfluenza Virus 1 Not Detected     Parainfluenza Virus 2 Not Detected     Parainfluenza Virus 3 Not Detected     Parainfluenza Virus 4 Not Detected     Bordetella pertussis pcr Not Detected     Influenza A H1 2009 PCR Not Detected     Chlamydophila pneumoniae PCR Not Detected     Mycoplasma pneumo by PCR Not Detected     Influenza A PCR Not Detected     Influenza A H3 Not Detected     Influenza A H1 Not Detected     RSV, PCR Not Detected    S. Pneumo Ag Urine or CSF - Urine, Urine, Catheter [[id number]]  (Normal) Collected:  10/14/19 1929    Lab Status:  Final result Specimen:  Urine, Catheter Updated:  10/15/19 0831     Strep Pneumo Ag Negative    Legionella Antigen, Urine - Urine, Urine, Catheter [[id number]]  (Normal) Collected:  10/14/19 1929    Lab Status:  Final result Specimen:  Urine, Catheter Updated:  10/15/19 0830     LEGIONELLA ANTIGEN, URINE Negative    Urine Culture - Urine, Urine, Catheter [[id number]]  (Abnormal)  (Susceptibility) Collected:  10/14/19 1929    Lab Status:  Final result Specimen:  Urine, Catheter Updated:  10/16/19 1248     Urine Culture >100,000 CFU/mL Escherichia coli ESBL     Comment:   Consider infectious disease consult.  Susceptibility results may not correlate to clinical outcomes.       Susceptibility        Escherichia coli ESBL     WESLEY     Gentamicin Resistant     Levofloxacin Resistant     Meropenem Susceptible     Nitrofurantoin Susceptible     Piperacillin + Tazobactam Susceptible     Tetracycline Resistant     Trimethoprim + Sulfamethoxazole  Resistant                      Blood Culture - Blood, Blood, Venous Line [[id number]] Collected:  10/14/19 1716    Lab Status:  Final result Specimen:  Blood, Venous Line Updated:  10/19/19 1730     Blood Culture No growth at 5 days    Blood Culture - Blood, Arm, Right [[id number]]  (Abnormal) Collected:  10/14/19 1647    Lab Status:  Final result Specimen:  Blood from Arm, Right Updated:  10/18/19 0909     Blood Culture Corynebacterium species     Comment: Probable contaminant requires clinical correlation, susceptibility not performed unless requested by physician.            Isolated from Aerobic Bottle     Gram Stain Aerobic Bottle Gram positive bacilli resembling diphtheroids    Narrative:       Blood culture does not meet the specified criteria for PCR identification.  If pregnant, immunocompromised, or clinical concern for meningitis, call lab to run BCID for Listeria monocytogenes.             Anti-Infectives (From admission, onward)      Ordered     Dose/Rate Route Frequency Start Stop    10/20/19 1007  vancomycin 1250 mg/250 mL 0.9% NS IVPB ([facility])     Ordering Provider:  Laz Ruelas MD    1,250 mg  over 90 Minutes Intravenous Once 10/20/19 1100      10/20/19 0935  Pharmacy to dose vancomycin     Ordering Provider:  Laz Ruelas MD     Does not apply Continuous PRN 10/20/19 0935 10/25/19 0934    10/18/19 1323  meropenem (MERREM) 500 mg in sodium chloride 0.9 % 100 mL IVPB     Ordering Provider:  Andrea Alvarez MD    500 mg  33.3 mL/hr over 180 Minutes Intravenous Every 12 Hours 10/18/19 1500 10/22/19 1459    10/15/19 1332  vancomycin 750 mg in sodium chloride 0.9 % 100 mL IVPB     Ordering Provider:  Amanda Kaba MD    750 mg Intravenous Once 10/15/19 1430 10/15/19 1458    10/15/19 0022  vancomycin 1000 mg/250 mL 0.9% NS IVPB ([facility])     Ordering Provider:  Jim Renae APRN    20 mg/kg × 53.7 kg  over 60 Minutes Intravenous Once 10/15/19 0115 10/15/19 0130    10/14/19  2105  Pharmacy to Dose meropenem (MERREM)     Ordering Provider:  Jim Renae APRN     Does not apply Continuous PRN 10/14/19 2104 10/21/19 2103    10/14/19 1649  meropenem (MERREM) 1 g in sodium chloride 0.9 % 100 mL IVPB     Ordering Provider:  Jossue Donovan MD    1 g  200 mL/hr over 30 Minutes Intravenous Once 10/14/19 1651 10/14/19 1734           No results found for: SANTI  Lab Results   Component Value Date    [facility] 20.60 10/16/2019     No results found for: MIRZA Malin, PharmD

## 2019-10-20 NOTE — PROCEDURES
Arterial catheter Placement  Date: 10/20/2019  Time: 12 PM    Indication: Hemodynamic monitoring/Intravenous access    Attending: Dr. Ruelas  A time-out was completed verifying correct patient, procedure, site, positioning. The patient’s right femoral artery was identified via ultrasound and was prepped and draped in sterile fashion. 1% Lidocaine was used to anesthetize the surrounding skin area and subcutaneous tissue.  An 18-gauge needle was then introduced into the artery under ultrasound guidance at which time bright red, pulsatile blood was visualized.  A guidewire was then passed smoothly through the needle and the needle removed.  An 18-gauge arterial catheter was introduced into the the right femoral artery using the Seldinger technique. The catheter was threaded smoothly over the guide wire and then the wire was removed and visualized to be intact.  Immediate return of bright red, pulsatile blood was obtained from the catheter. The catheter was then sutured in place to the skin and a sterile dressing applied.     Estimated Blood Loss: 5 mL  The patient tolerated the procedure well and there were no complications.     Femoral site was chosen as the patient has dusky and cyanotic fingers    Attending physician statement:  Above note scribed by nurse practitioner for me and later reviewed for accuracy. I've examined the patient and reviewed all labs and images.   I have directly participated in the evaluation and management of this patient.  Laz Ruelas MD  [facility]  [facility]

## 2019-10-20 NOTE — NURSING NOTE
FAST TEAM called around 0740 due to low blood pressure (60s/40s) and tachypnea. Patient's condition declined overnight, she received Albumin and 600cc NS total (according to report) without improvement. Nightshift RN has called family members and left voicemails requesting a call back.  Dr Alvarez and Dasha Gautam at bedside. Patient transferred to ICU and report given to LELIA Steward at bedside.   Called family and left another message.

## 2019-10-21 NOTE — PAYOR COMM NOTE
"  [facility]  NPI # [id number]  TID # [id number]      RETURN CONTACT  KONG HILTON RN [facility]   STEPHANIE /    PH: [phone]  FX: [phone]  ===========================  [phone]      REFERENCE # [id number]      DC NOTICE -     ===========================  Please respond to above contact. Thank you.     ===========================    Dinora Hdez (Dcsd. Female)     Date of Birth Social Security Number Address Home Phone MRN    [id number]  [id number] [place] [address] [phone] [id number]    Druze Marital Status          None        Admission Date Admission Type Admitting Provider Attending Provider Department, Room/Bed    10/14/19 Emergency Amanda Kaba MD  [facility] INTENSIVE CARE UNIT, 2313/1    Discharge Date Discharge Disposition Discharge Destination        10/20/2019  Other             Attending Provider:  (none)   Allergies:  Cephalexin, Sulfadiazine, Trimethoprim, Trospium    Isolation:  Contact   Infection:  ESBL E coli (10/15/19)   Code Status:  Prior    Ht:  160 cm (62.99\")   Wt:  60.4 kg (133 lb 2.5 oz)    Admission Cmt:  None   Principal Problem:  None                Active Insurance as of 10/14/2019     Primary Coverage     Payor Plan Insurance Group Employer/Plan Group    ANTHEM MEDICARE REPLACEMENT ANTHEM MEDICARE ADVANTAGE [id number]     Payor Plan Address Payor Plan Phone Number Payor Plan Fax Number Effective Dates    [address] [phone]  2015 - None Entered    [facility] [id number]       Subscriber Name Subscriber Birth Date Member ID       DINORA HDEZ 1926 [id number]                 Emergency Contacts      (Rel.) Home Phone Work Phone Mobile Phone    PEYTON MALCOLM (Care Giver) [phone] -- [phone]               Discharge Summary      Day, BAYLEE Lou at 10/20/19 1620          Pulmonary/ Critical Care/ sleep medicine death " summary Note    Date of Discharge:  10/20/2019    Cause of Death:  Septic shock likely related to urosepsis with hx of ESBL E. Coli UTI  Acute hypoxic respiratory failure  Lifelong nonsmoker    Presenting Problem/History of Present Illness  Active Hospital Problems    Diagnosis  POA   • Atrial fibrillation with RVR (CMS/HCC) [I48.91]  Unknown   • Acute on chronic renal failure (CMS/HCC) [N17.9, N18.9]  Unknown   • Elevated brain natriuretic peptide (BNP) level [R79.89]  Unknown   • Elevated troponin [R79.89]  Unknown      Resolved Hospital Problems    Diagnosis Date Resolved POA   • Septic shock (CMS/HCC) [A41.9, R65.21] 10/17/2019 Yes     Hospital Course  Dinora Vásquez  was a 89 y.o. female who presented to the ER after being seen earlier by Nephrology and found to be hypotensive and tachycardic; in addition she complained of dyspnea, abdominal pain and not feeling well.  She had a bolus of IVFs started and transferred to the ER.  Workup in the ER revealed afib with RVR, UTI, septic shock, CHF, STEMI, acute hypoxic respiratory failure and LEYLA.  She had IVFs per sepsis protocol given and started on Meropenem.  She was given a bolus of amio and Cardizem without improvement in her heart rate and subsequently developed hypotension.  She had a PICC ordered by the ER and was started on Levophed.   The patient was difficult to get a history from as she was obtunded.  Her sats were in the 70's and she was started on Precision Flow with improvement.       10/15/19: reported short of breath with minimal activity.  Remain ed on Precision flow supplemental oxygen.  No chest pains.  No nausea or vomiting  10/16/19:  Improved  O2 on 8L high flow nasal cannula.  HR remained 120s.  No abdominal pain.  No CP  10/17/19:  No issues overnight.  Off vasopressor . Pt was able to be transferred out of the ICU  10/18: reported feeling somewhat better.  Tolerat ed O2 7L NC.  Continued on amiodarone gtt, denied chest pain.  Required  "continuous AVAP over night for hypercapnia, improved by morning. Denied SOA at rest   10/20: Patient was a fast call this morning for hypotension and tachypnea.  Transferred to the ICU for vasopressor support and further evaluation.   Initially required continuous AVAP and rested comfortably.   Blood pressure support with Levophed and vasopressin. As the afternoon progressed the patient was able to rest off of AVAP and was comfortable with precision flow.  She became increasingly restless and uncomfortable although denied SOA.  Family was at bedside and updated routinely.  Pt's speech became increasingly difficult.  She experience a sudden drop in blood pressure, decreased level of consciousness and agonal respirations.  The family was updated a bedside and were with the pt when she  shortly thereafter     Procedures Performed    10/20 1257 Note By: Laz Ruelas MD    Labs/radiological studies:  Lab Results (last 72 hours)     Procedure Component Value Units Date/Time    Procalcitonin [[id number]]  (Abnormal) Collected:  10/20/19 1549    Specimen:  Blood Updated:  10/20/19 1625     Procalcitonin 1.30 ng/mL     Narrative:       As a Marker for Sepsis (Non-Neonates):   1. <0.5 ng/mL represents a low risk of severe sepsis and/or septic shock.  1. >2 ng/mL represents a high risk of severe sepsis and/or septic shock.    As a Marker for Lower Respiratory Tract Infections that require antibiotic therapy:  PCT on Admission     Antibiotic Therapy             6-12 Hrs later  > 0.5                Strongly Recommended            >0.25 - <0.5         Recommended  0.1 - 0.25           Discouraged                   Remeasure/reassess PCT  <0.1                 Strongly Discouraged          Remeasure/reassess PCT      As 28 day mortality risk marker: \"Change in Procalcitonin Result\" (> 80 % or <=80 %) if Day 0 (or Day 1) and Day 4 values are available. Refer to http://www.bras-pct-calculator.com/   Change in PCT " <=80 %   A decrease of PCT levels below or equal to 80 % defines a positive change in PCT test result representing a higher risk for 28-day all-cause mortality of patients diagnosed with severe sepsis or septic shock.  Change in PCT > 80 %   A decrease of PCT levels of more than 80 % defines a negative change in PCT result representing a lower risk for 28-day all-cause mortality of patients diagnosed with severe sepsis or septic shock.        Lactic Acid, Plasma [[id number]]  (Abnormal) Collected:  10/20/19 1549    Specimen:  Blood Updated:  10/20/19 1612     Lactate 10.8 mmol/L     Blood Culture - Blood, Blood, Arterial Line [[id number]] Collected:  10/20/19 1549    Specimen:  Blood, Arterial Line Updated:  10/20/19 1550    Blood Gas, Arterial [[id number]]  (Abnormal) Collected:  10/20/19 1410    Specimen:  Arterial Blood Updated:  10/20/19 1414     Site Arterial Line     John's Test N/A     pH, Arterial 7.284 pH units      pCO2, Arterial 37.1 mm Hg      pO2, Arterial 342.6 mm Hg      HCO3, Arterial 17.6 mmol/L      Base Excess, Arterial -8.4 mmol/L      Comment: Serial Number: [id number]:  [id number]        O2 Saturation, Arterial 99.9 %      CO2 Content 18.8 mmol/L      Barometric Pressure for Blood Gas --     Comment: N/A        Modality BiPap     FIO2 100 %      Ventilator Mode ;VC     Set Tidal Volume 500     PEEP 5     Hemodilution No     Respiratory Rate 18    POC Glucose Once [[id number]]  (Abnormal) Collected:  10/20/19 1351    Specimen:  Blood Updated:  10/20/19 1352     Glucose 163 mg/dL      Comment: Serial Number: [id number]:  [id number]       POC Glucose Once [[id number]]  (Abnormal) Collected:  10/20/19 1220    Specimen:  Blood Updated:  10/20/19 1221     Glucose 56 mg/dL      Comment: Serial Number: [id number]:  [id number]       POC Glucose Once [[id number]]  (Abnormal) Collected:  10/20/19 1216    Specimen:  Blood Updated:  10/20/19 1219     Glucose 30 mg/dL      Comment: Serial Number:  [id number]:  [id number]       Troponin [[id number]]  (Abnormal) Collected:  10/20/19 0543    Specimen:  Blood Updated:  10/20/19 1142     Troponin T 0.114 ng/mL     Narrative:       Troponin T Reference Range:  <= 0.03 ng/mL-   Negative for AMI  >0.03 ng/mL-     Abnormal for myocardial necrosis.  Clinicians would have to utilize clinical acumen, EKG, Troponin and serial changes to determine if it is an Acute Myocardial Infarction or myocardial injury due to an underlying chronic condition.     Troponin [[id number]]  (Abnormal) Collected:  10/20/19 1022    Specimen:  Blood Updated:  10/20/19 1139     Troponin T 0.132 ng/mL     Narrative:       Troponin T Reference Range:  <= 0.03 ng/mL-   Negative for AMI  >0.03 ng/mL-     Abnormal for myocardial necrosis.  Clinicians would have to utilize clinical acumen, EKG, Troponin and serial changes to determine if it is an Acute Myocardial Infarction or myocardial injury due to an underlying chronic condition.     Procalcitonin [[id number]]  (Abnormal) Collected:  10/20/19 1022    Specimen:  Blood Updated:  10/20/19 1114     Procalcitonin 1.28 ng/mL     Narrative:       As a Marker for Sepsis (Non-Neonates):   1. <0.5 ng/mL represents a low risk of severe sepsis and/or septic shock.  1. >2 ng/mL represents a high risk of severe sepsis and/or septic shock.    As a Marker for Lower Respiratory Tract Infections that require antibiotic therapy:  PCT on Admission     Antibiotic Therapy             6-12 Hrs later  > 0.5                Strongly Recommended            >0.25 - <0.5         Recommended  0.1 - 0.25           Discouraged                   Remeasure/reassess PCT  <0.1                 Strongly Discouraged          Remeasure/reassess PCT      As 28 day mortality risk marker: \"Change in Procalcitonin Result\" (> 80 % or <=80 %) if Day 0 (or Day 1) and Day 4 values are available. Refer to http://www.[facility]-pct-calculator.com/   Change in PCT <=80 %   A decrease of PCT levels " below or equal to 80 % defines a positive change in PCT test result representing a higher risk for 28-day all-cause mortality of patients diagnosed with severe sepsis or septic shock.  Change in PCT > 80 %   A decrease of PCT levels of more than 80 % defines a negative change in PCT result representing a lower risk for 28-day all-cause mortality of patients diagnosed with severe sepsis or septic shock.                Lactic Acid, Plasma [[id number]]  (Abnormal) Collected:  10/20/19 1022    Specimen:  Blood Updated:  10/20/19 1056     Lactate 9.7 mmol/L     Blood Culture - Blood, Blood, Central Line [[id number]] Collected:  10/20/19 1022    Specimen:  Blood, Central Line Updated:  10/20/19 1029    Lactic Acid, Plasma [[id number]]  (Abnormal) Collected:  10/20/19 0546    Specimen:  Blood Updated:  10/20/19 0722     Lactate 8.3 mmol/L     Phosphorus [[id number]]  (Abnormal) Collected:  10/20/19 0543    Specimen:  Blood Updated:  10/20/19 0621     Phosphorus 6.8 mg/dL     Basic Metabolic Panel [[id number]]  (Abnormal) Collected:  10/20/19 0543    Specimen:  Blood Updated:  10/20/19 0621     Glucose 90 mg/dL      BUN 58 mg/dL      Creatinine 2.31 mg/dL      Sodium 143 mmol/L      Potassium 5.1 mmol/L      Chloride 99 mmol/L      CO2 23.0 mmol/L      Calcium 7.9 mg/dL      eGFR Non African Amer 20 mL/min/1.73      BUN/Creatinine Ratio 25.1     Anion Gap 21.0 mmol/L     Narrative:       GFR Normal >60  Chronic Kidney Disease <60  Kidney Failure <15    Magnesium [[id number]]  (Normal) Collected:  10/20/19 0543    Specimen:  Blood Updated:  10/20/19 0620     Magnesium 1.9 mg/dL     CBC & Differential [[id number]] Collected:  10/20/19 0543    Specimen:  Blood Updated:  10/20/19 0617    Narrative:       The following orders were created for panel order CBC & Differential.  Procedure                               Abnormality         Status                     ---------                               -----------         ------                      CBC Auto Differential[[id number]]        Abnormal            Final result                 Please view results for these tests on the individual orders.    CBC Auto Differential [[id number]]  (Abnormal) Collected:  10/20/19 0543    Specimen:  Blood Updated:  10/20/19 0617     WBC 16.80 10*3/mm3      RBC 3.84 10*6/mm3      Hemoglobin 12.1 g/dL      Hematocrit 38.2 %      MCV 99.5 fL      MCH 31.5 pg      MCHC 31.7 g/dL      RDW 19.2 %      RDW-SD 67.4 fl      MPV 8.4 fL      Platelets 147 10*3/mm3      Neutrophil % 87.0 %      Lymphocyte % 2.3 %      Monocyte % 10.2 %      Eosinophil % 0.0 %      Basophil % 0.5 %      Neutrophils, Absolute 14.70 10*3/mm3      Lymphocytes, Absolute 0.40 10*3/mm3      Monocytes, Absolute 1.70 10*3/mm3      Eosinophils, Absolute 0.00 10*3/mm3      Basophils, Absolute 0.10 10*3/mm3      nRBC 0.3 /100 WBC     POC Glucose Once [[id number]]  (Abnormal) Collected:  10/19/19 2017    Specimen:  Blood Updated:  10/19/19 2021     Glucose 137 mg/dL      Comment: Serial Number: [id number]:  [id number]       Blood Culture - Blood, Blood, Venous Line [[id number]] Collected:  10/14/19 1716    Specimen:  Blood, Venous Line Updated:  10/19/19 1730     Blood Culture No growth at 5 days    POC Glucose Once [[id number]]  (Abnormal) Collected:  10/19/19 1653    Specimen:  Blood Updated:  10/19/19 1654     Glucose 135 mg/dL      Comment: Serial Number: [id number]:  [id number]       POC Glucose Once [[id number]]  (Abnormal) Collected:  10/19/19 1148    Specimen:  Blood Updated:  10/19/19 1151     Glucose 122 mg/dL      Comment: Serial Number: [id number]:  [id number]       POC Glucose Once [[id number]]  (Normal) Collected:  10/19/19 0737    Specimen:  Blood Updated:  10/19/19 0740     Glucose 102 mg/dL      Comment: Serial Number: [id number]:  [id number]       Basic Metabolic Panel [[id number]]  (Abnormal) Collected:  10/19/19 0423    Specimen:  Blood Updated:  10/19/19 0454     Glucose  124 mg/dL      BUN 44 mg/dL      Creatinine 1.68 mg/dL      Sodium 143 mmol/L      Potassium 4.1 mmol/L      Chloride 99 mmol/L      CO2 32.0 mmol/L      Calcium 7.7 mg/dL      eGFR Non African Amer 28 mL/min/1.73      BUN/Creatinine Ratio 26.2     Anion Gap 12.0 mmol/L     Narrative:       GFR Normal >60  Chronic Kidney Disease <60  Kidney Failure <15    Magnesium [[id number]]  (Normal) Collected:  10/19/19 0423    Specimen:  Blood Updated:  10/19/19 0449     Magnesium 1.7 mg/dL     Phosphorus [[id number]]  (Normal) Collected:  10/19/19 0423    Specimen:  Blood Updated:  10/19/19 0449     Phosphorus 4.2 mg/dL     Protime-INR [[id number]]  (Abnormal) Collected:  10/19/19 0423    Specimen:  Blood Updated:  10/19/19 0432     Protime 17.8 Seconds      INR 1.84    CBC & Differential [[id number]] Collected:  10/19/19 0423    Specimen:  Blood Updated:  10/19/19 0426    Narrative:       The following orders were created for panel order CBC & Differential.  Procedure                               Abnormality         Status                     ---------                               -----------         ------                     CBC Auto Differential[[id number]]        Abnormal            Final result                 Please view results for these tests on the individual orders.    CBC Auto Differential [[id number]]  (Abnormal) Collected:  10/19/19 0423    Specimen:  Blood Updated:  10/19/19 0426     WBC 13.40 10*3/mm3      RBC 3.69 10*6/mm3      Hemoglobin 11.4 g/dL      Hematocrit 36.4 %      MCV 98.6 fL      MCH 31.0 pg      MCHC 31.4 g/dL      RDW 18.5 %      RDW-SD 63.9 fl      MPV 8.1 fL      Platelets 120 10*3/mm3      Neutrophil % 88.7 %      Lymphocyte % 3.8 %      Monocyte % 7.1 %      Eosinophil % 0.1 %      Basophil % 0.3 %      Neutrophils, Absolute 11.90 10*3/mm3      Lymphocytes, Absolute 0.50 10*3/mm3      Monocytes, Absolute 1.00 10*3/mm3      Eosinophils, Absolute 0.00 10*3/mm3      Basophils, Absolute 0.00  10*3/mm3      nRBC 0.6 /100 WBC     Blood Gas, Arterial [[id number]]  (Abnormal) Collected:  10/19/19 0345    Specimen:  Arterial Blood Updated:  10/19/19 0347     Site Left Radial     John's Test Positive     pH, Arterial 7.364 pH units      pCO2, Arterial 59.8 mm Hg      pO2, Arterial 99.5 mm Hg      HCO3, Arterial 34.0 mmol/L      Base Excess, Arterial 6.7 mmol/L      Comment: Serial Number: [id number]:  [id number]        O2 Saturation, Arterial 97.2 %      CO2 Content 35.9 mmol/L      Barometric Pressure for Blood Gas --     Comment: N/A        Modality Cannula     FIO2 <21 %      Hemodilution Yes    Lactic Acid, Plasma [[id number]]  (Normal) Collected:  10/19/19 0133    Specimen:  Blood Updated:  10/19/19 0200     Lactate 1.6 mmol/L     Lactic Acid, Plasma [[id number]]  (Normal) Collected:  10/18/19 2106    Specimen:  Blood Updated:  10/18/19 2146     Lactate 1.7 mmol/L     POC Glucose Once [[id number]]  (Abnormal) Collected:  10/18/19 2010    Specimen:  Blood Updated:  10/18/19 2012     Glucose 125 mg/dL      Comment: Serial Number: [id number]:  [id number]       Lactic Acid, Plasma [[id number]]  (Normal) Collected:  10/18/19 1719    Specimen:  Blood Updated:  10/18/19 1755     Lactate 1.7 mmol/L     POC Glucose Once [[id number]]  (Abnormal) Collected:  10/18/19 1645    Specimen:  Blood Updated:  10/18/19 1648     Glucose 125 mg/dL      Comment: Serial Number: [id number]:  [id number]       Procalcitonin [[id number]]  (Abnormal) Collected:  10/18/19 0357    Specimen:  Blood Updated:  10/18/19 1534     Procalcitonin 0.77 ng/mL     Narrative:       As a Marker for Sepsis (Non-Neonates):   1. <0.5 ng/mL represents a low risk of severe sepsis and/or septic shock.  1. >2 ng/mL represents a high risk of severe sepsis and/or septic shock.    As a Marker for Lower Respiratory Tract Infections that require antibiotic therapy:  PCT on Admission     Antibiotic Therapy             6-12 Hrs later  > 0.5                 "Strongly Recommended            >0.25 - <0.5         Recommended  0.1 - 0.25           Discouraged                   Remeasure/reassess PCT  <0.1                 Strongly Discouraged          Remeasure/reassess PCT      As 28 day mortality risk marker: \"Change in Procalcitonin Result\" (> 80 % or <=80 %) if Day 0 (or Day 1) and Day 4 values are available. Refer to http://www.[employer] AssociationWirecom Technologiespct-calculator.com/   Change in PCT <=80 %   A decrease of PCT levels below or equal to 80 % defines a positive change in PCT test result representing a higher risk for 28-day all-cause mortality of patients diagnosed with severe sepsis or septic shock.  Change in PCT > 80 %   A decrease of PCT levels of more than 80 % defines a negative change in PCT result representing a lower risk for 28-day all-cause mortality of patients diagnosed with severe sepsis or septic shock.                C-reactive Protein [[id number]]  (Abnormal) Collected:  10/18/19 0357    Specimen:  Blood Updated:  10/18/19 1529     C-Reactive Protein 4.27 mg/dL     Blood Gas, Arterial [[id number]]  (Abnormal) Collected:  10/18/19 1441    Specimen:  Arterial Blood Updated:  10/18/19 1444     Site Left Brachial     John's Test Positive     pH, Arterial 7.446 pH units      pCO2, Arterial 47.0 mm Hg      pO2, Arterial 101.7 mm Hg      HCO3, Arterial 32.3 mmol/L      Base Excess, Arterial 7.1 mmol/L      Comment: Serial Number: [id number]:  [id number]        O2 Saturation, Arterial 98.0 %      CO2 Content 33.8 mmol/L      Barometric Pressure for Blood Gas --     Comment: N/A        Modality BiPap     FIO2 50 %      Set Tidal Volume 500     PEEP 5     PSV 20 cmH2O      Hemodilution No     Respiratory Rate 18    Lactic Acid, Plasma [[id number]]  (Abnormal) Collected:  10/18/19 1254    Specimen:  Blood Updated:  10/18/19 1350     Lactate 2.1 mmol/L     POC Glucose Once [[id number]]  (Abnormal) Collected:  10/18/19 1209    Specimen:  Blood Updated:  10/18/19 1216     Glucose 149 " mg/dL      Comment: Serial Number: [id number]:  [id number]       Blood Culture - Blood, Arm, Right [[id number]]  (Abnormal) Collected:  10/14/19 1647    Specimen:  Blood from Arm, Right Updated:  10/18/19 0909     Blood Culture Corynebacterium species     Comment: Probable contaminant requires clinical correlation, susceptibility not performed unless requested by physician.            Isolated from Aerobic Bottle     Gram Stain Aerobic Bottle Gram positive bacilli resembling diphtheroids    Narrative:       Blood culture does not meet the specified criteria for PCR identification.  If pregnant, immunocompromised, or clinical concern for meningitis, call lab to run BCID for Listeria monocytogenes.    POC Glucose Once [[id number]]  (Abnormal) Collected:  10/18/19 0726    Specimen:  Blood Updated:  10/18/19 0729     Glucose 150 mg/dL      Comment: Serial Number: [id number]:  [id number]       Blood Gas, Arterial [[id number]]  (Abnormal) Collected:  10/18/19 0510    Specimen:  Arterial Blood Updated:  10/18/19 0534     Site Left Radial     John's Test Positive     pH, Arterial 7.196 pH units      pCO2, Arterial 66.1 mm Hg      pO2, Arterial 79.5 mm Hg      HCO3, Arterial 25.6 mmol/L      Base Excess, Arterial -3.8 mmol/L      Comment: Serial Number: [id number]:  [id number]        O2 Saturation, Arterial 91.9 %      CO2 Content 27.6 mmol/L      Barometric Pressure for Blood Gas --     Comment: N/A        Modality Vapotherm     FIO2 100 %      Hemodilution Yes    POC Lactate [[id number]]  (Abnormal) Collected:  10/18/19 0510    Specimen:  Blood Updated:  10/18/19 0533     Lactate 7.0 mmol/L      Comment: Serial Number: [id number]:  [id number]       Phosphorus [[id number]]  (Abnormal) Collected:  10/18/19 0356    Specimen:  Blood Updated:  10/18/19 0507     Phosphorus 5.4 mg/dL     Basic Metabolic Panel [[id number]]  (Abnormal) Collected:  10/18/19 0356    Specimen:  Blood Updated:  10/18/19 0506     Glucose 170 mg/dL       BUN 39 mg/dL      Creatinine 1.70 mg/dL      Sodium 137 mmol/L      Potassium 4.2 mmol/L      Chloride 99 mmol/L      CO2 21.0 mmol/L      Calcium 7.8 mg/dL      eGFR Non African Amer 28 mL/min/1.73      BUN/Creatinine Ratio 22.9     Anion Gap 17.0 mmol/L     Narrative:       GFR Normal >60  Chronic Kidney Disease <60  Kidney Failure <15    Magnesium [[id number]]  (Normal) Collected:  10/18/19 0356    Specimen:  Blood Updated:  10/18/19 0506     Magnesium 2.1 mg/dL     BNP [[id number]]  (Abnormal) Collected:  10/18/19 0357    Specimen:  Blood Updated:  10/18/19 0459     proBNP 26,409.0 pg/mL     Narrative:       Among patients with dyspnea, NT-proBNP is highly sensitive for the detection of acute congestive heart failure. In addition NT-proBNP of <300 pg/ml effectively rules out acute congestive heart failure with 99% negative predictive value.    Lactic Acid, Plasma [[id number]]  (Abnormal) Collected:  10/18/19 0356    Specimen:  Blood Updated:  10/18/19 0451     Lactate 5.9 mmol/L     CBC & Differential [[id number]] Collected:  10/18/19 0356    Specimen:  Blood Updated:  10/18/19 0420    Narrative:       The following orders were created for panel order CBC & Differential.  Procedure                               Abnormality         Status                     ---------                               -----------         ------                     CBC Auto Differential[[id number]]        Abnormal            Final result                 Please view results for these tests on the individual orders.    CBC Auto Differential [[id number]]  (Abnormal) Collected:  10/18/19 0356    Specimen:  Blood Updated:  10/18/19 0420     WBC 13.90 10*3/mm3      RBC 3.74 10*6/mm3      Hemoglobin 11.7 g/dL      Hematocrit 37.7 %      .0 fL      Comment: Result checked         MCH 31.4 pg      MCHC 31.1 g/dL      RDW 18.1 %      RDW-SD 64.3 fl      MPV 8.1 fL      Platelets 139 10*3/mm3      Neutrophil % 91.4 %      Lymphocyte % 2.1 %       Monocyte % 6.3 %      Eosinophil % 0.1 %      Basophil % 0.1 %      Neutrophils, Absolute 12.70 10*3/mm3      Lymphocytes, Absolute 0.30 10*3/mm3      Monocytes, Absolute 0.90 10*3/mm3      Eosinophils, Absolute 0.00 10*3/mm3      Basophils, Absolute 0.00 10*3/mm3      nRBC 0.8 /100 WBC     Protime-INR [[id number]]  (Abnormal) Collected:  10/18/19 0356    Specimen:  Blood Updated:  10/18/19 0415     Protime 15.6 Seconds      INR 1.60    POC Glucose Once [[id number]]  (Abnormal) Collected:  10/17/19 2040    Specimen:  Blood Updated:  10/17/19 2041     Glucose 149 mg/dL      Comment: Serial Number: [id number]:  [id number]           Ct Abdomen Pelvis Without Contrast    Result Date: 10/14/2019   1. Subtotal colectomy. 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 The subcutaneous portion of the ileal loop is slightly redundant however there is no small bowel distention to suggest obstruction. There are some scattered small bowel air-fluid levels in nondistended loops which could reflect an associated mild ileus or enteritis. 2. Moderate to large bilateral pleural effusions and dependent bibasilar atelectasis. In addition there is a mild amount of ascites deep in the pelvis and there is diffuse increased soft tissue stranding in the subcutaneous fat and mesenteric fat. The constellation of these findings does raise concern for changes of congestive heart failure. 3. Incidental note is made of a 3 cm right lateral hernia containing mesenteric fat.  Electronically Signed By-Ulysses Carpenter On:10/14/2019 7:16 PM This report was finalized on [id number] by  Ulysses Carpenter, .    Us Renal Limited    Result Date: 10/15/2019  Unremarkable retroperitoneal ultrasound examination. No evidence for hydronephrosis. Electronically signed by:  Jw Weiner M.D.  10/15/2019 3:23 AM    Xr Chest 1 View    Result Date: 10/20/2019  Limited study demonstrating similar-appearing moderate bilateral pleural effusions with  underlying bibasilar atelectasis and/or pneumonia. Interstitial edema may be resolved.  Electronically Signed By-Kwabena Meza On:10/20/2019 10:26 AM This report was finalized on [id number] by  Kwabena Meza, .    Xr Chest 1 View    Result Date: 10/19/2019  1.Moderate to large bilateral pleural effusions, similar to the most recent prior exam, but increased compared to 10/14/2019. There are diffuse interstitial opacities and findings suggest pulmonary edema/volume overload. 2.Bibasilar airspace opacities and consolidation may be related to atelectasis, edema, or infiltrate. 3.Cardiomegaly. 4.Right arm PICC terminates in the mid SVC.  Electronically Signed By-DR. Reilly Cody MD On:10/19/2019 8:37 AM This report was finalized on [id number] by DR. Reilly Cody MD.    Xr Chest 1 View    Result Date: 10/18/2019  1.  Markedly increased basilar consolidation and pleural fluid, likely edema. 2.  Right PICC line tip is in the SVC. Electronically signed by:  Jw Weiner M.D.  10/18/2019 3:25 AM    Xr Chest 1 View    Result Date: 10/14/2019  Under 1. PICC line in good position with the tip in the superior vena cava. 2. Findings suggesting changes of pulmonary edema and congestive failure which are worsened from the previous study.  Electronically Signed By-Ulysses Carpenter On:10/14/2019 7:03 PM This report was finalized on [id number] by  Ulysses Carpenter, .    Xr Chest 1 View    Result Date: 10/14/2019  Renomegaly. Basilar infiltrates and effusions. The appearance is somewhat more suggestive of changes of pulmonary edema and congestive failure. An underlying pneumonia cannot be excluded.  Electronically Signed By-Ulysses Carpenter On:10/14/2019 5:14 PM This report was finalized on [id number] by  Ulysses Carpenter, .    Xr Abdomen Kub    Result Date: 10/20/2019  Nonspecific, nonobstructive bowel gas pattern with a relative paucity of bowel gas.  Electronically Signed By-Kwabena Meza On:10/20/2019 10:27 AM This  report was finalized on [id number] by  Kwabena Meza .    Xr Abdomen Kub    Result Date: 10/15/2019  1.Tip of the enteric tube terminates in the left mid abdomen, likely in the gastric body. 2.Nonspecific upper abdominal bowel gas pattern. 3.Moderate bilateral pleural effusions with underlying atelectasis.  Electronically Signed By-Gloria Heath On:10/15/2019 8:37 AM This report was finalized on [id number] by  Gloria Heath, .      Consults:   Consults     Date and Time Order Name Status Description    10/17/2019 0944 Inpatient Hospitalist Consult Completed     10/15/2019 0511 Inpatient Nephrology Consult Completed     10/14/2019 1888 Cardiology (on-call MD unless specified) Completed           Condition on Discharge:     Vital Signs  Temp:  [96.4 °F (35.8 °C)-97.6 °F (36.4 °C)] 97.4 °F (36.3 °C)  Heart Rate:  [] 93  Resp:  [14-24] 24  BP: (81-96)/(59-72) 81/63    Discharge Disposition Morgue      Discharge Medications     Discharge Medications      ASK your doctor about these medications      Instructions Start Date   ARTIFICIAL TEARS 1.4 % ophthalmic solution  Generic drug:  polyvinyl alcohol   Every 12 Hours      BP MULTINATAL PLUS 30-1 MG tablet   Every 24 Hours      cholecalciferol 1000 units tablet  Commonly known as:  VITAMIN D3   Every 24 Hours      Cranberry 600 MG tablet   600 mg, Oral, 3 Times Daily      ELIQUIS 2.5 MG tablet tablet  Generic drug:  apixaban   2.5 mg, Oral, 2 Times Daily      FLORASTOR 250 MG capsule  Generic drug:  saccharomyces boulardii   FLORASTOR 250 MG CAPS      fluorometholone 0.1 % ophthalmic suspension  Commonly known as:  FML   1 Drop/kg, Both Eyes, Daily      levothyroxine 75 MCG tablet  Commonly known as:  SYNTHROID, LEVOTHROID   Every Morning Before Breakfast      loratadine 10 MG tablet  Commonly known as:  CLARITIN   10 mg, Oral, Daily      Magnesium 400 MG tablet   MAGNESIUM 400 MG TABS      metoprolol tartrate 50 MG tablet  Commonly known as:   LOPRESSOR   50 mg, Oral, 2 Times Daily      mirtazapine 7.5 MG tablet  Commonly known as:  REMERON   7.5 mg, Daily      sertraline 25 MG tablet  Commonly known as:  ZOLOFT   25 mg, Oral, Daily             Follow-up Appointments  Future Appointments   Date Time Provider Department Center   1/8/2020  3:00 PM [facility]   1/8/2020  3:20 PM Royal Palacios MD [facility]         Test Results Pending at Discharge   Order Current Status    Blood Culture - Blood, Blood, Arterial Line In process    Blood Culture - Blood, Blood, Central Line In process           Time:37 minutes          Electronically signed by Dasha Gautam APRN at 10/20/19 2863

## 2019-10-25 LAB
BACTERIA SPEC AEROBE CULT: NORMAL
BACTERIA SPEC AEROBE CULT: NORMAL